# Patient Record
Sex: FEMALE | Race: WHITE | Employment: OTHER | RURAL
[De-identification: names, ages, dates, MRNs, and addresses within clinical notes are randomized per-mention and may not be internally consistent; named-entity substitution may affect disease eponyms.]

---

## 2017-01-10 ENCOUNTER — TELEPHONE (OUTPATIENT)
Dept: INTERNAL MEDICINE CLINIC | Age: 62
End: 2017-01-10

## 2017-01-10 NOTE — TELEPHONE ENCOUNTER
Chief Complaint   Patient presents with    Appointment     PSYCHIATRY     Left message to voicemail to return call to schedule appointment. Message states that they will return call within 48 business hours.

## 2017-01-12 ENCOUNTER — TELEPHONE (OUTPATIENT)
Dept: INTERNAL MEDICINE CLINIC | Age: 62
End: 2017-01-12

## 2017-05-08 DIAGNOSIS — E78.00 HIGH CHOLESTEROL: ICD-10-CM

## 2017-05-08 DIAGNOSIS — E07.9 THYROID DISEASE: ICD-10-CM

## 2017-05-08 RX ORDER — LEVOTHYROXINE SODIUM 137 UG/1
137 TABLET ORAL
Qty: 30 TAB | Refills: 0 | Status: CANCELLED | OUTPATIENT
Start: 2017-05-08

## 2017-05-08 RX ORDER — ATORVASTATIN CALCIUM 10 MG/1
10 TABLET, FILM COATED ORAL DAILY
Qty: 90 TAB | Refills: 1 | Status: SHIPPED | OUTPATIENT
Start: 2017-05-08 | End: 2017-07-19 | Stop reason: SDUPTHER

## 2017-05-08 RX ORDER — INSULIN GLARGINE 100 [IU]/ML
INJECTION, SOLUTION SUBCUTANEOUS
Qty: 1 VIAL | Refills: 5 | Status: SHIPPED | OUTPATIENT
Start: 2017-05-08 | End: 2017-10-03 | Stop reason: SDUPTHER

## 2017-05-08 NOTE — TELEPHONE ENCOUNTER
Last office visit:  11/4/16  Last med refilled:  Lantus Ins:  11/4/16  1 vial X no refills                                Reg ins:  11/4/16  10ml X 4 refills  No changes noted  Future appt:  5/11/17  Willard Koyanagi

## 2017-05-09 ENCOUNTER — TELEPHONE (OUTPATIENT)
Dept: INTERNAL MEDICINE CLINIC | Age: 62
End: 2017-05-09

## 2017-05-09 DIAGNOSIS — E07.9 THYROID DISEASE: ICD-10-CM

## 2017-05-09 NOTE — TELEPHONE ENCOUNTER
Patient called in reference to all of her prescriptions being called to [address] except her syringes. Please call her at [phone].

## 2017-05-09 NOTE — TELEPHONE ENCOUNTER
Chief Complaint   Patient presents with    Medication Refill     SYNTHROID AND SYRINGES     Requested Prescriptions     Pending Prescriptions Disp Refills    levothyroxine (SYNTHROID) 137 mcg tablet       Sig: Take 137 mcg by mouth Daily (before breakfast).  Insulin Syringes, Disposable, 1 mL syrg 90 Syringe 1     Sig: by Does Not Apply route daily. Patient has been informed that an appointment is required for the refill of her thyroid medication.   Patient is scheduled to be seen on Thursday, May 11, 2017 at 11 am.

## 2017-05-12 ENCOUNTER — OFFICE VISIT (OUTPATIENT)
Dept: INTERNAL MEDICINE CLINIC | Age: 62
End: 2017-05-12

## 2017-05-12 VITALS
WEIGHT: 125 LBS | DIASTOLIC BLOOD PRESSURE: 80 MMHG | TEMPERATURE: 97.3 F | SYSTOLIC BLOOD PRESSURE: 139 MMHG | OXYGEN SATURATION: 98 % | HEART RATE: 72 BPM | HEIGHT: 69 IN | BODY MASS INDEX: 18.51 KG/M2 | RESPIRATION RATE: 18 BRPM

## 2017-05-12 DIAGNOSIS — E03.9 ACQUIRED HYPOTHYROIDISM: ICD-10-CM

## 2017-05-12 DIAGNOSIS — Z12.39 SCREENING FOR BREAST CANCER: ICD-10-CM

## 2017-05-12 DIAGNOSIS — R22.0 SCALP MASS: Primary | ICD-10-CM

## 2017-05-12 DIAGNOSIS — F32.A DEPRESSION, UNSPECIFIED DEPRESSION TYPE: ICD-10-CM

## 2017-05-12 RX ORDER — PREGABALIN 100 MG/1
100 CAPSULE ORAL 2 TIMES DAILY
Qty: 60 CAP | Refills: 5 | Status: SHIPPED | OUTPATIENT
Start: 2017-05-12 | End: 2017-11-21 | Stop reason: SDUPTHER

## 2017-05-12 RX ORDER — LEVOTHYROXINE SODIUM 125 UG/1
TABLET ORAL
Refills: 0 | COMMUNITY
Start: 2017-02-26 | End: 2017-07-19 | Stop reason: SDUPTHER

## 2017-05-12 RX ORDER — INSULIN HUMAN 100 [IU]/ML
INJECTION, SOLUTION PARENTERAL
Refills: 0 | COMMUNITY
Start: 2017-04-23 | End: 2017-05-12 | Stop reason: ALTCHOICE

## 2017-05-12 RX ORDER — ASPIRIN 81 MG/1
81 TABLET ORAL DAILY
COMMUNITY
Start: 2017-05-12

## 2017-05-12 RX ORDER — SERTRALINE HYDROCHLORIDE 100 MG/1
50 TABLET, FILM COATED ORAL DAILY
COMMUNITY
Start: 2017-05-12 | End: 2017-08-14 | Stop reason: SDUPTHER

## 2017-05-12 NOTE — MR AVS SNAPSHOT
Visit Information Date & Time Provider Department Dept. Phone Encounter #  
 5/12/2017 11:00 AM Yong Mejia NP [facility] [phone] [id number] Follow-up Instructions Return in about 3 months (around 8/12/2017) for Well woman exam. Upcoming Health Maintenance Date Due Hepatitis C Screening 1955 HEMOGLOBIN A1C Q6M 1955 LIPID PANEL Q1 1955 FOOT EXAM Q1 11/25/1963 MICROALBUMIN Q1 11/25/1963 Pneumococcal 19-64 Medium Risk (1 of 1 - PPSV23) 11/25/1972 DTaP/Tdap/Td series (1 - Tdap) 11/25/1974 PAP AKA CERVICAL CYTOLOGY 11/25/1974 BREAST CANCER SCRN MAMMOGRAM 11/25/2003 FOBT Q 1 YEAR AGE 50-75 11/25/2003 ZOSTER VACCINE AGE 60> 11/25/2013 INFLUENZA AGE 9 TO ADULT 8/1/2017 EYE EXAM RETINAL OR DILATED Q1 11/18/2017 Allergies as of 5/12/2017  Review Complete On: 5/12/2017 By: Yong Mejia NP No Known Allergies Current Immunizations  Never Reviewed No immunizations on file. Not reviewed this visit You Were Diagnosed With   
  
 Codes Comments Acquired hypothyroidism    -  Primary ICD-10-CM: E03.9 ICD-9-CM: 244.9 Uncontrolled type 2 diabetes mellitus with diabetic nephropathy, with long-term current use of insulin (HCC)     ICD-10-CM: E11.21, E11.65, Z79.4 ICD-9-CM: 250.42, 583.81, V58.67 Depression, unspecified depression type     ICD-10-CM: F32.9 ICD-9-CM: 685 Screening for breast cancer     ICD-10-CM: Z12.39 
ICD-9-CM: V76.10 Scalp mass     ICD-10-CM: R22.0 ICD-9-CM: 158. 2 Vitals BP Pulse Temp Resp Height(growth percentile) Weight(growth percentile) 139/80 (BP 1 Location: Right arm, BP Patient Position: Sitting) 72 97.3 °F (36.3 °C) (Oral) 18 5' 9\" (1.753 m) 125 lb (56.7 kg) SpO2 BMI OB Status Smoking Status 98% 18.46 kg/m2 Postmenopausal Current Every Day Smoker Vitals History BMI and BSA Data Body Mass Index Body Surface Area 18.46 kg/m 2 1.66 m 2 Preferred Pharmacy Pharmacy Name Phone [facility], [address] [facility] [phone] Your Updated Medication List  
  
   
This list is accurate as of: 5/12/17 11:53 AM.  Always use your most recent med list.  
  
  
  
  
 aspirin delayed-release 81 mg tablet Take 1 Tab by mouth daily. atorvastatin 10 mg tablet Commonly known as:  LIPITOR Take 1 Tab by mouth daily. busPIRone 7.5 mg tablet Commonly known as:  BUSPAR Take 7.5 mg by mouth two (2) times a day. insulin glargine 100 unit/mL injection Commonly known as:  LANTUS Take 30 units daily  
  
 insulin NPH/insulin regular 100 unit/mL (70-30) injection Commonly known as:  NOVOLIN 70/30, HUMULIN 70/30 Take 20 uints in the AM and 20 uints in the PM. Insulin Syringes (Disposable) 1 mL Syrg 1 Each by Does Not Apply route three (3) times daily. lisinopril 20 mg tablet Commonly known as:  Dominick Lofty Take 1 Tab by mouth daily. One Touch Delica 33 gauge Misc Generic drug:  lancets  
use as directed by prescriber four times a day with meals and at bedtime ONETOUCH ULTRA TEST strip Generic drug:  glucose blood VI test strips  
use as directed by prescriber four times a day with meals and at bedtime  
  
 pregabalin 100 mg capsule Commonly known as:  Ephraim Handsome Take 1 Cap by mouth two (2) times a day. Max Daily Amount: 200 mg.  
  
 sertraline 100 mg tablet Commonly known as:  ZOLOFT Take 0.5 Tabs by mouth daily. * SYNTHROID 137 mcg tablet Generic drug:  levothyroxine Take 137 mcg by mouth Daily (before breakfast). * levothyroxine 125 mcg tablet Commonly known as:  SYNTHROID * Notice: This list has 2 medication(s) that are the same as other medications prescribed for you. Read the directions carefully, and ask your doctor or other care provider to review them with you. Prescriptions Printed Refills  
 pregabalin (LYRICA) 100 mg capsule 5 Sig: Take 1 Cap by mouth two (2) times a day. Max Daily Amount: 200 mg. Class: Print Route: Oral  
  
Prescriptions Sent to Pharmacy Refills Insulin Syringes, Disposable, 1 mL syrg 5 Si Each by Does Not Apply route three (3) times daily. Class: Normal  
 Pharmacy: [facility] [address] [place] [address] #: [phone] Route: Does Not Apply We Performed the Following MICROALBUMIN, UR, RAND W/ MICROALBUMIN/CREA RATIO [id number] CPT(R)] Follow-up Instructions Return in about 3 months (around 2017) for Well woman exam. To-Do List   
 2017 Lab:  HEMOGLOBIN A1C WITH EAG   
  
 2017 Lab:  LIPID PANEL   
  
 2017 Imaging:  [facility] MAMMO BI SCREENING INCL CAD   
  
 2017 Lab:  METABOLIC PANEL, COMPREHENSIVE   
  
 2017 Lab:  TSH 3RD GENERATION   
  
 2017 Imaging:  XR SKULL MIN 4 V(COMP) Introducing [facility] & HEALTH SERVICES! Liz Fredy introduces [employer] patient portal. Now you can access parts of your medical record, email your doctor's office, and request medication refills online. 1. In your internet browser, go to [email or web address] 2. Click on the First Time User? Click Here link in the Sign In box. You will see the New Member Sign Up page. 3. Enter your O[employer]od Access Code exactly as it appears below. You will not need to use this code after youve completed the sign-up process. If you do not sign up before the expiration date, you must request a new code. · Ogorod Access Code: [email or web address] Expires: 8/10/2017 10:49 AM 
 
4. Enter the last four digits of your Social Security Number (xxxx) and Date of Birth (mm/dd/yyyy) as indicated and click Submit. You will be taken to the next sign-up page. 5. Create a Tu Otro Super ID. This will be your [employer] login ID and cannot be changed, so think of one that is secure and easy to remember. 6. Create a [employer] Super password. You can change your password at any time. 7. Enter your Password Reset Question and Answer. This can be used at a later time if you forget your password. 8. Enter your e-mail address. You will receive e-mail notification when new information is available in [address] 9. Click Sign Up. You can now view and download portions of your medical record. 10. Click the Download Summary menu link to download a portable copy of your medical information. If you have questions, please visit the Frequently Asked Questions section of the [employer] website. Remember, [employer] is NOT to be used for urgent needs. For medical emergencies, dial 911. Now available from your iPhone and Android! Please provide this summary of care documentation to your next provider. Your primary care clinician is listed as Mason Gill. If you have any questions after today's visit, please call [phone].

## 2017-05-12 NOTE — PROGRESS NOTES
HISTORY OF PRESENT ILLNESS  Naheed Salas is a 59 y.o. female. HPI  Chief Complaint   Patient presents with    Diabetes     MEDICATION REFILL    Foot Swelling     BILATERAL SINCE LAST WEEK    Mass     TOP OF HEAD X 3 MONTHS     States foot swelling is better. States it goes down at night. Denies cough or difficulty in breathing. Request refill Dm insulin needles. States 's  Patient in agreement to get labs. 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 Denies pain. Denies trauma. Review of Systems   Constitutional: Negative. Negative for chills and fever. HENT: Negative. Negative for congestion, ear pain and sore throat. Eyes: Negative. Respiratory: Negative. Negative for cough. Cardiovascular: Negative. Negative for chest pain and claudication. Gastrointestinal: Negative. Negative for abdominal pain, constipation, diarrhea, nausea and vomiting. Genitourinary: Negative. Skin: Negative. Scalp mass   Neurological: Negative for headaches. Physical Exam   Constitutional: She is oriented to person, place, and time. She appears well-developed and well-nourished. No distress. HENT:   Head: Normocephalic and atraumatic. Eyes: Conjunctivae are normal.   Neck: Normal range of motion. Cardiovascular: Normal rate, regular rhythm, normal heart sounds and intact distal pulses. Exam reveals no gallop and no friction rub. No murmur heard. Pulmonary/Chest: Effort normal and breath sounds normal.   Musculoskeletal: Normal range of motion. She exhibits deformity. She exhibits no tenderness. Positive for ridges on scalp. No pain with palpation     Neurological: She is alert and oriented to person, place, and time. Skin: Skin is warm and dry. She is not diaphoretic. Nursing note and vitals reviewed. Plan of care and AVS reviewed with patient who verbalized understanding.     ASSESSMENT and PLAN    ICD-10-CM ICD-9-CM    1. Scalp mass R22.0 782. 2 XR SKULL MIN 4 V(COMP)   2. Uncontrolled type 2 diabetes mellitus with diabetic nephropathy, with long-term current use of insulin (HCC) E11.21 250.42 pregabalin (LYRICA) 100 mg capsule    A82.50 462.30 METABOLIC PANEL, COMPREHENSIVE    Z79.4 V58.67 HEMOGLOBIN A1C WITH EAG      LIPID PANEL      aspirin delayed-release 81 mg tablet      Insulin Syringes, Disposable, 1 mL syrg      MICROALBUMIN, UR, RAND W/ MICROALBUMIN/CREA RATIO   3. Depression, unspecified depression type F32.9 311 sertraline (ZOLOFT) 100 mg tablet   4. Acquired hypothyroidism E03.9 244.9 TSH 3RD GENERATION   5. Screening for breast cancer Z12.39 V76.10 [facility] MAMMO BI SCREENING INCL CAD   Patient to get labs and F/U well woman exam in august.  Will notify patient of results of x ray.

## 2017-05-12 NOTE — PROGRESS NOTES
Chief Complaint   Patient presents with    Diabetes     MEDICATION REFILL    Foot Swelling     BILATERAL SINCE LAST WEEK    Mass     TOP OF HEAD X 3 MONTHS     1. Have you been to the ER, urgent care clinic since your last visit? Hospitalized since your last visit? No    2. Have you seen or consulted any other health care providers outside of the [address] [place] [address] since your last visit? Include any pap smears or colon screening.  No     Health Maintenance Due   Topic Date Due    Hepatitis C Screening  1955    HEMOGLOBIN A1C Q6M  1953    LIPID PANEL Q1  1953    FOOT EXAM Q1  11/25/1963    MICROALBUMIN Q1  11/25/1963    Pneumococcal 19-64 Medium Risk (1 of 1 - PPSV23) 11/25/1972    DTaP/Tdap/Td series (1 - Tdap) 11/25/1974    PAP AKA CERVICAL CYTOLOGY  11/25/1974    BREAST CANCER SCRN MAMMOGRAM  11/25/2003    FOBT Q 1 YEAR AGE 50-75  11/25/2003    ZOSTER VACCINE AGE 60>  11/25/2013

## 2017-07-10 ENCOUNTER — CLINICAL SUPPORT (OUTPATIENT)
Dept: INTERNAL MEDICINE CLINIC | Age: 62
End: 2017-07-10

## 2017-07-10 DIAGNOSIS — F32.A DEPRESSION, UNSPECIFIED DEPRESSION TYPE: ICD-10-CM

## 2017-07-10 DIAGNOSIS — E07.9 THYROID DISEASE: ICD-10-CM

## 2017-07-10 NOTE — PROGRESS NOTES
Chief Complaint   Patient presents with    Labs Only     TSH, LIPID, HGA1C, AND CMP     The patient presents with lab draw only. Blood drawn from right antecubital fossa and no negative reaction noted at site of blood draw.

## 2017-07-11 LAB
ALBUMIN 24H UR-MRATE: NORMAL MG/DAY
ALBUMIN SERPL-MCNC: 5 G/DL (ref 3.6–4.8)
ALBUMIN/GLOB SERPL: 2 {RATIO} (ref 1.2–2.2)
ALP SERPL-CCNC: 92 IU/L (ref 39–117)
ALT SERPL-CCNC: 42 IU/L (ref 0–32)
AST SERPL-CCNC: 70 IU/L (ref 0–40)
BILIRUB SERPL-MCNC: 0.3 MG/DL (ref 0–1.2)
BUN SERPL-MCNC: 11 MG/DL (ref 8–27)
BUN/CREAT SERPL: 10 (ref 12–28)
CALCIUM SERPL-MCNC: 9.7 MG/DL (ref 8.7–10.3)
CHLORIDE SERPL-SCNC: 89 MMOL/L (ref 96–106)
CHOLEST SERPL-MCNC: 292 MG/DL (ref 100–199)
CO2 SERPL-SCNC: 29 MMOL/L (ref 18–29)
CREAT SERPL-MCNC: 1.15 MG/DL (ref 0.57–1)
EST. AVERAGE GLUCOSE BLD GHB EST-MCNC: 278 MG/DL
GLOBULIN SER CALC-MCNC: 2.5 G/DL (ref 1.5–4.5)
GLUCOSE SERPL-MCNC: 41 MG/DL (ref 65–99)
HBA1C MFR BLD: 11.3 % (ref 4.8–5.6)
HDLC SERPL-MCNC: 115 MG/DL
INTERPRETATION, 910389: NORMAL
INTERPRETATION: NORMAL
LDLC SERPL CALC-MCNC: 150 MG/DL (ref 0–99)
Lab: NORMAL
MICROALBUMIN UR-MCNC: 47.6 UG/ML
PDF IMAGE, 910387: NORMAL
POTASSIUM SERPL-SCNC: 4.4 MMOL/L (ref 3.5–5.2)
PROT SERPL-MCNC: 7.5 G/DL (ref 6–8.5)
SODIUM SERPL-SCNC: 137 MMOL/L (ref 134–144)
TRIGL SERPL-MCNC: 134 MG/DL (ref 0–149)
TSH SERPL DL<=0.005 MIU/L-ACNC: 118.9 UIU/ML (ref 0.45–4.5)
VLDLC SERPL CALC-MCNC: 27 MG/DL (ref 5–40)

## 2017-07-17 ENCOUNTER — TELEPHONE (OUTPATIENT)
Dept: INTERNAL MEDICINE CLINIC | Age: 62
End: 2017-07-17

## 2017-07-17 NOTE — TELEPHONE ENCOUNTER
Chief Complaint   Patient presents with    Appointment     LABS     Appointment scheduled per Sarahy Zamora DNP to discuss her labs.

## 2017-07-19 ENCOUNTER — OFFICE VISIT (OUTPATIENT)
Dept: INTERNAL MEDICINE CLINIC | Age: 62
End: 2017-07-19

## 2017-07-19 VITALS
HEART RATE: 69 BPM | SYSTOLIC BLOOD PRESSURE: 132 MMHG | DIASTOLIC BLOOD PRESSURE: 76 MMHG | RESPIRATION RATE: 18 BRPM | WEIGHT: 128.6 LBS | TEMPERATURE: 98.8 F | HEIGHT: 69 IN | OXYGEN SATURATION: 91 % | BODY MASS INDEX: 19.05 KG/M2

## 2017-07-19 DIAGNOSIS — E78.00 HIGH CHOLESTEROL: ICD-10-CM

## 2017-07-19 DIAGNOSIS — E07.9 THYROID DISEASE: ICD-10-CM

## 2017-07-19 LAB
BILIRUB UR QL STRIP: NEGATIVE
GLUCOSE UR-MCNC: NEGATIVE MG/DL
KETONES P FAST UR STRIP-MCNC: NEGATIVE MG/DL
PH UR STRIP: 7 [PH] (ref 4.6–8)
PROT UR QL STRIP: POSITIVE MG/DL
SP GR UR STRIP: 1.02 (ref 1–1.03)
UA UROBILINOGEN AMB POC: NORMAL (ref 0.2–1)
URINALYSIS CLARITY POC: CLEAR
URINALYSIS COLOR POC: YELLOW
URINE BLOOD POC: NEGATIVE
URINE LEUKOCYTES POC: NORMAL
URINE NITRITES POC: NEGATIVE

## 2017-07-19 RX ORDER — PEN NEEDLE, DIABETIC 29 G X1/2"
NEEDLE, DISPOSABLE MISCELLANEOUS
COMMUNITY
Start: 2017-05-12 | End: 2019-01-08 | Stop reason: SDUPTHER

## 2017-07-19 RX ORDER — BUPRENORPHINE HYDROCHLORIDE, NALOXONE HYDROCHLORIDE 8; 2 MG/1; MG/1
FILM, SOLUBLE BUCCAL; SUBLINGUAL
Refills: 0 | COMMUNITY
Start: 2017-07-12 | End: 2018-04-27 | Stop reason: ALTCHOICE

## 2017-07-19 RX ORDER — ATORVASTATIN CALCIUM 20 MG/1
20 TABLET, FILM COATED ORAL DAILY
Qty: 90 TAB | Refills: 1 | Status: SHIPPED | OUTPATIENT
Start: 2017-07-19 | End: 2018-06-12 | Stop reason: SDUPTHER

## 2017-07-19 RX ORDER — LEVOTHYROXINE SODIUM 125 UG/1
125 TABLET ORAL DAILY
Qty: 30 TAB | Refills: 5 | Status: SHIPPED | OUTPATIENT
Start: 2017-07-19 | End: 2017-09-15 | Stop reason: SDUPTHER

## 2017-07-19 RX ORDER — INSULIN HUMAN 100 [IU]/ML
INJECTION, SOLUTION PARENTERAL
Refills: 0 | COMMUNITY
Start: 2017-04-23 | End: 2017-07-25 | Stop reason: ALTCHOICE

## 2017-07-19 RX ORDER — FUROSEMIDE 20 MG/1
20 TABLET ORAL DAILY
Qty: 10 TAB | Refills: 0 | Status: SHIPPED | OUTPATIENT
Start: 2017-07-19 | End: 2017-07-29

## 2017-07-19 NOTE — PROGRESS NOTES
Chief Complaint   Patient presents with    Ankle swelling    Labs     FOLLOW UP     1. Have you been to the ER, urgent care clinic since your last visit? Hospitalized since your last visit? No    2. Have you seen or consulted any other health care providers outside of the [address] [place] [address] since your last visit? Include any pap smears or colon screening. No     There are no preventive care reminders to display for this patient.

## 2017-07-19 NOTE — MR AVS SNAPSHOT
Visit Information Date & Time Provider Department Dept. Phone Encounter #  
 7/19/2017 11:15 AM Karen Shen NP [facility] [phone] Your Appointments 8/14/2017 11:00 AM  
ROUTINE CARE with HUY Brito Lien (LULU SCHEDULING) Appt Note: 3 mo f/u on dm $0pb $0cp [facility]5/12/17  
 [address] [place] [address] Road. [address] [facility] [address]  
[phone]  
  
   
 [address] [place] [address] [address] Upcoming Health Maintenance Date Due Hepatitis C Screening 8/30/2017* FOOT EXAM Q1 8/30/2017* BREAST CANCER SCRN MAMMOGRAM 8/30/2017* PAP AKA CERVICAL CYTOLOGY 8/30/2017* FOBT Q 1 YEAR AGE 50-75 8/30/2017* INFLUENZA AGE 9 TO ADULT 8/1/2017 EYE EXAM RETINAL OR DILATED Q1 11/18/2017 HEMOGLOBIN A1C Q6M 1/10/2018 MICROALBUMIN Q1 7/10/2018 LIPID PANEL Q1 7/10/2018 DTaP/Tdap/Td series (2 - Td) 5/12/2027 *Topic was postponed. The date shown is not the original due date. Allergies as of 7/19/2017  Review Complete On: 7/19/2017 By: sAif Butler LPN No Known Allergies Current Immunizations  Never Reviewed Name Date Influenza Vaccine 10/28/2016 12:00 AM, 11/12/2012 12:00 AM, 10/18/2012 12:00 AM, 11/17/2009 12:00 AM  
 Pneumococcal Polysaccharide (PPSV-23) 10/18/2012 12:00 AM  
 Zoster Vaccine, Live 11/12/2012 12:00 AM  
  
 Not reviewed this visit You Were Diagnosed With   
  
 Codes Comments Uncontrolled type 2 diabetes mellitus with diabetic nephropathy, with long-term current use of insulin ([facility] [address]    -  Primary ICD-10-CM: E11.21, E11.65, Z79.4 ICD-9-CM: 250.42, 583.81, V58.67 High cholesterol     ICD-10-CM: E78.00 ICD-9-CM: 272.0 Thyroid disease     ICD-10-CM: E07.9 ICD-9-CM: 246. 9 Vitals BP Pulse Temp Resp Height(growth percentile) Weight(growth percentile) 132/76 (BP 1 Location: Left arm, BP Patient Position: Sitting) 69 98.8 °F (37.1 °C) (Oral) 18 5' 9\" (1.753 m) 128 lb 9.6 oz (58.3 kg) SpO2 BMI OB Status Smoking Status 91% 18.99 kg/m2 Postmenopausal Current Every Day Smoker Vitals History BMI and BSA Data Body Mass Index Body Surface Area  
 18.99 kg/m 2 1.68 m 2 Preferred Pharmacy Pharmacy Name Phone [facility] [phone] Your Updated Medication List  
  
   
This list is accurate as of: 7/19/17 11:18 AM.  Always use your most recent med list.  
  
  
  
  
 aspirin delayed-release 81 mg tablet Take 1 Tab by mouth daily. atorvastatin 20 mg tablet Commonly known as:  LIPITOR Take 1 Tab by mouth daily. BD INSULIN SYRINGE ULTRA-FINE 1 mL 31 gauge x 5/16 Syrg Generic drug:  Insulin Syringe-Needle U-100  
  
 busPIRone 7.5 mg tablet Commonly known as:  BUSPAR Take 7.5 mg by mouth two (2) times a day. furosemide 20 mg tablet Commonly known as:  LASIX Take 1 Tab by mouth daily for 10 days. HumuLIN R U-100 100 unit/mL injection Generic drug:  insulin regular  
  
 insulin glargine 100 unit/mL injection Commonly known as:  LANTUS Take 30 units daily  
  
 insulin NPH/insulin regular 100 unit/mL (70-30) injection Commonly known as:  NOVOLIN 70/30, HUMULIN 70/30 Take 20 uints in the AM and 20 uints in the PM. levothyroxine 125 mcg tablet Commonly known as:  SYNTHROID Take 1 Tab by mouth daily. lisinopril 20 mg tablet Commonly known as:  Lynne Bills Take 1 Tab by mouth daily. One Touch Delica 33 gauge Misc Generic drug:  lancets  
use as directed by prescriber four times a day with meals and at bedtime ONETOUCH ULTRA TEST strip Generic drug:  glucose blood VI test strips  
use as directed by prescriber four times a day with meals and at bedtime pregabalin 100 mg capsule Commonly known as:  Ash Citizen Take 1 Cap by mouth two (2) times a day. Max Daily Amount: 200 mg.  
  
 sertraline 100 mg tablet Commonly known as:  ZOLOFT Take 0.5 Tabs by mouth daily. SUBOXONE 8-2 mg Film sublingaul film Generic drug:  buprenorphine-naloxone  
dissolve 2 and 1/2 (TWO AND A HALF) FILMS under the tongue daily Prescriptions Sent to Pharmacy Refills  
 levothyroxine (SYNTHROID) 125 mcg tablet 5 Sig: Take 1 Tab by mouth daily. Class: Normal  
 Pharmacy: [facility] [address] [facility] Ph #: [phone] Route: Oral  
 atorvastatin (LIPITOR) 20 mg tablet 1 Sig: Take 1 Tab by mouth daily. Class: Normal  
 Pharmacy: [facility] [address] [facility] Ph #: [phone] Route: Oral  
 furosemide (LASIX) 20 mg tablet 0 Sig: Take 1 Tab by mouth daily for 10 days. Class: Normal  
 Pharmacy: [facility] [address] [facility] Ph #: [phone] Route: Oral  
  
We Performed the Following AMB POC URINALYSIS DIP STICK MANUAL W/O MICRO [05121 CPT(R)] MICROALBUMIN, UR, RAND W/ MICROALBUMIN/CREA RATIO [id number] CPT(R)] To-Do List   
 07/19/2017 Lab:  METABOLIC PANEL, COMPREHENSIVE   
  
 07/19/2017 Lab:  TSH 3RD GENERATION Introducing [facility] & HEALTH SERVICES! [facility] introduces [employer]ib patient portal. Now you can access parts of your medical record, email your doctor's office, and request medication refills online. 1. In your internet browser, go to [email or web address] 2. Click on the First Time User? Click Here link in the Sign In box. You will see the New Member Sign Up page. 3. Enter your Doctolib Access Code exactly as it appears below. You will not need to use this code after youve completed the sign-up process.  If you do not sign up before the expiration date, you must request a new code. · Ploonge Access Code: [email or web address] Expires: 8/10/2017 10:49 AM 
 
4. Enter the last four digits of your Social Security Number (xxxx) and Date of Birth (mm/dd/yyyy) as indicated and click Submit. You will be taken to the next sign-up page. 5. Create a Ploonge ID. This will be your Ploonge login ID and cannot be changed, so think of one that is secure and easy to remember. 6. Create a Ploonge password. You can change your password at any time. 7. Enter your Password Reset Question and Answer. This can be used at a later time if you forget your password. 8. Enter your e-mail address. You will receive e-mail notification when new information is available in [address] 9. Click Sign Up. You can now view and download portions of your medical record. 10. Click the Download Summary menu link to download a portable copy of your medical information. If you have questions, please visit the Frequently Asked Questions section of the Ploonge website. Remember, Ploonge is NOT to be used for urgent needs. For medical emergencies, dial 911. Now available from your iPhone and Android! Please provide this summary of care documentation to your next provider. Your primary care clinician is listed as Karen Shen. If you have any questions after today's visit, please call [phone].

## 2017-07-20 LAB
ALBUMIN/CREAT UR: 186.3 MG/G CREAT (ref 0–30)
CREAT UR-MCNC: 54.9 MG/DL
MICROALBUMIN UR-MCNC: 102.3 UG/ML

## 2017-07-25 NOTE — PROGRESS NOTES
HISTORY OF PRESENT ILLNESS  Clemetn Tipton is a 59 y.o. female. HPI  Chief Complaint   Patient presents with    Ankle swelling    Labs     FOLLOW UP     Displays most recent values of common labs: H&H, WBC, Platelets, ALT, AST, BUN, Creat, Na, K, TSH, HgbA1c, Lipids, INR and/or PSA. For additional labs, please use Results Review or Flowsheets. Lab Results   Component Value Date/Time    ALT (SGPT) 42 07/10/2017 09:07 AM    AST (SGOT) 70 07/10/2017 09:07 AM    Creatinine 1.15 07/10/2017 09:07 AM    BUN 11 07/10/2017 09:07 AM    Sodium 137 07/10/2017 09:07 AM    Potassium 4.4 07/10/2017 09:07 AM       Lab Results   Component Value Date/Time    Cholesterol, total 292 07/10/2017 09:07 AM    HDL Cholesterol 115 07/10/2017 09:07 AM    LDL, calculated 150 07/10/2017 09:07 AM    Triglyceride 134 07/10/2017 09:07 AM    .900 07/10/2017 09:07 AM    Hemoglobin A1c 11.3 07/10/2017 09:07 AM     Diabetes is not being controlled. Patient in agreement to follow-up labs. Patient admits to not taking meications related to being busy with taking care of family. Medication list revised. Review of Systems   Constitutional: Negative. Negative for chills and fever. HENT: Negative. Negative for congestion, ear pain and sore throat. Eyes: Negative. Respiratory: Negative for cough and shortness of breath. Cardiovascular: Positive for leg swelling. Negative for chest pain. Gastrointestinal: Negative. Negative for nausea and vomiting. Genitourinary: Negative. Negative for dysuria and flank pain. Musculoskeletal: Negative. Negative for myalgias. Skin: Negative. Neurological: Negative for headaches. Physical Exam   Constitutional: She is oriented to person, place, and time. She appears well-developed and well-nourished. No distress. HENT:   Head: Normocephalic and atraumatic. Eyes: Conjunctivae are normal.   Neck: Normal range of motion. Neck supple.    Cardiovascular: Normal rate, regular rhythm, normal heart sounds and intact distal pulses. Exam reveals no gallop and no friction rub. No murmur heard. Pulmonary/Chest: Effort normal and breath sounds normal.   Abdominal: Soft. Bowel sounds are normal.   Musculoskeletal: Normal range of motion. Neurological: She is alert and oriented to person, place, and time. Skin: Skin is warm and dry. She is not diaphoretic. Nursing note and vitals reviewed. Plan of care and AVS reviewed with patient who verbalized understanding. ASSESSMENT and PLAN    ICD-10-CM ICD-9-CM    1. Uncontrolled type 2 diabetes mellitus with diabetic nephropathy, with long-term current use of insulin (HCC) E11.21 250.42 AMB POC URINALYSIS DIP STICK MANUAL W/O MICRO    E11.65 583.81 MICROALBUMIN, UR, RAND W/ MICROALBUMIN/CREA RATIO    O64.2 N76.93 METABOLIC PANEL, COMPREHENSIVE   2. High cholesterol E78.00 272.0 atorvastatin (LIPITOR) 20 mg tablet   3. Thyroid disease E07.9 246.9 TSH 3RD GENERATION   Will notify of new lab work results. Encouraged patient to take medications as prescribed. F/U 3 months.

## 2017-08-03 ENCOUNTER — CLINICAL SUPPORT (OUTPATIENT)
Dept: INTERNAL MEDICINE CLINIC | Age: 62
End: 2017-08-03

## 2017-08-03 ENCOUNTER — OFFICE VISIT (OUTPATIENT)
Dept: INTERNAL MEDICINE CLINIC | Age: 62
End: 2017-08-03

## 2017-08-03 VITALS
HEART RATE: 98 BPM | OXYGEN SATURATION: 81 % | TEMPERATURE: 99.8 F | SYSTOLIC BLOOD PRESSURE: 139 MMHG | WEIGHT: 121.2 LBS | RESPIRATION RATE: 18 BRPM | HEIGHT: 69 IN | DIASTOLIC BLOOD PRESSURE: 67 MMHG | BODY MASS INDEX: 17.95 KG/M2

## 2017-08-03 VITALS
WEIGHT: 121.2 LBS | BODY MASS INDEX: 17.95 KG/M2 | HEART RATE: 98 BPM | SYSTOLIC BLOOD PRESSURE: 139 MMHG | HEIGHT: 69 IN | TEMPERATURE: 99.8 F | RESPIRATION RATE: 18 BRPM | DIASTOLIC BLOOD PRESSURE: 67 MMHG

## 2017-08-03 DIAGNOSIS — E07.9 THYROID DISEASE: ICD-10-CM

## 2017-08-03 DIAGNOSIS — R06.89 DECREASED BREATH SOUNDS: ICD-10-CM

## 2017-08-03 DIAGNOSIS — R79.81 LOW O2 SATURATION: Primary | ICD-10-CM

## 2017-08-03 DIAGNOSIS — R53.83 LETHARGY: ICD-10-CM

## 2017-08-03 DIAGNOSIS — R50.9 FEVER, UNSPECIFIED FEVER CAUSE: ICD-10-CM

## 2017-08-03 LAB — GLUCOSE POC: 78 MG/DL

## 2017-08-03 RX ORDER — ALBUTEROL SULFATE 0.83 MG/ML
2.5 SOLUTION RESPIRATORY (INHALATION) ONCE
Qty: 1 EACH | Refills: 0
Start: 2017-08-03 | End: 2017-08-03

## 2017-08-03 NOTE — PROGRESS NOTES
HISTORY OF PRESENT ILLNESS  Jackelyn Joshua is a 59 y.o. female. HPI  Patient in with daughter. Chief Complaint   Patient presents with    Fever    O2/Oxygen     low saturation     Came in for labs and VS indicated low O2 Saturation and lethargy and patient stated did not feel well. Patient states has been taking prescribed medications off and own. States there is no reason why she cannot take her medications as prescribed. Patient is talkative and makes fleeting eye contact with keeping her eyes closed most of the time. States is tired, and don't feel well. Past Medical History:   Diagnosis Date    Depression     x 1 year    Diabetes ([facility]     > 15 years    DKA (diabetic ketoacidoses) ([facility]     10/29/ hospitalization    Generalized pain     High cholesterol     Thyroid disease     Thyroid removed. Social History     Social History    Marital status:      Spouse name: N/A    Number of children: N/A    Years of education: N/A     Occupational History    Not on file. Social History Main Topics    Smoking status: Current Every Day Smoker     Packs/day: 2.00    Smokeless tobacco: Never Used    Alcohol use No    Drug use: No    Sexual activity: No     Other Topics Concern     Service No    Blood Transfusions Yes    Caffeine Concern Yes     Not sure drinks a lot foof suragr    Sleep Concern Yes    Stress Concern Yes    Exercise No    Seat Belt Yes    Self-Exams Yes     Social History Narrative    Smokes and has quit1 yrar ago. Unemployed. ,  3 children        Review of Systems   Constitutional: Positive for chills, fever and malaise/fatigue. States just don't  Feel well. HENT: Negative. Negative for congestion and sore throat. Eyes: Negative. Respiratory: Positive for cough. Negative for shortness of breath and wheezing. Cardiovascular: Negative. Negative for chest pain and leg swelling. Gastrointestinal: Negative.   Negative for abdominal pain, diarrhea, nausea and vomiting. Genitourinary: Negative. Musculoskeletal: Negative. Skin: Negative. Neurological: Negative. Negative for dizziness. Physical Exam   Constitutional: She is oriented to person, place, and time. She appears well-developed and well-nourished. No distress. HENT:   Head: Normocephalic and atraumatic. Eyes: Conjunctivae are normal.   Cardiovascular: Normal rate, regular rhythm, normal heart sounds and intact distal pulses. Exam reveals no gallop and no friction rub. No murmur heard. Pulmonary/Chest: Effort normal. No respiratory distress. Decrease breath sounds   O2 sats remain low after breathing treatment. 81%  Has intermittent cough   Musculoskeletal: Normal range of motion. Neurological: She is alert and oriented to person, place, and time. Appears lethargic   Skin: She is not diaphoretic. Nursing note and vitals reviewed. Plan of care and AVS reviewed with patient who verbalized understanding. ASSESSMENT and PLAN    ICD-10-CM ICD-9-CM    1. Low O2 saturation R79.81 790.91 XR CHEST PA LAT      albuterol (PROVENTIL VENTOLIN) 2.5 mg /3 mL (0.083 %) nebulizer solution      ALBUTEROL, INHAL. SOL., FDA-APPROVED FINAL, NON-COMPOUND UNIT DOSE, 1 MG      INHAL RX, AIRWAY OBST/DX SPUTUM INDUCT   2. Decreased breath sounds R06.89 786.7 XR CHEST PA LAT      albuterol (PROVENTIL VENTOLIN) 2.5 mg /3 mL (0.083 %) nebulizer solution      ALBUTEROL, INHAL. SOL., FDA-APPROVED FINAL, NON-COMPOUND UNIT DOSE, 1 MG      INHAL RX, AIRWAY OBST/DX SPUTUM INDUCT   3. Fever, unspecified fever cause R50.9 780.60 XR CHEST PA LAT   4. Uncontrolled type 2 diabetes mellitus with diabetic nephropathy, with long-term current use of insulin ([facility]) E11.21 250.42 AMB POC GLUCOSE BLOOD, BY GLUCOSE MONITORING DEVICE    E11.65 583.81     Z79.4 V58.67    5. Lethargy R53.83 780.79 AMB POC GLUCOSE BLOOD, BY GLUCOSE MONITORING DEVICE   Suspect pneumonia.   Will send patient to hospital ER for evaluation  Patient refused EMS and elected to drive self to [facility] after taking to spouse. Daughter will accompanied  Patient to ER.

## 2017-08-04 LAB
ALBUMIN SERPL-MCNC: 4.5 G/DL (ref 3.6–4.8)
ALBUMIN/GLOB SERPL: 1.6 {RATIO} (ref 1.2–2.2)
ALP SERPL-CCNC: 219 IU/L (ref 39–117)
ALT SERPL-CCNC: 64 IU/L (ref 0–32)
AST SERPL-CCNC: 73 IU/L (ref 0–40)
BILIRUB SERPL-MCNC: 0.5 MG/DL (ref 0–1.2)
BUN SERPL-MCNC: 9 MG/DL (ref 8–27)
BUN/CREAT SERPL: 13 (ref 12–28)
CALCIUM SERPL-MCNC: 9.7 MG/DL (ref 8.7–10.3)
CHLORIDE SERPL-SCNC: 91 MMOL/L (ref 96–106)
CO2 SERPL-SCNC: 27 MMOL/L (ref 18–29)
CREAT SERPL-MCNC: 0.71 MG/DL (ref 0.57–1)
GLOBULIN SER CALC-MCNC: 2.8 G/DL (ref 1.5–4.5)
GLUCOSE SERPL-MCNC: 51 MG/DL (ref 65–99)
HBA1C MFR BLD HPLC: 9.5 %
POTASSIUM SERPL-SCNC: 3.8 MMOL/L (ref 3.5–5.2)
PROT SERPL-MCNC: 7.3 G/DL (ref 6–8.5)
SODIUM SERPL-SCNC: 139 MMOL/L (ref 134–144)
TSH SERPL DL<=0.005 MIU/L-ACNC: 33.91 UIU/ML (ref 0.45–4.5)

## 2017-08-08 ENCOUNTER — DOCUMENTATION ONLY (OUTPATIENT)
Dept: INTERNAL MEDICINE CLINIC | Age: 62
End: 2017-08-08

## 2017-08-08 ENCOUNTER — HOME HEALTH ADMISSION (OUTPATIENT)
Dept: HOME HEALTH SERVICES | Facility: HOME HEALTH | Age: 62
End: 2017-08-08

## 2017-08-08 ENCOUNTER — TELEPHONE (OUTPATIENT)
Dept: INTERNAL MEDICINE CLINIC | Age: 62
End: 2017-08-08

## 2017-08-08 NOTE — TELEPHONE ENCOUNTER
Patient called in reference to needing more oxygen ordered. She was sent home from the hospital today. They only gave her 2 tanks. Please call her at [phone].

## 2017-08-08 NOTE — PROGRESS NOTES
Pt called stated that she just wanted to inform dr Endy Ruffin that she has gotten the oxygen tank that she was waiting on

## 2017-08-08 NOTE — TELEPHONE ENCOUNTER
Courtney Grijalva, from [facility], in reference to them starting home health on patient tomorrow. If you have any questions she can be reached at [phone].

## 2017-08-08 NOTE — TELEPHONE ENCOUNTER
Chief Complaint   Patient presents with    O2/Oxygen     Patient states that she does not need any oxygen ordered and she has a scheduled upcoming appointment for the 14th of this month. She was admitted on Thursday, August 3 and was released this afternoon.

## 2017-08-09 ENCOUNTER — HOME CARE VISIT (OUTPATIENT)
Dept: SCHEDULING | Facility: HOME HEALTH | Age: 62
End: 2017-08-09

## 2017-08-09 NOTE — TELEPHONE ENCOUNTER
Chief Complaint   Patient presents with   [address] with Natan Stanley and request discharge summary from [facility] and it will be faxed per Natan Stanley.

## 2017-08-11 ENCOUNTER — TELEPHONE (OUTPATIENT)
Dept: INTERNAL MEDICINE CLINIC | Age: 62
End: 2017-08-11

## 2017-08-11 NOTE — TELEPHONE ENCOUNTER
Claudell Ferraris, from [facility], called in reference to going to patients home Wednesday and she refused services. If you have any questions she can be reached at [phone].

## 2017-08-14 ENCOUNTER — OFFICE VISIT (OUTPATIENT)
Dept: INTERNAL MEDICINE CLINIC | Age: 62
End: 2017-08-14

## 2017-08-14 VITALS
TEMPERATURE: 98.3 F | SYSTOLIC BLOOD PRESSURE: 124 MMHG | HEIGHT: 69 IN | RESPIRATION RATE: 17 BRPM | HEART RATE: 85 BPM | DIASTOLIC BLOOD PRESSURE: 84 MMHG | OXYGEN SATURATION: 100 % | WEIGHT: 130.8 LBS | BODY MASS INDEX: 19.37 KG/M2

## 2017-08-14 DIAGNOSIS — Z12.11 SCREENING FOR COLON CANCER: ICD-10-CM

## 2017-08-14 DIAGNOSIS — J44.9 CHRONIC OBSTRUCTIVE PULMONARY DISEASE, UNSPECIFIED COPD TYPE (HCC): Primary | ICD-10-CM

## 2017-08-14 DIAGNOSIS — E11.9 CONTROLLED TYPE 2 DIABETES MELLITUS WITHOUT COMPLICATION, WITHOUT LONG-TERM CURRENT USE OF INSULIN (HCC): ICD-10-CM

## 2017-08-14 DIAGNOSIS — F32.A DEPRESSION, UNSPECIFIED DEPRESSION TYPE: ICD-10-CM

## 2017-08-14 PROBLEM — J44.0 COPD WITH ACUTE BRONCHITIS (HCC): Status: ACTIVE | Noted: 2017-08-03

## 2017-08-14 PROBLEM — Z91.81 AT HIGH RISK FOR FALLS: Status: ACTIVE | Noted: 2017-08-04

## 2017-08-14 PROBLEM — J40 BRONCHITIS: Status: ACTIVE | Noted: 2017-08-03

## 2017-08-14 PROBLEM — F17.200 TOBACCO DEPENDENCE: Status: ACTIVE | Noted: 2017-08-03

## 2017-08-14 PROBLEM — R63.6 PATIENT UNDERWEIGHT: Status: ACTIVE | Noted: 2017-08-05

## 2017-08-14 PROBLEM — J20.9 COPD WITH ACUTE BRONCHITIS (HCC): Status: ACTIVE | Noted: 2017-08-03

## 2017-08-14 RX ORDER — IPRATROPIUM BROMIDE AND ALBUTEROL SULFATE 2.5; .5 MG/3ML; MG/3ML
3 SOLUTION RESPIRATORY (INHALATION)
Qty: 30 NEBULE | Refills: 5 | Status: SHIPPED | OUTPATIENT
Start: 2017-08-14 | End: 2019-01-08 | Stop reason: SDUPTHER

## 2017-08-14 RX ORDER — ALBUTEROL SULFATE 0.83 MG/ML
2.5 SOLUTION RESPIRATORY (INHALATION)
COMMUNITY
Start: 2017-08-08 | End: 2017-08-14 | Stop reason: ALTCHOICE

## 2017-08-14 RX ORDER — ATORVASTATIN CALCIUM 10 MG/1
TABLET, FILM COATED ORAL
Refills: 0 | COMMUNITY
Start: 2017-08-08 | End: 2019-01-08 | Stop reason: ALTCHOICE

## 2017-08-14 RX ORDER — SERTRALINE HYDROCHLORIDE 100 MG/1
50 TABLET, FILM COATED ORAL DAILY
Qty: 90 TAB | Refills: 1 | Status: SHIPPED | OUTPATIENT
Start: 2017-08-14 | End: 2018-08-23 | Stop reason: SDUPTHER

## 2017-08-14 RX ORDER — IPRATROPIUM BROMIDE 0.5 MG/2.5ML
500 SOLUTION RESPIRATORY (INHALATION)
COMMUNITY
Start: 2017-08-08 | End: 2017-08-14 | Stop reason: ALTCHOICE

## 2017-08-14 RX ORDER — PREDNISONE 20 MG/1
TABLET ORAL
Refills: 0 | COMMUNITY
Start: 2017-08-08 | End: 2017-08-14 | Stop reason: ALTCHOICE

## 2017-08-14 RX ORDER — IBUPROFEN 200 MG
1 TABLET ORAL
COMMUNITY
Start: 2017-08-08 | End: 2017-09-07

## 2017-08-14 NOTE — PROGRESS NOTES
Chief Complaint   Patient presents with   [facility] Follow Up     BRONCHITIS ([address] )     1. Have you been to the ER, urgent care clinic since your last visit? Hospitalized since your last visit? Yes When: August 2017 Where: [facility] Reason for visit: Bronchitis and Respiratory Failure    2. Have you seen or consulted any other health care providers outside of the [facility] since your last visit? Include any pap smears or colon screening. No     There are no preventive care reminders to display for this patient.

## 2017-08-14 NOTE — MR AVS SNAPSHOT
Visit Information Date & Time Provider Department Dept. Phone Encounter #  
 8/14/2017 11:00 AM Halle Medellin NP [facility] [phone] [id number] Follow-up Instructions Return in about 2 months (around 10/14/2017) for COPD/ DM. Upcoming Health Maintenance Date Due Hepatitis C Screening 8/30/2017* FOOT EXAM Q1 8/30/2017* BREAST CANCER SCRN MAMMOGRAM 8/30/2017* PAP AKA CERVICAL CYTOLOGY 8/30/2017* FOBT Q 1 YEAR AGE 50-75 8/30/2017* EYE EXAM RETINAL OR DILATED Q1 11/18/2017 HEMOGLOBIN A1C Q6M 1/10/2018 LIPID PANEL Q1 7/10/2018 MICROALBUMIN Q1 7/19/2018 DTaP/Tdap/Td series (2 - Td) 5/12/2027 *Topic was postponed. The date shown is not the original due date. Allergies as of 8/14/2017  Review Complete On: 8/14/2017 By: Halle Medellin NP No Known Allergies Current Immunizations  Never Reviewed Name Date Influenza Vaccine 10/28/2016 12:00 AM, 11/12/2012 12:00 AM, 10/18/2012 12:00 AM, 11/17/2009 12:00 AM  
 Pneumococcal Polysaccharide (PPSV-23) 10/18/2012 12:00 AM  
 Zoster Vaccine, Live 11/12/2012 12:00 AM  
  
 Not reviewed this visit You Were Diagnosed With   
  
 Codes Comments Chronic obstructive pulmonary disease, unspecified COPD type ([facility]    -  Primary ICD-10-CM: J44.9 ICD-9-CM: 320 Depression, unspecified depression type     ICD-10-CM: F32.9 ICD-9-CM: 462 Screening for colon cancer     ICD-10-CM: Z12.11 ICD-9-CM: V76.51 Controlled type 2 diabetes mellitus without complication, without long-term current use of insulin ([facility]     ICD-10-CM: E11.9 ICD-9-CM: 250.00 Vitals BP Pulse Temp Resp Height(growth percentile) Weight(growth percentile) 124/84 (BP 1 Location: Left arm, BP Patient Position: Sitting) 85 98.3 °F (36.8 °C) (Oral) 17 5' 9\" (1.753 m) 130 lb 12.8 oz (59.3 kg) SpO2 BMI OB Status Smoking Status 100% 19.32 kg/m2 Postmenopausal Former Smoker Vitals History BMI and BSA Data Body Mass Index Body Surface Area  
 19.32 kg/m 2 1.7 m 2 Preferred Pharmacy Pharmacy Name Phone [facility] [address] [facility] [phone] Your Updated Medication List  
  
   
This list is accurate as of: 8/14/17 11:38 AM.  Always use your most recent med list.  
  
  
  
  
 albuterol-ipratropium 2.5 mg-0.5 mg/3 ml Nebu Commonly known as:  DUO-NEB  
3 mL by Nebulization route every four (4) hours as needed. aspirin delayed-release 81 mg tablet Take 1 Tab by mouth daily. * atorvastatin 20 mg tablet Commonly known as:  LIPITOR Take 1 Tab by mouth daily. * atorvastatin 10 mg tablet Commonly known as:  LIPITOR  
  
 BD INSULIN SYRINGE ULTRA-FINE 1 mL 31 gauge x 5/16 Syrg Generic drug:  Insulin Syringe-Needle U-100  
  
 busPIRone 7.5 mg tablet Commonly known as:  BUSPAR Take 7.5 mg by mouth two (2) times a day. insulin glargine 100 unit/mL injection Commonly known as:  LANTUS Take 30 units daily  
  
 insulin NPH/insulin regular 100 unit/mL (70-30) injection Commonly known as:  NOVOLIN 70/30, HUMULIN 70/30 Take 20 uints in the AM and 20 uints in the PM. levothyroxine 125 mcg tablet Commonly known as:  SYNTHROID Take 1 Tab by mouth daily. lisinopril 20 mg tablet Commonly known as:  Velton [place] Take 1 Tab by mouth daily. nicotine 21 mg/24 hr  
Commonly known as:  NICODERM CQ  
1 Patch by TransDERmal route. One Touch Delica 33 gauge Misc Generic drug:  lancets  
use as directed by prescriber four times a day with meals and at bedtime ONETOUCH ULTRA TEST strip Generic drug:  glucose blood VI test strips  
use as directed by prescriber four times a day with meals and at bedtime Oxygen Indications: 3 mL OXYGEN-AIR DELIVERY SYSTEMS  
by Does Not Apply route. Indications: [employer]  
  
 pregabalin 100 mg capsule Commonly known as:  Pete May Take 1 Cap by mouth two (2) times a day. Max Daily Amount: 200 mg.  
  
 sertraline 100 mg tablet Commonly known as:  ZOLOFT Take 0.5 Tabs by mouth daily. SUBOXONE 8-2 mg Film sublingaul film Generic drug:  buprenorphine-naloxone  
dissolve 2 and 1/2 (TWO AND A HALF) FILMS under the tongue daily * Notice: This list has 2 medication(s) that are the same as other medications prescribed for you. Read the directions carefully, and ask your doctor or other care provider to review them with you. Prescriptions Sent to Pharmacy Refills  
 sertraline (ZOLOFT) 100 mg tablet 1 Sig: Take 0.5 Tabs by mouth daily. Class: Normal  
 Pharmacy: [place] [address], [facility] [address] [facility] Ph #: [phone] Route: Oral  
 albuterol-ipratropium (DUO-NEB) 2.5 mg-0.5 mg/3 ml nebu 5 Sig: 3 mL by Nebulization route every four (4) hours as needed. Class: Normal  
 Pharmacy: [address] [facility] Ph #: [phone] Route: Nebulization We Performed the Following AMB SUPPLY ORDER [[id number] Custom] Comments:  
 Portable Oxygen concentrator  DIABETES FOOT EXAM [7 Custom] OCCULT BLOOD, IMMUNOASSAY (FIT) [id number] CPT(R)] Follow-up Instructions Return in about 2 months (around 10/14/2017) for COPD/ DM. Introducing [facility]! Clarita Weldon introduces [employer] patient portal. Now you can access parts of your medical record, email your doctor's office, and request medication refills online. 1. In your internet browser, go to [email or web address]. [email or web address] 2. Click on the First Time User? Click Here link in the Sign In box. You will see the New Member Sign Up page. 3. Enter your [employer] Access Code exactly as it appears below. You will not need to use this code after youve completed the sign-up process.  If you do not sign up before the expiration date, you must request a new code. · [employer] Access Code: [email or web address] Expires: 11/8/2017  9:56 PM 
 
4. Enter the last four digits of your Social Security Number (xxxx) and Date of Birth (mm/dd/yyyy) as indicated and click Submit. You will be taken to the next sign-up page. 5. Create a [employer] ID. This will be your [employer] login ID and cannot be changed, so think of one that is secure and easy to remember. 6. Create a [employer] password. You can change your password at any time. 7. Enter your Password Reset Question and Answer. This can be used at a later time if you forget your password. 8. Enter your e-mail address. You will receive e-mail notification when new information is available in [address] 9. Click Sign Up. You can now view and download portions of your medical record. 10. Click the Download Summary menu link to download a portable copy of your medical information. If you have questions, please visit the Frequently Asked Questions section of the [employer] website. Remember, [employer] is NOT to be used for urgent needs. For medical emergencies, dial 911. Now available from your iPhone and Android! Please provide this summary of care documentation to your next provider. Your primary care clinician is listed as Teri Paris. If you have any questions after today's visit, please call [phone].

## 2017-08-14 NOTE — PROGRESS NOTES
HISTORY OF PRESENT ILLNESS  Dionicio Berger is a 59 y.o. female. HPI  Chief Complaint   Patient presents with   [facility] Follow Up     BRONCHITIS ([address] )     Patient in with Oxygen 3 liters nasal cannula  Patient is requesting portable  O2 concentrator she could carry on shoulder. Is currently getting O2 from [address]. Has appointment to see pulmonologist 8/29 Dr. Carolyn Radford  Request refill pulmonary meds. Admits to taking meds for DM. [facility] has not been keeping a log but blood sugars range from 48 -400. Fasting blood sugar this AM 77  [address]  Is scheduled to see Dr. Manny Louis 11/17. Review of Systems   Constitutional: Negative for chills and fever. HENT: Negative. Eyes: Negative. Respiratory: Negative. Negative for cough, shortness of breath and wheezing. C/O mild SOB with exertion  Sleeps in recliner at home   Cardiovascular: Positive for leg swelling. Negative for chest pain. [facility] has been having mild swelling of lower extremties since hospitalization   Gastrointestinal: Negative for abdominal pain, diarrhea, nausea and vomiting. Genitourinary: Negative. Negative for flank pain. Musculoskeletal: Negative. Skin: Negative. Physical Exam   Constitutional: She is oriented to person, place, and time. She appears well-developed and well-nourished. No distress. HENT:   Head: Normocephalic and atraumatic. Eyes: Conjunctivae are normal.   Neck: Normal range of motion. Neck supple. Cardiovascular: Normal rate, regular rhythm, normal heart sounds and intact distal pulses. Exam reveals no gallop and no friction rub. No murmur heard. Pulmonary/Chest: Effort normal. No respiratory distress. She has no wheezes. Positive for decreased lung sounds. Abdominal: Soft. Bowel sounds are normal. She exhibits no distension. There is no tenderness. There is no rebound. Musculoskeletal: Normal range of motion. She exhibits no edema, tenderness or deformity. Neurological: She is alert and oriented to person, place, and time. Skin: Skin is warm and dry. She is not diaphoretic. Nursing note and vitals reviewed. Diabetic foot exam performed by Karuna Sifuentes RN NPs      Measurement  Response Nurse Comment Physician Comment   Monofilament  R - normal sensation with micro filament  L - normal sensation with micro filament     Pulse DP R - present  L - present     Pulse TP R - present  L - present     Structural deformity R - None  L - None     Skin Integrity / Deformity R - None  L - None       Reviewed by Leandro Little DNP, FNP-BC  Plan of care and AVS reviewed with patient who verbalized understanding. ASSESSMENT and PLANn    ICD-10-CM ICD-9-CM    1. Chronic obstructive pulmonary disease, unspecified COPD type ([facility]) J44.9 496 AMB SUPPLY ORDER      albuterol-ipratropium (DUO-NEB) 2.5 mg-0.5 mg/3 ml nebu   2. Depression, unspecified depression type F32.9 311 sertraline (ZOLOFT) 100 mg tablet   3. Screening for colon cancer Z12.11 V76.51 OCCULT BLOOD, IMMUNOASSAY (FIT)   4. Controlled type 2 diabetes mellitus without complication, without long-term current use of insulin ([facility]) E11.9 250.00  DIABETES FOOT EXAM   F/U 1 month. Blood sugar logs given to patient to record blood sugars.

## 2017-08-16 ENCOUNTER — TELEPHONE (OUTPATIENT)
Dept: INTERNAL MEDICINE CLINIC | Age: 62
End: 2017-08-16

## 2017-08-16 NOTE — TELEPHONE ENCOUNTER
Patient called in reference to wanting a travel oxygen. She said the bigger tanks are a lot to toe. Please call her at [phone]. It comes from [facility].

## 2017-08-17 NOTE — TELEPHONE ENCOUNTER
Chief Complaint   Patient presents with    O2/Oxygen    Returning Call     [address]     Left message to (John Cason) for the patient to return my call.

## 2017-08-22 ENCOUNTER — TELEPHONE (OUTPATIENT)
Dept: INTERNAL MEDICINE CLINIC | Age: 62
End: 2017-08-22

## 2017-08-22 NOTE — TELEPHONE ENCOUNTER
Pt stated that she needed the nurse/dr long to call her back asap , stated that she is on oxygen and has other issues going on and she doesn't know If the other provider shared with her and she wanted to speak with her asap

## 2017-08-22 NOTE — TELEPHONE ENCOUNTER
Chief Complaint   Patient presents with   Lamar Whitman with call  who informed me that someone will return my call within 24 hours.

## 2017-09-14 ENCOUNTER — OFFICE VISIT (OUTPATIENT)
Dept: INTERNAL MEDICINE CLINIC | Age: 62
End: 2017-09-14

## 2017-09-14 VITALS
HEART RATE: 81 BPM | DIASTOLIC BLOOD PRESSURE: 72 MMHG | TEMPERATURE: 98.2 F | HEIGHT: 69 IN | RESPIRATION RATE: 17 BRPM | SYSTOLIC BLOOD PRESSURE: 134 MMHG | WEIGHT: 129.2 LBS | OXYGEN SATURATION: 96 % | BODY MASS INDEX: 19.13 KG/M2

## 2017-09-14 DIAGNOSIS — I10 ESSENTIAL HYPERTENSION: ICD-10-CM

## 2017-09-14 DIAGNOSIS — L03.119 CELLULITIS OF LOWER EXTREMITY, UNSPECIFIED LATERALITY: ICD-10-CM

## 2017-09-14 DIAGNOSIS — Z23 ENCOUNTER FOR IMMUNIZATION: ICD-10-CM

## 2017-09-14 DIAGNOSIS — R60.0 LOCALIZED EDEMA: ICD-10-CM

## 2017-09-14 DIAGNOSIS — J44.9 CHRONIC OBSTRUCTIVE PULMONARY DISEASE, UNSPECIFIED COPD TYPE (HCC): ICD-10-CM

## 2017-09-14 DIAGNOSIS — E07.9 THYROID DISEASE: ICD-10-CM

## 2017-09-14 RX ORDER — IBUPROFEN 200 MG
1 TABLET ORAL
COMMUNITY
Start: 2017-08-08 | End: 2017-10-03 | Stop reason: ALTCHOICE

## 2017-09-14 RX ORDER — IPRATROPIUM BROMIDE 0.5 MG/2.5ML
500 SOLUTION RESPIRATORY (INHALATION)
COMMUNITY
Start: 2017-08-08 | End: 2017-10-03 | Stop reason: ALTCHOICE

## 2017-09-14 RX ORDER — FUROSEMIDE 20 MG/1
20 TABLET ORAL DAILY
Qty: 10 TAB | Refills: 0 | Status: SHIPPED | OUTPATIENT
Start: 2017-09-14 | End: 2017-09-24

## 2017-09-14 RX ORDER — CEPHALEXIN 500 MG/1
500 CAPSULE ORAL 2 TIMES DAILY
Qty: 20 CAP | Refills: 0 | Status: SHIPPED | OUTPATIENT
Start: 2017-09-14 | End: 2017-09-24

## 2017-09-14 RX ORDER — LEVOTHYROXINE SODIUM 150 UG/1
150 TABLET ORAL
COMMUNITY
Start: 2017-08-08 | End: 2017-09-14 | Stop reason: ALTCHOICE

## 2017-09-14 NOTE — MR AVS SNAPSHOT
Visit Information Date & Time Provider Department Dept. Phone Encounter #  
 9/14/2017 11:00 AM Aniya Bolanos NP [facility] [phone] [id number] Follow-up Instructions Return in about 3 months (around 12/14/2017) for well woman exam. Upcoming Health Maintenance Date Due Hepatitis C Screening 1955 PAP AKA CERVICAL CYTOLOGY 11/25/1974 BREAST CANCER SCRN MAMMOGRAM 11/25/2003 FOBT Q 1 YEAR AGE 50-75 11/25/2003 EYE EXAM RETINAL OR DILATED Q1 11/18/2017 HEMOGLOBIN A1C Q6M 2/4/2018 LIPID PANEL Q1 7/10/2018 MICROALBUMIN Q1 7/19/2018 FOOT EXAM Q1 8/14/2018 DTaP/Tdap/Td series (2 - Td) 5/12/2027 Allergies as of 9/14/2017  Review Complete On: 9/14/2017 By: Roel Chopra LPN No Known Allergies Current Immunizations  Never Reviewed Name Date Influenza Vaccine 10/28/2016 12:00 AM, 11/12/2012 12:00 AM, 10/18/2012 12:00 AM, 11/17/2009 12:00 AM  
 Influenza Vaccine (Quad) PF  Incomplete Pneumococcal Polysaccharide (PPSV-23) 10/18/2012 12:00 AM  
 Zoster Vaccine, Live 11/12/2012 12:00 AM  
  
 Not reviewed this visit You Were Diagnosed With   
  
 Codes Comments Uncontrolled type 2 diabetes mellitus with diabetic nephropathy, with long-term current use of insulin ([facility] [address]    -  Primary ICD-10-CM: E11.21, E11.65, Z79.4 ICD-9-CM: 250.42, 583.81, V58.67 Thyroid disease     ICD-10-CM: E07.9 ICD-9-CM: 246.9 Essential hypertension     ICD-10-CM: I10 
ICD-9-CM: 401.9 Localized edema     ICD-10-CM: R60.0 ICD-9-CM: 928. 3 Cellulitis of lower extremity, unspecified laterality     ICD-10-CM: L03.119 ICD-9-CM: 682.6 Encounter for immunization     ICD-10-CM: H56 ICD-9-CM: V03.89 Vitals BP Pulse Temp Resp Height(growth percentile) Weight(growth percentile) 151/78 (BP 1 Location: Left arm, BP Patient Position: Sitting) 84 98.2 °F (36.8 °C) (Oral) 17 5' 9\" (1.753 m) 129 lb 3.2 oz (58.6 kg) SpO2 BMI OB Status Smoking Status 96% 19.08 kg/m2 Postmenopausal Former Smoker Vitals History BMI and BSA Data Body Mass Index Body Surface Area 19.08 kg/m 2 1.69 m 2 Preferred Pharmacy Pharmacy Name Phone [facility] [phone] Your Updated Medication List  
  
   
This list is accurate as of: 9/14/17 11:41 AM.  Always use your most recent med list.  
  
  
  
  
 albuterol-ipratropium 2.5 mg-0.5 mg/3 ml Nebu Commonly known as:  DUO-NEB  
3 mL by Nebulization route every four (4) hours as needed. aspirin delayed-release 81 mg tablet Take 1 Tab by mouth daily. * atorvastatin 20 mg tablet Commonly known as:  LIPITOR Take 1 Tab by mouth daily. * atorvastatin 10 mg tablet Commonly known as:  LIPITOR  
  
 BD INSULIN SYRINGE ULTRA-FINE 1 mL 31 gauge x 5/16 Syrg Generic drug:  Insulin Syringe-Needle U-100  
  
 busPIRone 7.5 mg tablet Commonly known as:  BUSPAR Take 7.5 mg by mouth two (2) times a day. cephALEXin 500 mg capsule Commonly known as:  Ralene Plum Take 1 Cap by mouth two (2) times a day for 10 days. furosemide 20 mg tablet Commonly known as:  LASIX Take 1 Tab by mouth daily for 10 days. insulin glargine 100 unit/mL injection Commonly known as:  LANTUS Take 30 units daily  
  
 insulin NPH/insulin regular 100 unit/mL (70-30) injection Commonly known as:  NOVOLIN 70/30, HUMULIN 70/30 Take 20 uints in the AM and 20 uints in the PM. ipratropium 0.02 % Soln Commonly known as:  ATROVENT Take 500 mcg by inhalation. levothyroxine 125 mcg tablet Commonly known as:  SYNTHROID Take 1 Tab by mouth daily. lisinopril 20 mg tablet Commonly known as:  Arcenio Feliciano Take 1 Tab by mouth daily. nicotine 21 mg/24 hr  
Commonly known as:  NICODERM CQ  
1 Patch by TransDERmal route. One Touch Delica 33 gauge Misc Generic drug:  lancets  
use as directed by prescriber four times a day with meals and at bedtime ONETOUCH ULTRA TEST strip Generic drug:  glucose blood VI test strips  
use as directed by prescriber four times a day with meals and at bedtime Oxygen Indications: 3 mL OXYGEN-AIR DELIVERY SYSTEMS  
by Does Not Apply route. Indications: [employer]  
  
 pregabalin 100 mg capsule Commonly known as:  Tina Callum Take 1 Cap by mouth two (2) times a day. Max Daily Amount: 200 mg.  
  
 sertraline 100 mg tablet Commonly known as:  ZOLOFT Take 0.5 Tabs by mouth daily. SUBOXONE 8-2 mg Film sublingaul film Generic drug:  buprenorphine-naloxone  
dissolve 2 and 1/2 (TWO AND A HALF) FILMS under the tongue daily * Notice: This list has 2 medication(s) that are the same as other medications prescribed for you. Read the directions carefully, and ask your doctor or other care provider to review them with you. Prescriptions Sent to Pharmacy Refills  
 furosemide (LASIX) 20 mg tablet 0 Sig: Take 1 Tab by mouth daily for 10 days. Class: Normal  
 Pharmacy: [address] [facility] Ph #: [phone] Route: Oral  
 cephALEXin (KEFLEX) 500 mg capsule 0 Sig: Take 1 Cap by mouth two (2) times a day for 10 days. Class: Normal  
 Pharmacy: [address] [facility] Ph #: [phone] Route: Oral  
  
We Performed the Following CBC WITH AUTOMATED DIFF [15878 CPT(R)] INFLUENZA VIRUS VAC QUAD,SPLIT,PRESV FREE SYRINGE IM [id number] CPT(R)] METABOLIC PANEL, COMPREHENSIVE [26595 CPT(R)] TSH 3RD GENERATION [18511 CPT(R)] Follow-up Instructions Return in about 3 months (around 12/14/2017) for well woman exam.  
  
  
Introducing [facility]!    
 Martha Green introduces [employer] patient portal. Now you can access parts of your medical record, email your doctor's office, and request medication refills online. 1. In your internet browser, go to [email or web address] [email or web address] 2. Click on the First Time User? Click Here link in the Sign In box. You will see the New Member Sign Up page. 3. Enter your [employer] Access Code exactly as it appears below. You will not need to use this code after youve completed the sign-up process. If you do not sign up before the expiration date, you must request a new code. · [employer] Access Code: [email or web address] Expires: 11/8/2017  9:56 PM 
 
4. Enter the last four digits of your Social Security Number (xxxx) and Date of Birth (mm/dd/yyyy) as indicated and click Submit. You will be taken to the next sign-up page. 5. Create a [employer] ID. This will be your [employer] login ID and cannot be changed, so think of one that is secure and easy to remember. 6. Create a [employer] password. You can change your password at any time. 7. Enter your Password Reset Question and Answer. This can be used at a later time if you forget your password. 8. Enter your e-mail address. You will receive e-mail notification when new information is available in [address] 9. Click Sign Up. You can now view and download portions of your medical record. 10. Click the Download Summary menu link to download a portable copy of your medical information. If you have questions, please visit the Frequently Asked Questions section of the [employer] website. Remember, [employer] is NOT to be used for urgent needs. For medical emergencies, dial 911. Now available from your iPhone and Android! Please provide this summary of care documentation to your next provider. Your primary care clinician is listed as Howie Richard. If you have any questions after today's visit, please call [phone].

## 2017-09-14 NOTE — PROGRESS NOTES
Chief Complaint   Patient presents with    COPD     FOLLOW UP; COMPLETED PFT    Numbness     RIGHT HAND; FEET SWELLING     1. Have you been to the ER, urgent care clinic since your last visit? Hospitalized since your last visit? No    2. Have you seen or consulted any other health care providers outside of the [address] [place] [address] since your last visit? Include any pap smears or colon screening. No     Health Maintenance Due   Topic Date Due    Hepatitis C Screening  1955    PAP AKA CERVICAL CYTOLOGY  11/25/1974    BREAST CANCER SCRN MAMMOGRAM  11/25/2003    FOBT Q 1 YEAR AGE 50-75  11/25/2003     Do you have an [address] in place in the event that you have a healthcare crisis that could impact your decision making as it pertains to your health? NO    Would you like information about Advance Care Planning? NO    Information given. NO    Patient requests flu vaccine.

## 2017-09-15 ENCOUNTER — DOCUMENTATION ONLY (OUTPATIENT)
Dept: INTERNAL MEDICINE CLINIC | Age: 62
End: 2017-09-15

## 2017-09-15 DIAGNOSIS — D50.9 IRON DEFICIENCY ANEMIA, UNSPECIFIED IRON DEFICIENCY ANEMIA TYPE: Primary | ICD-10-CM

## 2017-09-15 DIAGNOSIS — E07.9 THYROID DISEASE: ICD-10-CM

## 2017-09-15 LAB
ALBUMIN SERPL-MCNC: 4.6 G/DL (ref 3.6–4.8)
ALBUMIN/GLOB SERPL: 2.1 {RATIO} (ref 1.2–2.2)
ALP SERPL-CCNC: 99 IU/L (ref 39–117)
ALT SERPL-CCNC: 14 IU/L (ref 0–32)
AST SERPL-CCNC: 16 IU/L (ref 0–40)
BASOPHILS # BLD AUTO: 0 X10E3/UL (ref 0–0.2)
BASOPHILS NFR BLD AUTO: 0 %
BILIRUB SERPL-MCNC: 0.4 MG/DL (ref 0–1.2)
BUN SERPL-MCNC: 13 MG/DL (ref 8–27)
BUN/CREAT SERPL: 20 (ref 12–28)
CALCIUM SERPL-MCNC: 9.2 MG/DL (ref 8.7–10.3)
CHLORIDE SERPL-SCNC: 98 MMOL/L (ref 96–106)
CO2 SERPL-SCNC: 30 MMOL/L (ref 18–29)
CREAT SERPL-MCNC: 0.65 MG/DL (ref 0.57–1)
EOSINOPHIL # BLD AUTO: 0.1 X10E3/UL (ref 0–0.4)
EOSINOPHIL NFR BLD AUTO: 2 %
ERYTHROCYTE [DISTWIDTH] IN BLOOD BY AUTOMATED COUNT: 14.1 % (ref 12.3–15.4)
GLOBULIN SER CALC-MCNC: 2.2 G/DL (ref 1.5–4.5)
GLUCOSE SERPL-MCNC: 74 MG/DL (ref 65–99)
HCT VFR BLD AUTO: 34.4 % (ref 34–46.6)
HGB BLD-MCNC: 10.9 G/DL (ref 11.1–15.9)
IMM GRANULOCYTES # BLD: 0 X10E3/UL (ref 0–0.1)
IMM GRANULOCYTES NFR BLD: 0 %
LYMPHOCYTES # BLD AUTO: 2 X10E3/UL (ref 0.7–3.1)
LYMPHOCYTES NFR BLD AUTO: 25 %
MCH RBC QN AUTO: 31.1 PG (ref 26.6–33)
MCHC RBC AUTO-ENTMCNC: 31.7 G/DL (ref 31.5–35.7)
MCV RBC AUTO: 98 FL (ref 79–97)
MONOCYTES # BLD AUTO: 0.9 X10E3/UL (ref 0.1–0.9)
MONOCYTES NFR BLD AUTO: 11 %
NEUTROPHILS # BLD AUTO: 5 X10E3/UL (ref 1.4–7)
NEUTROPHILS NFR BLD AUTO: 62 %
PLATELET # BLD AUTO: 374 X10E3/UL (ref 150–379)
POTASSIUM SERPL-SCNC: 4.7 MMOL/L (ref 3.5–5.2)
PROT SERPL-MCNC: 6.8 G/DL (ref 6–8.5)
RBC # BLD AUTO: 3.5 X10E6/UL (ref 3.77–5.28)
SODIUM SERPL-SCNC: 141 MMOL/L (ref 134–144)
TSH SERPL DL<=0.005 MIU/L-ACNC: 10.74 UIU/ML (ref 0.45–4.5)
WBC # BLD AUTO: 8 X10E3/UL (ref 3.4–10.8)

## 2017-09-15 RX ORDER — LEVOTHYROXINE SODIUM 150 UG/1
150 TABLET ORAL DAILY
Qty: 30 TAB | Refills: 5 | Status: SHIPPED | OUTPATIENT
Start: 2017-09-15 | End: 2018-04-27 | Stop reason: ALTCHOICE

## 2017-09-15 NOTE — PROGRESS NOTES
Chief Complaint   Patient presents with    New Order     PORTABLE OXYGEN TANKS     Order with insurance and progress notes faxed to ([address] and was received.

## 2017-09-19 ENCOUNTER — TELEPHONE (OUTPATIENT)
Dept: INTERNAL MEDICINE CLINIC | Age: 62
End: 2017-09-19

## 2017-09-19 NOTE — TELEPHONE ENCOUNTER
Pt called in reference to wanting to let  Dr. Mable Guo know that she has not heard anything from either company about her back pack. Please call her at [phone].

## 2017-09-19 NOTE — TELEPHONE ENCOUNTER
Chief Complaint   Patient presents with   [facility] Other    New Order     PORTABLE OXYGEN TANKS     [facility] is not contracted with [facility]. Representative states that they don't have a record of the patient either as active or inactive. Order for portable oxygen tanks has been faxed to [phone] and was received. Left message on the status of [facility].

## 2017-09-19 NOTE — PROGRESS NOTES
HISTORY OF PRESENT ILLNESS  Didier Mohamud is a 59 y.o. female. HPI  Chief Complaint   Patient presents with    COPD     FOLLOW UP; COMPLETED PFT    Numbness     RIGHT HAND; FEET SWELLING     Patient is using O2 and using tank. Request portable tank to carry over shoulder. Patient C/O legs feeling like they are burning, and tight x 2-3 weeks. Denies treatment. Patient in agreement with labs that are due. States has been taking insulin as prescribed. FBS log  97 - 130's. Review of Systems   Constitutional: Negative for chills, fever and malaise/fatigue. HENT: Negative. Eyes: Negative. Negative for blurred vision and double vision. Respiratory: Negative. Negative for cough, shortness of breath and wheezing. Cardiovascular: Positive for leg swelling. Negative for chest pain. Gastrointestinal: Negative for abdominal pain, nausea and vomiting. Genitourinary: Negative. Skin: Positive for rash. B legs with redness and rash. Physical Exam   Constitutional: She appears well-developed and well-nourished. No distress. HENT:   Head: Normocephalic and atraumatic. Eyes: Conjunctivae are normal.   Skin: She is not diaphoretic. Nursing note and vitals reviewed. Plan of care and AVS reviewed with patient who verbalized understanding. ASSESSMENT and PLAN    ICD-10-CM ICD-9-CM    1. Uncontrolled type 2 diabetes mellitus with diabetic nephropathy, with long-term current use of insulin (HCC) E11.21 250.42 PA HANDLG&/OR CONVEY OF SPEC FOR TR OFFICE TO LAB    E11.65 583.81 PA COLLECTION VENOUS BLOOD,VENIPUNCTURE    Z79.4 V58.67    2. Thyroid disease E07.9 246.9 TSH 3RD GENERATION      PA HANDLG&/OR CONVEY OF SPEC FOR TR OFFICE TO LAB      PA COLLECTION VENOUS BLOOD,VENIPUNCTURE   3. Essential hypertension H18 270.3 METABOLIC PANEL, COMPREHENSIVE      PA HANDLG&/OR CONVEY OF SPEC FOR TR OFFICE TO LAB      PA COLLECTION VENOUS BLOOD,VENIPUNCTURE   4.  Localized edema R60.0 782.3 furosemide (LASIX) 20 mg tablet   5. Cellulitis of lower extremity, unspecified laterality L03.119 682.6 CBC WITH AUTOMATED DIFF      cephALEXin (KEFLEX) 500 mg capsule   6. Encounter for immunization Z23 V03.89 INFLUENZA VIRUS VAC QUAD,SPLIT,PRESV FREE SYRINGE IM   7. Chronic obstructive pulmonary disease, unspecified COPD [facility] J44.9 496 AMB SUPPLY ORDER   Nursing to assist patient with oxygen from [facility]  Started keflex  Started furosemide  F/U 3 months DM.

## 2017-09-21 NOTE — TELEPHONE ENCOUNTER
Chief Complaint   Patient presents with    Other    New Order     PORTABLE OXYGEN TANKS     Informed by representative that the office does not service the patient's area. Progress notes, insurance, and order faxed to the [place] office [phone] and was received.

## 2017-09-25 ENCOUNTER — TELEPHONE (OUTPATIENT)
Dept: INTERNAL MEDICINE CLINIC | Age: 62
End: 2017-09-25

## 2017-09-25 NOTE — TELEPHONE ENCOUNTER
Chief Complaint   Patient presents with    Other    Results     LABS     Patient is inquiring about pulmonary tests that was done at [facility] two weeks ago.

## 2017-09-29 NOTE — TELEPHONE ENCOUNTER
Chief Complaint   Patient presents with    Leg Swelling    Results     LABS     Patient called in to state that both legs and feet are hurting and swollen. She requests that more medication be called into the pharmacy. Informed patient per Carlos Guerra DNP that an appointment is required related to cellulitis of both legs. Appointment scheduled.

## 2017-10-02 ENCOUNTER — TELEPHONE (OUTPATIENT)
Dept: INTERNAL MEDICINE CLINIC | Age: 62
End: 2017-10-02

## 2017-10-02 NOTE — TELEPHONE ENCOUNTER
Patient would like a call back in reference to Kimo Lange. She said that it is very important to speak with the nurse. Mrs. Danelle Ledezma said that Abel Dent is telling her one thing, and the nurse is telling her another. Please call her at [phone].

## 2017-10-02 NOTE — TELEPHONE ENCOUNTER
Chief Complaint   Patient presents with    Other    New Order     PORTABLE OXYGEN TANKS     LM for the [facility] office to return my call to discuss the new order faxed.

## 2017-10-03 ENCOUNTER — OFFICE VISIT (OUTPATIENT)
Dept: INTERNAL MEDICINE CLINIC | Age: 62
End: 2017-10-03

## 2017-10-03 VITALS
BODY MASS INDEX: 21.18 KG/M2 | SYSTOLIC BLOOD PRESSURE: 147 MMHG | HEART RATE: 68 BPM | DIASTOLIC BLOOD PRESSURE: 65 MMHG | HEIGHT: 69 IN | OXYGEN SATURATION: 100 % | TEMPERATURE: 97.8 F | WEIGHT: 143 LBS | RESPIRATION RATE: 17 BRPM

## 2017-10-03 DIAGNOSIS — L03.119 CELLULITIS OF LOWER EXTREMITY, UNSPECIFIED LATERALITY: Primary | ICD-10-CM

## 2017-10-03 DIAGNOSIS — R07.9 CHEST PAIN, UNSPECIFIED TYPE: ICD-10-CM

## 2017-10-03 DIAGNOSIS — R29.898 FINGER DYSFUNCTION: ICD-10-CM

## 2017-10-03 DIAGNOSIS — R60.0 LOCALIZED EDEMA: ICD-10-CM

## 2017-10-03 RX ORDER — FUROSEMIDE 20 MG/1
20 TABLET ORAL DAILY
Qty: 30 TAB | Refills: 1 | Status: SHIPPED | OUTPATIENT
Start: 2017-10-03 | End: 2017-10-23 | Stop reason: SDUPTHER

## 2017-10-03 RX ORDER — CEFTRIAXONE 1 G/1
1 INJECTION, POWDER, FOR SOLUTION INTRAMUSCULAR; INTRAVENOUS ONCE
Qty: 1 VIAL | Refills: 0
Start: 2017-10-03 | End: 2017-10-03

## 2017-10-03 RX ORDER — INSULIN GLARGINE 100 [IU]/ML
INJECTION, SOLUTION SUBCUTANEOUS
Qty: 1 VIAL | Refills: 5 | COMMUNITY
Start: 2017-10-03 | End: 2018-02-08 | Stop reason: ALTCHOICE

## 2017-10-03 RX ORDER — CEPHALEXIN 500 MG/1
500 CAPSULE ORAL 3 TIMES DAILY
Qty: 30 CAP | Refills: 0 | Status: SHIPPED | OUTPATIENT
Start: 2017-10-03 | End: 2017-10-13

## 2017-10-03 NOTE — PROGRESS NOTES
HISTORY OF PRESENT ILLNESS  Carolyn Galindo is a 59 y.o. female. HPI  Chief Complaint   Patient presents with    Leg Swelling     X 2 WEEKS     States has been having stabbing pain in left chest.  States is concerned about heart. States legs started to go down and cellulitis was getting better. After fluid pills ran out, and swelling started again in legs, cellulitis got worse. Patient c/o not having oxygen machine ( portable)   Discussed with patient that [facility] [employer]ammi Asher and the agency will bring forms to office that need to be completed. Sammi Asher [facility] forms had been faxed to this  office. This Office has not received any forms. REquest ortho referral for trigger finger right hand. States is unable to stretch it out. Review of Systems   Constitutional: Negative for chills and fever. HENT: Negative. Eyes: Negative. Respiratory: Negative for cough, shortness of breath and wheezing. Cardiovascular: Positive for chest pain and leg swelling. Gastrointestinal: Negative for diarrhea, nausea and vomiting. Genitourinary: Negative. Musculoskeletal: Negative. Neurological: Negative. Physical Exam   Constitutional: She is oriented to person, place, and time. She appears well-developed and well-nourished. No distress. HENT:   Head: Normocephalic and atraumatic. Eyes: Conjunctivae are normal.   Neck: Normal range of motion. Neck supple. Cardiovascular: Normal rate, regular rhythm, normal heart sounds and intact distal pulses. Exam reveals no gallop and no friction rub. No murmur heard. EKG  With non-specfic ST changes  Reviewed with Dr. Tl Agarwal. Recommendation to patient - stress test.    Positive for 2+ edema B legs. Pulmonary/Chest: Effort normal and breath sounds normal. No respiratory distress. She has no wheezes. She has no rales. She exhibits no tenderness. Abdominal: She exhibits no distension. Musculoskeletal: Normal range of motion. Neurological: She is alert and oriented to person, place, and time. Skin: Skin is warm and dry. She is not diaphoretic. There is erythema. Positive for increased warmth, erythematous rash B legs    Nursing note and vitals reviewed. Plan of care and AVS reviewed with patient who verbalized understanding. ASSESSMENT and PLAN    ICD-10-CM ICD-9-CM    1. Cellulitis of lower extremity, unspecified laterality L03.119 682.6 cefTRIAXone (ROCEPHIN) 1 gram injection      CEFTRIAXONE SODIUM INJECTION  MG      TN THER/PROPH/DIAG INJECTION, SUBCUT/IM      cephALEXin (KEFLEX) 500 mg capsule    B lower extremities   2. Chest pain, unspecified type R07.9 786.50 AMB POC EKG ROUTINE W/ 12 LEADS, INTER & REP   3. Localized edema R60.0 782.3 furosemide (LASIX) 20 mg tablet   4. Finger dysfunction R29.898 729.89 REFERRAL TO ORTHOPEDICS    right hand 4th and 5th finger/ won't straighten out   5. Uncontrolled type 2 diabetes mellitus with diabetic nephropathy, with long-term current use of insulin (HCC) E11.21 250.42 insulin glargine (LANTUS) 100 unit/mL injection    E11.65 583.81     Z79.4 V58.67    Patient to make appointment with ortho. Patient states would like to schedule for stress test after  Legs have gotten better.   Nursing to assist patient with getting oxygen tank  F/U 2 weeks cellulitis

## 2017-10-03 NOTE — TELEPHONE ENCOUNTER
Chief Complaint   Patient presents with    Other    New Order     PORTABLE OXYGEN TANKS     Rep will fax the plan of treatment form for the portable oxygen concentrator to the office. Rep informed me that Petr Richards will bring the plan of treatment down to the office, since the fax machine at their location is not working.

## 2017-10-03 NOTE — MR AVS SNAPSHOT
Visit Information Date & Time Provider Department Dept. Phone Encounter #  
 10/3/2017  3:00 PM Vince Jordan NP [address] [facility] [phone] Follow-up Instructions Return in about 2 weeks (around 10/17/2017) for cellulitis. Your Appointments 12/14/2017 10:30 AM  
PHYSICAL PRE OP with Vince Jordan NP Aline Emmanuel (LULU SCHEDULING) Appt Note: well woman exam 9/14/17[facility]  
 [address] [place] [facility] [address]  
[phone]  
  
   
 [address] Upcoming Health Maintenance Date Due  
 BREAST CANCER SCRN MAMMOGRAM 12/30/2017* EYE EXAM RETINAL OR DILATED Q1 11/18/2017 HEMOGLOBIN A1C Q6M 2/4/2018 LIPID PANEL Q1 7/10/2018 MICROALBUMIN Q1 7/19/2018 FOOT EXAM Q1 8/14/2018 PAP AKA CERVICAL CYTOLOGY 9/14/2020 COLONOSCOPY 6/21/2022 DTaP/Tdap/Td series (2 - Td) 5/12/2027 *Topic was postponed. The date shown is not the original due date. Allergies as of 10/3/2017  Review Complete On: 10/3/2017 By: Vince Jordan NP No Known Allergies Current Immunizations  Reviewed on 9/14/2017 Name Date Influenza Vaccine 10/28/2016 12:00 AM, 1/29/2015 12:00 AM, 1/16/2014 12:00 AM, 11/12/2012 12:00 AM, 10/18/2012 12:00 AM, 11/17/2009 12:00 AM  
 Influenza Vaccine (Quad) PF 9/14/2017, 10/6/2015 12:00 AM  
 Pneumococcal Polysaccharide (PPSV-23) 10/18/2012 12:00 AM  
 Zoster Vaccine, Live 11/12/2012 12:00 AM  
  
 Not reviewed this visit You Were Diagnosed With   
  
 Codes Comments Cellulitis of lower extremity, unspecified laterality    -  Primary ICD-10-CM: L03.119 ICD-9-CM: 682.6 B lower extremities Chest pain, unspecified type     ICD-10-CM: R07.9 ICD-9-CM: 786.50 Localized edema     ICD-10-CM: R60.0 ICD-9-CM: 182. 3 Finger dysfunction     ICD-10-CM: R29.898 ICD-9-CM: 729.89 right hand 4th and 5th finger/ won't straighten out Uncontrolled type 2 diabetes mellitus with diabetic nephropathy, with long-term current use of insulin (HCC)     ICD-10-CM: E11.21, E11.65, Z79.4 ICD-9-CM: 250.42, 583.81, V58.67 Vitals BP Pulse Temp Resp Height(growth percentile) 147/65 (BP 1 Location: Left arm, BP Patient Position: Sitting) 68 97.8 °F (36.6 °C) (Temporal) 17 5' 9\" (1.753 m) Weight(growth percentile) SpO2 BMI OB Status Smoking Status 143 lb (64.9 kg) 100% 21.12 kg/m2 Postmenopausal Former Smoker Vitals History BMI and BSA Data Body Mass Index Body Surface Area  
 21.12 kg/m 2 1.78 m 2 Preferred Pharmacy Pharmacy Name Phone [facility], [address] [phone] Your Updated Medication List  
  
   
This list is accurate as of: 10/3/17  3:56 PM.  Always use your most recent med list.  
  
  
  
  
 albuterol-ipratropium 2.5 mg-0.5 mg/3 ml Nebu Commonly known as:  DUO-NEB  
3 mL by Nebulization route every four (4) hours as needed. aspirin delayed-release 81 mg tablet Take 1 Tab by mouth daily. * atorvastatin 20 mg tablet Commonly known as:  LIPITOR Take 1 Tab by mouth daily. * atorvastatin 10 mg tablet Commonly known as:  LIPITOR  
  
 BD INSULIN SYRINGE ULTRA-FINE 1 mL 31 gauge x 5/16 Syrg Generic drug:  Insulin Syringe-Needle U-100  
  
 busPIRone 7.5 mg tablet Commonly known as:  BUSPAR Take 7.5 mg by mouth two (2) times a day. cefTRIAXone 1 gram injection Commonly known as:  ROCEPHIN  
1 g by IntraMUSCular route once for 1 dose. cephALEXin 500 mg capsule Commonly known as:  Tawana Ly Take 1 Cap by mouth three (3) times daily for 10 days. furosemide 20 mg tablet Commonly known as:  LASIX Take 1 Tab by mouth daily. insulin NPH/insulin regular 100 unit/mL (70-30) injection Commonly known as:  NOVOLIN 70/30, HUMULIN 70/30 Take 20 uints in the AM and 20 uints in the PM. LANTUS 100 unit/mL injection Generic drug:  insulin glargine Take 25 units daily  
  
 levothyroxine 150 mcg tablet Commonly known as:  SYNTHROID Take 1 Tab by mouth daily. lisinopril 20 mg tablet Commonly known as:  [place] Take 1 Tab by mouth daily. One Touch Delica 33 gauge Misc Generic drug:  lancets  
use as directed by prescriber four times a day with meals and at bedtime ONETOUCH ULTRA TEST strip Generic drug:  glucose blood VI test strips  
use as directed by prescriber four times a day with meals and at bedtime Oxygen Indications: 3 mL OXYGEN-AIR DELIVERY SYSTEMS  
by Does Not Apply route. Indications: [facility]  
  
 pregabalin 100 mg capsule Commonly known as:  Estrella Kennedyer Take 1 Cap by mouth two (2) times a day. Max Daily Amount: 200 mg.  
  
 sertraline 100 mg tablet Commonly known as:  ZOLOFT Take 0.5 Tabs by mouth daily. SUBOXONE 8-2 mg Film sublingaul film Generic drug:  buprenorphine-naloxone  
dissolve 2 and 1/2 (TWO AND A HALF) FILMS under the tongue daily * Notice: This list has 2 medication(s) that are the same as other medications prescribed for you. Read the directions carefully, and ask your doctor or other care provider to review them with you. Prescriptions Sent to Pharmacy Refills  
 furosemide (LASIX) 20 mg tablet 1 Sig: Take 1 Tab by mouth daily. Class: Normal  
 Pharmacy: [address], [address] [facility] Ph #: [phone] Route: Oral  
 cephALEXin (KEFLEX) 500 mg capsule 0 Sig: Take 1 Cap by mouth three (3) times daily for 10 days. Class: Normal  
 Pharmacy: [address], [address] [facility] Ph #: [phone] Route: Oral  
  
We Performed the Following AMB POC EKG ROUTINE W/ 12 LEADS, INTER & REP [50722 CPT(R)] CEFTRIAXONE SODIUM INJECTION  MG [ [facility]] Comments:  
 Mix per protocol OK THER/PROPH/DIAG INJECTION, SUBCUT/IM [id number] CPT(R)] REFERRAL TO ORTHOPEDICS [WYW422 Custom] Comments:  
 Please evaluate and treat patient for  4th and 5th finger not  Straightening out Follow-up Instructions Return in about 2 weeks (around 10/17/2017) for cellulitis. Referral Information Referral ID Referred By Referred To  
  
 [id number] [place], [address] MD   
   [address] Phone: [phone] Fax: [phone] Visits Status Start Date End Date 1 New Request 10/3/17 10/3/18 If your referral has a status of pending review or denied, additional information will be sent to support the outcome of this decision. Introducing [facility] & HEALTH SERVICES! [facility] introduces UserTesting patient portal. Now you can access parts of your medical record, email your doctor's office, and request medication refills online. 1. In your internet browser, go to [email or web address] 2. Click on the First Time User? Click Here link in the Sign In box. You will see the New Member Sign Up page. 3. Enter your UserTesting Access Code exactly as it appears below. You will not need to use this code after youve completed the sign-up process. If you do not sign up before the expiration date, you must request a new code. · UserTesting Access Code: [email or web address] Expires: 11/8/2017  9:56 PM 
 
4. Enter the last four digits of your Social Security Number (xxxx) and Date of Birth (mm/dd/yyyy) as indicated and click Submit. You will be taken to the next sign-up page. 5. Create a UserTesting ID. This will be your UserTesting login ID and cannot be changed, so think of one that is secure and easy to remember. 6. Create a Clew password. You can change your password at any time. 7. Enter your Password Reset Question and Answer. This can be used at a later time if you forget your password. 8. Enter your e-mail address. You will receive e-mail notification when new information is available in [address] 9. Click Sign Up. You can now view and download portions of your medical record. 10. Click the Download Summary menu link to download a portable copy of your medical information. If you have questions, please visit the Frequently Asked Questions section of the Clew website. Remember, Clew is NOT to be used for urgent needs. For medical emergencies, dial 911. Now available from your iPhone and Android! Please provide this summary of care documentation to your next provider. Your primary care clinician is listed as Michel Salazar. If you have any questions after today's visit, please call [phone].

## 2017-10-09 ENCOUNTER — TELEPHONE (OUTPATIENT)
Dept: INTERNAL MEDICINE CLINIC | Age: 62
End: 2017-10-09

## 2017-10-09 NOTE — TELEPHONE ENCOUNTER
Patient called in reference to wanting to speak with someone in reference to [address]. Please call her at [phone].

## 2017-10-09 NOTE — TELEPHONE ENCOUNTER
Chief Complaint   Patient presents with    Other    New Order     PORTABLE OXYGEN TANKS     Patient is inquiring about the status of her portable oxygen concentrator.

## 2017-10-09 NOTE — TELEPHONE ENCOUNTER
Already noted in different note. Spoke with patient.   Signed By: Khoi Steele LPN     October 9, 6253

## 2017-10-17 ENCOUNTER — OFFICE VISIT (OUTPATIENT)
Dept: INTERNAL MEDICINE CLINIC | Age: 62
End: 2017-10-17

## 2017-10-17 VITALS
RESPIRATION RATE: 16 BRPM | HEIGHT: 69 IN | WEIGHT: 137.2 LBS | DIASTOLIC BLOOD PRESSURE: 63 MMHG | TEMPERATURE: 97.4 F | OXYGEN SATURATION: 97 % | BODY MASS INDEX: 20.32 KG/M2 | HEART RATE: 97 BPM | SYSTOLIC BLOOD PRESSURE: 130 MMHG

## 2017-10-17 DIAGNOSIS — L03.119 CELLULITIS OF LOWER EXTREMITY, UNSPECIFIED LATERALITY: Primary | ICD-10-CM

## 2017-10-17 RX ORDER — CEPHALEXIN 500 MG/1
500 CAPSULE ORAL 2 TIMES DAILY
Qty: 14 CAP | Refills: 0 | Status: SHIPPED | OUTPATIENT
Start: 2017-10-17 | End: 2017-10-24

## 2017-10-17 RX ORDER — TRIAMCINOLONE ACETONIDE 1 MG/G
OINTMENT TOPICAL 2 TIMES DAILY
Qty: 30 G | Refills: 0 | Status: SHIPPED | OUTPATIENT
Start: 2017-10-17 | End: 2019-01-08 | Stop reason: SDUPTHER

## 2017-10-17 NOTE — MR AVS SNAPSHOT
Visit Information Date & Time Provider Department Dept. Phone Encounter #  
 10/17/2017 11:00 AM Vince Jordan NP [facility] [phone] Follow-up Instructions Return in about 2 weeks (around 10/31/2017) for cellulitis. Your Appointments 12/14/2017 10:30 AM  
PHYSICAL PRE OP with Vince Jordan NP Aline Emmanuel (L[facility]U SCHEDULING) Appt Note: well woman exam 9/14/17[facility]  
 [address] [place] [address]  
[phone]  
  
   
 [address] Upcoming Health Maintenance Date Due  
 BREAST CANCER SCRN MAMMOGRAM 12/30/2017* EYE EXAM RETINAL OR DILATED Q1 11/18/2017 HEMOGLOBIN A1C Q6M 2/4/2018 LIPID PANEL Q1 7/10/2018 MICROALBUMIN Q1 7/19/2018 FOOT EXAM Q1 8/14/2018 PAP AKA CERVICAL CYTOLOGY 9/14/2020 COLONOSCOPY 6/21/2022 DTaP/Tdap/Td series (2 - Td) 5/12/2027 *Topic was postponed. The date shown is not the original due date. Allergies as of 10/17/2017  Review Complete On: 10/17/2017 By: Vince Jordan NP No Known Allergies Current Immunizations  Reviewed on 9/14/2017 Name Date Influenza Vaccine 10/28/2016 12:00 AM, 1/29/2015 12:00 AM, 1/16/2014 12:00 AM, 11/12/2012 12:00 AM, 10/18/2012 12:00 AM, 11/17/2009 12:00 AM  
 Influenza Vaccine (Quad) PF 9/14/2017, 10/6/2015 12:00 AM  
 Pneumococcal Polysaccharide (PPSV-23) 10/18/2012 12:00 AM  
 Zoster Vaccine, Live 11/12/2012 12:00 AM  
  
 Not reviewed this visit You Were Diagnosed With   
  
 Codes Comments Cellulitis of lower extremity, unspecified laterality    -  Primary ICD-10-CM: L03.119 ICD-9-CM: 723. 6 Vitals BP Pulse Temp Resp Height(growth percentile) 130/63 (BP 1 Location: Left arm, BP Patient Position: Sitting) 97 97.4 °F (36.3 °C) (Temporal) 16 5' 9\" (1.753 m) Weight(growth percentile) SpO2 BMI OB Status Smoking Status 137 lb 3.2 oz (62.2 kg) 97% 20.26 kg/m2 Postmenopausal Former Smoker Vitals History BMI and BSA Data Body Mass Index Body Surface Area  
 20.26 kg/m 2 1.74 m 2 Preferred Pharmacy Pharmacy Name Phone [facility] [phone] Your Updated Medication List  
  
   
This list is accurate as of: 10/17/17 11:18 AM.  Always use your most recent med list.  
  
  
  
  
 albuterol-ipratropium 2.5 mg-0.5 mg/3 ml Nebu Commonly known as:  DUO-NEB  
3 mL by Nebulization route every four (4) hours as needed. aspirin delayed-release 81 mg tablet Take 1 Tab by mouth daily. * atorvastatin 20 mg tablet Commonly known as:  LIPITOR Take 1 Tab by mouth daily. * atorvastatin 10 mg tablet Commonly known as:  LIPITOR  
  
 BD INSULIN SYRINGE ULTRA-FINE 1 mL 31 gauge x 5/16 Syrg Generic drug:  Insulin Syringe-Needle U-100  
  
 busPIRone 7.5 mg tablet Commonly known as:  BUSPAR Take 7.5 mg by mouth two (2) times a day. cephALEXin 500 mg capsule Commonly known as:  Sam Latosha Take 1 Cap by mouth two (2) times a day for 7 days. furosemide 20 mg tablet Commonly known as:  LASIX Take 1 Tab by mouth daily. insulin NPH/insulin regular 100 unit/mL (70-30) injection Commonly known as:  NOVOLIN 70/30, HUMULIN 70/30 Take 20 uints in the AM and 20 uints in the PM. LANTUS 100 unit/mL injection Generic drug:  insulin glargine Take 25 units daily  
  
 levothyroxine 150 mcg tablet Commonly known as:  SYNTHROID Take 1 Tab by mouth daily. lisinopril 20 mg tablet Commonly known as:  Bc Shutters Take 1 Tab by mouth daily. One Touch Delica 33 gauge Misc Generic drug:  lancets  
use as directed by prescriber four times a day with meals and at bedtime ONETOUCH ULTRA TEST strip Generic drug:  glucose blood VI test strips use as directed by prescriber four times a day with meals and at bedtime OXYGEN-AIR DELIVERY SYSTEMS  
by Does Not Apply route. Indications: [facility]  
  
 pregabalin 100 mg capsule Commonly known as:  Reuben [place] Take 1 Cap by mouth two (2) times a day. Max Daily Amount: 200 mg.  
  
 sertraline 100 mg tablet Commonly known as:  ZOLOFT Take 0.5 Tabs by mouth daily. SUBOXONE 8-2 mg Film sublingaul film Generic drug:  buprenorphine-naloxone  
dissolve 2 and 1/2 (TWO AND A HALF) FILMS under the tongue daily  
  
 triamcinolone acetonide 0.1 % ointment Commonly known as:  KENALOG Apply  to affected area two (2) times a day. use thin layer * Notice: This list has 2 medication(s) that are the same as other medications prescribed for you. Read the directions carefully, and ask your doctor or other care provider to review them with you. Prescriptions Sent to Pharmacy Refills  
 cephALEXin (KEFLEX) 500 mg capsule 0 Sig: Take 1 Cap by mouth two (2) times a day for 7 days. Class: Normal  
 Pharmacy: [place] [address] [facility] Ph #: [phone] Route: Oral  
 triamcinolone acetonide (KENALOG) 0.1 % ointment 0 Sig: Apply  to affected area two (2) times a day. use thin layer Class: Normal  
 Pharmacy: [place] [address] [facility] Ph #: [phone] Route: Topical  
  
Follow-up Instructions Return in about 2 weeks (around 10/31/2017) for cellulitis. Introducing [facility] & HEALTH SERVICES! Corina Horta introduces Sooligan patient portal. Now you can access parts of your medical record, email your doctor's office, and request medication refills online. 1. In your internet browser, go to [email or web address] 2. Click on the First Time User? Click Here link in the Sign In box. You will see the New Member Sign Up page. 3. Enter your Shoptimise Access Code exactly as it appears below. You will not need to use this code after youve completed the sign-up process. If you do not sign up before the expiration date, you must request a new code. · Shoptimise Access Code: [email or web address] Expires: 11/8/2017  9:56 PM 
 
4. Enter the last four digits of your Social Security Number (xxxx) and Date of Birth (mm/dd/yyyy) as indicated and click Submit. You will be taken to the next sign-up page. 5. Create a [employer] ID. This will be your Shoptimise login ID and cannot be changed, so think of one that is secure and easy to remember. 6. Create a Shoptimise password. You can change your password at any time. 7. Enter your Password Reset Question and Answer. This can be used at a later time if you forget your password. 8. Enter your e-mail address. You will receive e-mail notification when new information is available in [address] 9. Click Sign Up. You can now view and download portions of your medical record. 10. Click the Download Summary menu link to download a portable copy of your medical information. If you have questions, please visit the Frequently Asked Questions section of the Shoptimise website. Remember, Shoptimise is NOT to be used for urgent needs. For medical emergencies, dial 911. Now available from your iPhone and Android! Please provide this summary of care documentation to your next provider. Your primary care clinician is listed as Evan Perez. If you have any questions after today's visit, please call [phone].

## 2017-10-17 NOTE — TELEPHONE ENCOUNTER
Paperwork was faxed today to Nina Yancey for oxygen tanks per [facility] Parikh, [facility]  60/08/5402  3:84 PM

## 2017-10-17 NOTE — PROGRESS NOTES
Chief Complaint   Patient presents with    Leg Swelling     FOLLOW UP     1. Have you been to the ER, urgent care clinic since your last visit? Hospitalized since your last visit? No    2. Have you seen or consulted any other health care providers outside of the [address] [place] [address] since your last visit? Include any pap smears or colon screening. No     There are no preventive care reminders to display for this patient. Do you have an [address] in place in the event that you have a healthcare crisis that could impact your decision making as it pertains to your health? NO    Would you like information about Advance Care Planning? NO    Information given.  NO

## 2017-10-17 NOTE — TELEPHONE ENCOUNTER
Chief Complaint   Patient presents with    Other    New Order     PORTABLE OXYGEN TANKS     Shannon Pedro informed me that Sultana De Santiago will be calling the office within the next 10 minutes to discuss papers that he will be bringing to the office. Spoke with Sultana De Santiago after waiting and he informed me that he will fax the papers to the office that needs to be signed. Papers received. Faxed certificate of medical necessity to [phone] and was received.

## 2017-10-25 ENCOUNTER — TELEPHONE (OUTPATIENT)
Dept: INTERNAL MEDICINE CLINIC | Age: 62
End: 2017-10-25

## 2017-11-03 DIAGNOSIS — I10 ESSENTIAL HYPERTENSION: ICD-10-CM

## 2017-11-03 DIAGNOSIS — F32.A DEPRESSION, UNSPECIFIED DEPRESSION TYPE: ICD-10-CM

## 2017-11-03 RX ORDER — BUSPIRONE HYDROCHLORIDE 7.5 MG/1
7.5 TABLET ORAL 2 TIMES DAILY
Qty: 180 TAB | Refills: 1 | Status: SHIPPED | OUTPATIENT
Start: 2017-11-03 | End: 2018-04-27 | Stop reason: SDUPTHER

## 2017-11-03 RX ORDER — LISINOPRIL 20 MG/1
20 TABLET ORAL DAILY
Qty: 90 TAB | Refills: 1 | Status: SHIPPED | OUTPATIENT
Start: 2017-11-03 | End: 2018-04-27 | Stop reason: SDUPTHER

## 2017-11-03 NOTE — TELEPHONE ENCOUNTER
Requested Prescriptions     Pending Prescriptions Disp Refills    busPIRone (BUSPAR) 7.5 mg tablet       Sig: Take 1 Tab by mouth two (2) times a day.  lisinopril (PRINIVIL, ZESTRIL) 20 mg tablet 30 Tab 5     Sig: Take 1 Tab by mouth daily.      Pt also stated that she wanted to let dr long know that her ankles and feet are doing much better and that they have gone down

## 2017-11-03 NOTE — TELEPHONE ENCOUNTER
Requested Prescriptions     Pending Prescriptions Disp Refills    busPIRone (BUSPAR) 7.5 mg tablet 180 Tab 1     Sig: Take 1 Tab by mouth two (2) times a day.  lisinopril (PRINIVIL, ZESTRIL) 20 mg tablet 90 Tab 1     Sig: Take 1 Tab by mouth daily.      Last office visit 10/7/17  Future 11/7/17 or 12/14/17  Last filled 12/12/16 - 12/12/16  Changes made to medication on last visit none

## 2017-11-21 ENCOUNTER — OFFICE VISIT (OUTPATIENT)
Dept: INTERNAL MEDICINE CLINIC | Age: 62
End: 2017-11-21

## 2017-11-21 VITALS
HEART RATE: 98 BPM | HEIGHT: 69 IN | OXYGEN SATURATION: 98 % | BODY MASS INDEX: 20.26 KG/M2 | TEMPERATURE: 97.7 F | SYSTOLIC BLOOD PRESSURE: 132 MMHG | DIASTOLIC BLOOD PRESSURE: 67 MMHG | RESPIRATION RATE: 17 BRPM | WEIGHT: 136.8 LBS

## 2017-11-21 DIAGNOSIS — R20.8 BURNING SENSATION OF FEET: Primary | ICD-10-CM

## 2017-11-21 DIAGNOSIS — R60.0 LOCALIZED EDEMA: ICD-10-CM

## 2017-11-21 LAB — HBA1C MFR BLD HPLC: 8.5 %

## 2017-11-21 RX ORDER — PREGABALIN 100 MG/1
100 CAPSULE ORAL 3 TIMES DAILY
Qty: 60 CAP | Refills: 5 | Status: SHIPPED | OUTPATIENT
Start: 2017-11-21 | End: 2017-11-26 | Stop reason: ALTCHOICE

## 2017-11-21 RX ORDER — LEVOTHYROXINE SODIUM 125 UG/1
TABLET ORAL
Refills: 0 | COMMUNITY
Start: 2017-09-11 | End: 2018-05-03 | Stop reason: ALTCHOICE

## 2017-11-21 RX ORDER — PREGABALIN 100 MG/1
100 CAPSULE ORAL 3 TIMES DAILY
Qty: 90 CAP | Refills: 5 | Status: SHIPPED | OUTPATIENT
Start: 2017-11-21 | End: 2018-04-27 | Stop reason: SDUPTHER

## 2017-11-21 NOTE — MR AVS SNAPSHOT
Visit Information Date & Time Provider Department Dept. Phone Encounter #  
 11/21/2017 11:00 AM Wendy Grayson NP [facility] [phone] Follow-up Instructions Return in about 1 month (around 12/21/2017) for edema. Your Appointments 12/14/2017 10:30 AM  
PHYSICAL PRE OP with HUY Paceget Emmanuel (LULU SCHEDULING) Appt Note: well woman exam  
 [address] [facility] [address]  
[phone]  
  
   
 [address] Upcoming Health Maintenance Date Due  
 BREAST CANCER SCRN MAMMOGRAM 12/30/2017* EYE EXAM RETINAL OR DILATED Q1 12/30/2017* HEMOGLOBIN A1C Q6M 2/4/2018 LIPID PANEL Q1 7/10/2018 MICROALBUMIN Q1 7/19/2018 FOOT EXAM Q1 8/14/2018 PAP AKA CERVICAL CYTOLOGY 9/14/2020 COLONOSCOPY 6/21/2022 DTaP/Tdap/Td series (2 - Td) 5/12/2027 *Topic was postponed. The date shown is not the original due date. Allergies as of 11/21/2017  Review Complete On: 11/21/2017 By: Wendy Grayson NP No Known Allergies Current Immunizations  Reviewed on 9/14/2017 Name Date Influenza Vaccine 10/28/2016 12:00 AM, 1/29/2015 12:00 AM, 1/16/2014 12:00 AM, 11/12/2012 12:00 AM, 10/18/2012 12:00 AM, 11/17/2009 12:00 AM  
 Influenza Vaccine (Quad) PF 9/14/2017, 10/6/2015 12:00 AM  
 Pneumococcal Polysaccharide (PPSV-23) 10/18/2012 12:00 AM  
 Zoster Vaccine, Live 11/12/2012 12:00 AM  
  
 Not reviewed this visit You Were Diagnosed With   
  
 Codes Comments Burning sensation of feet    -  Primary ICD-10-CM: R20.8 ICD-9-CM: 782.0 Uncontrolled type 2 diabetes mellitus with diabetic nephropathy, with long-term current use of insulin (HCC)     ICD-10-CM: E11.21, E11.65, Z79.4 ICD-9-CM: 250.42, 583.81, V58.67 Vitals BP Pulse Temp Resp Height(growth percentile) 132/67 (BP 1 Location: Left arm, BP Patient Position: Sitting) 98 97.7 °F (36.5 °C) (Temporal) 17 5' 9\" (1.753 m) Weight(growth percentile) SpO2 BMI OB Status Smoking Status 136 lb 12.8 oz (62.1 kg) 98% 20.2 kg/m2 Postmenopausal Former Smoker Vitals History BMI and BSA Data Body Mass Index Body Surface Area  
 20.2 kg/m 2 1.74 m 2 Preferred Pharmacy Pharmacy Name Phone Blanca Kerns, [address] [facility] [phone] Your Updated Medication List  
  
   
This list is accurate as of: 11/21/17  2:29 PM.  Always use your most recent med list.  
  
  
  
  
 albuterol-ipratropium 2.5 mg-0.5 mg/3 ml Nebu Commonly known as:  DUO-NEB  
3 mL by Nebulization route every four (4) hours as needed. aspirin delayed-release 81 mg tablet Take 1 Tab by mouth daily. * atorvastatin 20 mg tablet Commonly known as:  LIPITOR Take 1 Tab by mouth daily. * atorvastatin 10 mg tablet Commonly known as:  LIPITOR  
  
 BD INSULIN SYRINGE ULTRA-FINE 1 mL 31 gauge x 5/16 Syrg Generic drug:  Insulin Syringe-Needle U-100  
  
 busPIRone 7.5 mg tablet Commonly known as:  BUSPAR Take 1 Tab by mouth two (2) times a day. furosemide 20 mg tablet Commonly known as:  LASIX Take 1 Tab by mouth daily. insulin NPH/insulin regular 100 unit/mL (70-30) injection Commonly known as:  NOVOLIN 70/30, HUMULIN 70/30 Take 20 uints in the AM and 20 uints in the PM. LANTUS 100 unit/mL injection Generic drug:  insulin glargine Take 25 units daily * levothyroxine 125 mcg tablet Commonly known as:  SYNTHROID * levothyroxine 150 mcg tablet Commonly known as:  SYNTHROID Take 1 Tab by mouth daily. lisinopril 20 mg tablet Commonly known as:  Donnald Code Take 1 Tab by mouth daily. One Touch Delica 33 gauge Misc Generic drug:  lancets use as directed by prescriber four times a day with meals and at bedtime ONETOUCH ULTRA TEST strip Generic drug:  glucose blood VI test strips  
use as directed by prescriber four times a day with meals and at bedtime OXYGEN-AIR DELIVERY SYSTEMS  
by Does Not Apply route. Indications: [address] * pregabalin 100 mg capsule Commonly known as:  Deneise Grandchild Take 1 Cap by mouth three (3) times daily. Max Daily Amount: 300 mg.  
  
 * pregabalin 100 mg capsule Commonly known as:  Deneise Grandchild Take 1 Cap by mouth three (3) times daily. Max Daily Amount: 300 mg.  
  
 sertraline 100 mg tablet Commonly known as:  ZOLOFT Take 0.5 Tabs by mouth daily. SUBOXONE 8-2 mg Film sublingaul film Generic drug:  buprenorphine-naloxone  
dissolve 2 and 1/2 (TWO AND A HALF) FILMS under the tongue daily  
  
 triamcinolone acetonide 0.1 % ointment Commonly known as:  KENALOG Apply  to affected area two (2) times a day. use thin layer * Notice: This list has 6 medication(s) that are the same as other medications prescribed for you. Read the directions carefully, and ask your doctor or other care provider to review them with you. Prescriptions Printed Refills  
 pregabalin (LYRICA) 100 mg capsule 5 Sig: Take 1 Cap by mouth three (3) times daily. Max Daily Amount: 300 mg. Class: Print Route: Oral  
 pregabalin (LYRICA) 100 mg capsule 5 Sig: Take 1 Cap by mouth three (3) times daily. Max Daily Amount: 300 mg. Class: Print Route: Oral  
  
We Performed the Following AMB POC HEMOGLOBIN A1C [68622 CPT(R)] Follow-up Instructions Return in about 1 month (around 12/21/2017) for edema. Patient Instructions TAke 40 mg x 1 week for edema  Then return to 20 mg daily. Introducing [facility]!    
 Ladonna Velarde introduces [employer] patient portal. Now you can access parts of your medical record, email your doctor's office, and request medication refills online. 1. In your internet browser, go to [email or web address] 2. Click on the First Time User? Click Here link in the Sign In box. You will see the New Member Sign Up page. 3. Enter your Joome Access Code exactly as it appears below. You will not need to use this code after youve completed the sign-up process. If you do not sign up before the expiration date, you must request a new code. · Joome Access Code: [email or web address] Expires: 2/19/2018 10:55 AM 
 
4. Enter the last four digits of your Social Security Number (xxxx) and Date of Birth (mm/dd/yyyy) as indicated and click Submit. You will be taken to the next sign-up page. 5. Create a Joome ID. This will be your Joome login ID and cannot be changed, so think of one that is secure and easy to remember. 6. Create a Joome password. You can change your password at any time. 7. Enter your Password Reset Question and Answer. This can be used at a later time if you forget your password. 8. Enter your e-mail address. You will receive e-mail notification when new information is available in [address] 9. Click Sign Up. You can now view and download portions of your medical record. 10. Click the Download Summary menu link to download a portable copy of your medical information. If you have questions, please visit the Frequently Asked Questions section of the Joome website. Remember, Joome is NOT to be used for urgent needs. For medical emergencies, dial 911. Now available from your iPhone and Android! Please provide this summary of care documentation to your next provider. Your primary care clinician is listed as Gerhardt Dupont. If you have any questions after today's visit, please call [phone].

## 2017-11-21 NOTE — PROGRESS NOTES
Chief Complaint   Patient presents with    Foot Swelling     BILATERAL; UNABLE TO WALK AND STAND; PAIN    Breathing Problem     REQUEST INCREASE IN O2, SHE STATES THAT SHE DOES NOT FEEL AS IF SHE IS NOT GETTING ENOUGH AIR CURRENTLY AT 3 L/MIN     1. Have you been to the ER, urgent care clinic since your last visit? Hospitalized since your last visit? No    2. Have you seen or consulted any other health care providers outside of the [address] [place] [address] since your last visit? Include any pap smears or colon screening. No     There are no preventive care reminders to display for this patient. Do you have an [address] in place in the event that you have a healthcare crisis that could impact your decision making as it pertains to your health? NO    Would you like information about Advance Care Planning? NO    Information given.  NO

## 2017-11-26 NOTE — PROGRESS NOTES
HISTORY OF PRESENT ILLNESS  Maame Ta is a 57 y.o. female. HPI  Chief Complaint   Patient presents with    Foot Swelling     BILATERAL; UNABLE TO WALK AND STAND; PAIN    Breathing Problem     REQUEST INCREASE IN O2, SHE STATES THAT SHE DOES NOT FEEL AS IF SHE IS NOT GETTING ENOUGH AIR CURRENTLY AT 3 L/MIN     States cellulitis did resolve.  is having swelling in feet and burning. Patient is daibetic and A1c today  8.5. Admits to taking medication or diabetes but do skip some days.  is not taking fluid pill. Has lasix 20 mg prescribed daily      Review of Systems   Constitutional: Negative for chills, fever, malaise/fatigue and weight loss. HENT: Negative. Negative for ear pain. Eyes: Negative. Respiratory: Negative. Negative for cough. Cardiovascular: Positive for leg swelling. Negative for chest pain. Gastrointestinal: Negative for abdominal pain, blood in stool, nausea and vomiting. Genitourinary: Negative. Skin: Negative. Neurological:        C/O burning in feet. Physical Exam   Constitutional: She is oriented to person, place, and time. She appears well-developed and well-nourished. No distress. HENT:   Head: Normocephalic and atraumatic. Eyes: Conjunctivae are normal.   Neck: Normal range of motion. Neck supple. Cardiovascular: Normal rate, regular rhythm and intact distal pulses. Exam reveals no gallop and no friction rub. No murmur heard. Pulmonary/Chest: Effort normal. She has no wheezes. She has no rales. Abdominal: Soft. She exhibits no distension. There is no tenderness. There is no rebound and no guarding. Musculoskeletal: Normal range of motion. She exhibits no edema, tenderness or deformity. Neurological: She is alert and oriented to person, place, and time. Skin: Skin is warm and dry. She is not diaphoretic. Cellulitis resolved   Psychiatric: She has a normal mood and affect. Nursing note and vitals reviewed.     Plan of care and AVS reviewed with patient who verbalized understanding. ASSESSMENT and PLAN    ICD-10-CM ICD-9-CM    1. Burning sensation of feet R20.8 782.0 pregabalin (LYRICA) 100 mg capsule   2. Uncontrolled type 2 diabetes mellitus with diabetic nephropathy, with long-term current use of insulin (HCC) E11.21 250.42 AMB POC HEMOGLOBIN A1C    E11.65 583.81 DISCONTINUED: pregabalin (LYRICA) 100 mg capsule    Z79.4 V58.67    Lyrica increased to 100 mg TID  Encouraged to take DM meds as prescribed and to decrease foods high in carbohydrates. Encouraged to increase lasix x 1 week then return to 20 mg daily. F/i 1 month.   F/U 1 month edema

## 2017-11-28 ENCOUNTER — TELEPHONE (OUTPATIENT)
Dept: INTERNAL MEDICINE CLINIC | Age: 62
End: 2017-11-28

## 2017-11-28 DIAGNOSIS — J44.9 CHRONIC OBSTRUCTIVE PULMONARY DISEASE, UNSPECIFIED COPD TYPE (HCC): Primary | ICD-10-CM

## 2017-11-28 NOTE — TELEPHONE ENCOUNTER
----- Message from Lien Whitney sent at 11/28/2017  7:19 AM EST -----  Regarding: NP Montesinos/Telephone  Pt states Esthela advised they received paperwork to set up an oxygen system that weighs 16 lbs, which is impossible for her to transport. She is requesting corrected paperwork to process a backpack oxygenator instead. Pt also needs the number for Dr. Doris Kern in [address] [place] [address] for trigger finger. Best contact number [phone].

## 2017-11-28 NOTE — TELEPHONE ENCOUNTER
Chief Complaint   Patient presents with    New Order     10 LB [address] [facility] [employer]     Patient called into the office to request Dr. Anam Montiel phone number which was given to her and to state that the supply order faxed to Merna Klinefelter is not correct. She states that according to Nasim Barbosa that she will be receiving a 16 lb tank. Spoke with Shady Briseno and she informed me which tank needs to be on the supply order form.

## 2017-11-30 ENCOUNTER — OFFICE VISIT (OUTPATIENT)
Dept: INTERNAL MEDICINE CLINIC | Age: 62
End: 2017-11-30

## 2017-11-30 VITALS
WEIGHT: 138 LBS | SYSTOLIC BLOOD PRESSURE: 120 MMHG | HEART RATE: 102 BPM | RESPIRATION RATE: 17 BRPM | HEIGHT: 69 IN | TEMPERATURE: 98.5 F | BODY MASS INDEX: 20.44 KG/M2 | OXYGEN SATURATION: 100 % | DIASTOLIC BLOOD PRESSURE: 64 MMHG

## 2017-11-30 DIAGNOSIS — N30.01 ACUTE CYSTITIS WITH HEMATURIA: Primary | ICD-10-CM

## 2017-11-30 LAB
BILIRUB UR QL STRIP: NORMAL
GLUCOSE UR-MCNC: NORMAL MG/DL
KETONES P FAST UR STRIP-MCNC: NORMAL MG/DL
PH UR STRIP: 5 [PH] (ref 4.6–8)
PROT UR QL STRIP: POSITIVE
SP GR UR STRIP: 1.01 (ref 1–1.03)
UA UROBILINOGEN AMB POC: NORMAL (ref 0.2–1)
URINALYSIS CLARITY POC: CLEAR
URINALYSIS COLOR POC: NORMAL
URINE BLOOD POC: NORMAL
URINE LEUKOCYTES POC: NORMAL
URINE NITRITES POC: POSITIVE

## 2017-11-30 RX ORDER — PHENAZOPYRIDINE HYDROCHLORIDE 200 MG/1
200 TABLET, FILM COATED ORAL
Qty: 9 TAB | Refills: 0 | Status: SHIPPED | OUTPATIENT
Start: 2017-11-30 | End: 2017-12-03

## 2017-11-30 RX ORDER — CIPROFLOXACIN 500 MG/1
500 TABLET ORAL 2 TIMES DAILY
Qty: 14 TAB | Refills: 0 | Status: SHIPPED | OUTPATIENT
Start: 2017-11-30 | End: 2017-12-07

## 2017-11-30 NOTE — MR AVS SNAPSHOT
Visit Information Date & Time Provider Department Dept. Phone Encounter #  
 11/30/2017  3:00 PM Ang Stewart NP [facility] [id number] Your Appointments 12/14/2017 10:30 AM  
PHYSICAL PRE OP with Ang Stewart NP Aline Emmanuel (LULU SCHEDULING) Appt Note: well woman exam  
 [address] [place] [address]  
[phone]  
  
   
 [address] [facility] [address] Upcoming Health Maintenance Date Due  
 BREAST CANCER SCRN MAMMOGRAM 12/30/2017* EYE EXAM RETINAL OR DILATED Q1 12/30/2017* HEMOGLOBIN A1C Q6M 5/21/2018 LIPID PANEL Q1 7/10/2018 MICROALBUMIN Q1 7/19/2018 FOOT EXAM Q1 8/14/2018 PAP AKA CERVICAL CYTOLOGY 9/14/2020 COLONOSCOPY 6/21/2022 DTaP/Tdap/Td series (2 - Td) 5/12/2027 *Topic was postponed. The date shown is not the original due date. Allergies as of 11/30/2017  Review Complete On: 11/30/2017 By: Ang Stewart NP No Known Allergies Current Immunizations  Reviewed on 9/14/2017 Name Date Influenza Vaccine 10/28/2016 12:00 AM, 1/29/2015 12:00 AM, 1/16/2014 12:00 AM, 11/12/2012 12:00 AM, 10/18/2012 12:00 AM, 11/17/2009 12:00 AM  
 Influenza Vaccine (Quad) PF 9/14/2017, 10/6/2015 12:00 AM  
 Pneumococcal Polysaccharide (PPSV-23) 10/18/2012 12:00 AM  
 Zoster Vaccine, Live 11/12/2012 12:00 AM  
  
 Not reviewed this visit You Were Diagnosed With   
  
 Codes Comments Acute cystitis with hematuria    -  Primary ICD-10-CM: N30.01 
ICD-9-CM: 595.0 Vitals BP Pulse Temp Resp Height(growth percentile) 120/64 (BP 1 Location: Left arm, BP Patient Position: Sitting) (!) 102 98.5 °F (36.9 °C) (Temporal) 17 5' 9\" (1.753 m) Weight(growth percentile) SpO2 BMI OB Status Smoking Status 138 lb (62.6 kg) 100% 20.38 kg/m2 Postmenopausal Former Smoker Vitals History BMI and BSA Data Body Mass Index Body Surface Area  
 20.38 kg/m 2 1.75 m 2 Preferred Pharmacy Pharm[facility] [phone] Your Updated Medication List  
  
   
This list is accurate as of: 11/30/17  3:25 PM.  Always use your most recent med list.  
  
  
  
  
 albuterol-ipratropium 2.5 mg-0.5 mg/3 ml Nebu Commonly known as:  DUO-NEB  
3 mL by Nebulization route every four (4) hours as needed. aspirin delayed-release 81 mg tablet Take 1 Tab by mouth daily. * atorvastatin 20 mg tablet Commonly known as:  LIPITOR Take 1 Tab by mouth daily. * atorvastatin 10 mg tablet Commonly known as:  LIPITOR  
  
 BD INSULIN SYRINGE ULTRA-FINE 1 mL 31 gauge x 5/16 Syrg Generic drug:  Insulin Syringe-Needle U-100  
  
 busPIRone 7.5 mg tablet Commonly known as:  BUSPAR Take 1 Tab by mouth two (2) times a day. ciprofloxacin HCl 500 mg tablet Commonly known as:  CIPRO Take 1 Tab by mouth two (2) times a day for 7 days. furosemide 20 mg tablet Commonly known as:  LASIX Take 1 Tab by mouth daily. insulin NPH/insulin regular 100 unit/mL (70-30) injection Commonly known as:  NOVOLIN 70/30, HUMULIN 70/30 Take 20 uints in the AM and 20 uints in the PM. LANTUS 100 unit/mL injection Generic drug:  insulin glargine Take 25 units daily * levothyroxine 125 mcg tablet Commonly known as:  SYNTHROID * levothyroxine 150 mcg tablet Commonly known as:  SYNTHROID Take 1 Tab by mouth daily. lisinopril 20 mg tablet Commonly known as:  Kim Jm Take 1 Tab by mouth daily. One Touch Delica 33 gauge Misc Generic drug:  lancets  
use as directed by prescriber four times a day with meals and at bedtime ONETOUCH ULTRA TEST strip Generic drug:  glucose blood VI test strips  
use as directed by prescriber four times a day with meals and at bedtime OXYGEN-AIR DELIVERY SYSTEMS  
by Does Not Apply route. Indications: [employer]  
  
 phenazopyridine 200 mg tablet Commonly known as:  PYRIDIUM Take 1 Tab by mouth three (3) times daily as needed for Pain for up to 3 days. pregabalin 100 mg capsule Commonly known as:  Juan Ruff Take 1 Cap by mouth three (3) times daily. Max Daily Amount: 300 mg.  
  
 sertraline 100 mg tablet Commonly known as:  ZOLOFT Take 0.5 Tabs by mouth daily. SUBOXONE 8-2 mg Film sublingaul film Generic drug:  buprenorphine-naloxone  
dissolve 2 and 1/2 (TWO AND A HALF) FILMS under the tongue daily  
  
 triamcinolone acetonide 0.1 % ointment Commonly known as:  KENALOG Apply  to affected area two (2) times a day. use thin layer * Notice: This list has 4 medication(s) that are the same as other medications prescribed for you. Read the directions carefully, and ask your doctor or other care provider to review them with you. Prescriptions Sent to Pharmacy Refills  
 ciprofloxacin HCl (CIPRO) 500 mg tablet 0 Sig: Take 1 Tab by mouth two (2) times a day for 7 days. Class: Normal  
 Pharmacy: [place] [id number], [facility] Ph #: [phone] Route: Oral  
 phenazopyridine (PYRIDIUM) 200 mg tablet 0 Sig: Take 1 Tab by mouth three (3) times daily as needed for Pain for up to 3 days. Class: Normal  
 Pharmacy: [place] [id number], [facility] Ph #: [phone] Route: Oral  
  
Patient Instructions Urinary Tract Infection in Women: Care Instructions Your Care Instructions A urinary tract infection, or UTI, is a general term for an infection anywhere between the kidneys and the urethra (where urine comes out). Most UTIs are bladder infections. They often cause pain or burning when you urinate. UTIs are caused by bacteria and can be cured with antibiotics.  Be sure to complete your treatment so that the infection goes away. Follow-up care is a key part of your treatment and safety. Be sure to make and go to all appointments, and call your doctor if you are having problems. It's also a good idea to know your test results and keep a list of the medicines you take. How can you care for yourself at home? · Take your antibiotics as directed. Do not stop taking them just because you feel better. You need to take the full course of antibiotics. · Drink extra water and other fluids for the next day or two. This may help wash out the bacteria that are causing the infection. (If you have kidney, heart, or liver disease and have to limit fluids, talk with your doctor before you increase your fluid intake.) · Avoid drinks that are carbonated or have caffeine. They can irritate the bladder. · Urinate often. Try to empty your bladder each time. · To relieve pain, take a hot bath or lay a heating pad set on low over your lower belly or genital area. Never go to sleep with a heating pad in place. To prevent UTIs · Drink plenty of water each day. This helps you urinate often, which clears bacteria from your system. (If you have kidney, heart, or liver disease and have to limit fluids, talk with your doctor before you increase your fluid intake.) · Urinate when you need to. · Urinate right after you have sex. · Change sanitary pads often. · Avoid douches, bubble baths, feminine hygiene sprays, and other feminine hygiene products that have deodorants. · After going to the bathroom, wipe from front to back. When should you call for help? Call your doctor now or seek immediate medical care if: 
? · Symptoms such as fever, chills, nausea, or vomiting get worse or appear for the first time. ? · You have new pain in your back just below your rib cage. This is called flank pain. ? · There is new blood or pus in your urine. ? · You have any problems with your antibiotic medicine. ?Watch closely for changes in your health, and be sure to contact your doctor if: 
? · You are not getting better after taking an antibiotic for 2 days. ? · Your symptoms go away but then come back. Where can you learn more? Go to http://chandrika-tyrone.info/. Enter G966 in the search box to learn more about \"Urinary Tract Infection in Women: Care Instructions. \" Current as of: May 12, 2017 Content Version: 11.4 © 0712-0425 [employer]. Care instructions adapted under license by [employer] (which disclaims liability or warranty for this information). If you have questions about a medical condition or this instruction, always ask your healthcare professional. [address] any warranty or liability for your use of this information. Introducing [facility]! [facility] introduces "[employer] patient portal. Now you can access parts of your medical record, email your doctor's office, and request medication refills online. 1. In your internet browser, go to [email or web address] 2. Click on the First Time User? Click Here link in the Sign In box. You will see the New Member Sign Up page. 3. Enter your "[employer] Access Code exactly as it appears below. You will not need to use this code after youve completed the sign-up process. If you do not sign up before the expiration date, you must request a new code. · "[employer] Access Code: [email or web address] Expires: 2/19/2018 10:55 AM 
 
4. Enter the last four digits of your Social Security Number (xxxx) and Date of Birth (mm/dd/yyyy) as indicated and click Submit. You will be taken to the next sign-up page. 5. Create a "[employer] ID. This will be your "[employer] login ID and cannot be changed, so think of one that is secure and easy to remember. 6. Create a [employer] password. You can change your password at any time. 7. Enter your Password Reset Question and Answer. This can be used at a later time if you forget your password. 8. Enter your e-mail address. You will receive e-mail notification when new information is available in [address] 9. Click Sign Up. You can now view and download portions of your medical record. 10. Click the Download Summary menu link to download a portable copy of your medical information. If you have questions, please visit the Frequently Asked Questions section of the "[employer]" website. Remember, "[employer]" is NOT to be used for urgent needs. For medical emergencies, dial 911. Now available from your iPhone and Android! Please provide this summary of care documentation to your next provider. Your primary care clinician is listed as Lillie Lovett. If you have any questions after today's visit, please call [phone].

## 2017-11-30 NOTE — PROGRESS NOTES
Chief Complaint   Patient presents with    Urinary Frequency     PAIN FELT UPON URINATION X 4 DAYS; TRIED OTC URINARY PAIN RELIEF     1. Have you been to the ER, urgent care clinic since your last visit? Hospitalized since your last visit? No    2. Have you seen or consulted any other health care providers outside of the [address] [place] [address] since your last visit? Include any pap smears or colon screening. No     There are no preventive care reminders to display for this patient. Do you have an [address] in place in the event that you have a healthcare crisis that could impact your decision making as it pertains to your health? NO    Would you like information about Advance Care Planning? NO    Information given.  NO

## 2017-11-30 NOTE — PATIENT INSTRUCTIONS

## 2017-11-30 NOTE — PROGRESS NOTES
HISTORY OF PRESENT ILLNESS  Yahaira Floyd is a 57 y.o. female. HPI  Chief Complaint   Patient presents with    Urinary Frequency     PAIN FELT UPON URINATION X 4 DAYS; TRIED OTC URINARY PAIN RELIEF   Patient has been taking OTC medication that has turned urine orange. Urinalysis indicates UTI. Patient is willing to take ABT. Review of Systems   Constitutional: Negative for chills, fever and malaise/fatigue. HENT: Negative. Eyes: Negative. Respiratory: Negative. Negative for cough and wheezing. Cardiovascular: Negative. Negative for chest pain and leg swelling. Gastrointestinal: Negative for abdominal pain, diarrhea, nausea and vomiting. Genitourinary: Negative. Musculoskeletal: Negative. Physical Exam   Constitutional: She appears well-developed and well-nourished. No distress. HENT:   Head: Normocephalic and atraumatic. Eyes: Conjunctivae are normal.   Neck: Normal range of motion. Neck supple. Cardiovascular: Normal rate, regular rhythm, normal heart sounds and intact distal pulses. Exam reveals no gallop and no friction rub. No murmur heard. Pulmonary/Chest: Effort normal and breath sounds normal. No respiratory distress. She has no wheezes. She has no rales. Using oxygen 3 liters   Abdominal: Soft. She exhibits no distension. There is no tenderness. There is no rebound. Musculoskeletal: Normal range of motion. Neurological: She is alert. Skin: Skin is warm and dry. She is not diaphoretic. Nursing note and vitals reviewed. Plan of care and AVS reviewed with patient who verbalized understanding. ASSESSMENT and PLAN    ICD-10-CM ICD-9-CM    1. Acute cystitis with hematuria N30.01 595.0 ciprofloxacin HCl (CIPRO) 500 mg tablet      phenazopyridine (PYRIDIUM) 200 mg tablet      CULTURE, URINE      AMB POC URINALYSIS DIP STICK AUTO W/O MICRO   Started Cipro and pryridium and use discussed with patient. Encouraged to keep well hydrated.   Will notify patient of lab result.

## 2017-12-02 LAB — BACTERIA UR CULT: ABNORMAL

## 2017-12-07 ENCOUNTER — TELEPHONE (OUTPATIENT)
Dept: INTERNAL MEDICINE CLINIC | Age: 62
End: 2017-12-07

## 2017-12-07 NOTE — TELEPHONE ENCOUNTER
Pt would like a call back from dr long in ref to her oxygen stated that she understands what is going on so she would like to speak with her directly

## 2017-12-07 NOTE — TELEPHONE ENCOUNTER
Pt would like to speak with Dr. Eleuterio Carmona about her oxygen. Please call her at [phone].

## 2017-12-08 NOTE — TELEPHONE ENCOUNTER
Chief Complaint   Patient presents with    O2/Oxygen     [facility] PORTABLE TANKS     Spoke with Sharyle Cal who then transferred me to [facility] in regards to the order for the [employer] portable oxygen. Sharyle Cal stated that the patient needed an order for a walking test to be done by their respiratory therapist.  I have spoken with [facility] and she informed me that she has received the CMN (Certificate of Medical Necessity and that her information was sent to [facility] (Billing) and that it may take 2-3 weeks. She also says, \"that they receive 1,000's of orders through the department and it may take longer. \"  Informed patient of the hold-up of her portable oxygen carrier. Understanding verbalized.

## 2017-12-11 ENCOUNTER — TELEPHONE (OUTPATIENT)
Dept: INTERNAL MEDICINE CLINIC | Age: 62
End: 2017-12-11

## 2017-12-11 NOTE — TELEPHONE ENCOUNTER
Chief Complaint   Patient presents with    Results     URINE CULTURE     LM for the patient to return call to discuss lab results.

## 2017-12-11 NOTE — TELEPHONE ENCOUNTER
----- Message from Daniela Fernandez NP sent at 12/5/2017 11:43 AM EST -----  Advise patient that cipro will cure UTI based on culture.

## 2017-12-26 ENCOUNTER — TELEPHONE (OUTPATIENT)
Dept: INTERNAL MEDICINE CLINIC | Age: 62
End: 2017-12-26

## 2017-12-26 NOTE — TELEPHONE ENCOUNTER
Juanjo Mccray, from [address] in [place], called in reference to needing pts most recent 6 minute walk test. Please fax to [phone].

## 2017-12-26 NOTE — TELEPHONE ENCOUNTER
Called patient - advised that Gloria Da Silvashwethakim is wanting a 6 minute walking test to see qualifiers for oxygen therapy - upon last visit Rommel Roldan NP had requested a 1 month follow up anyway - appt scheduled for 12/29/17 with Rommel Christine LPN  [phone]/[id number]  0:44 PM

## 2017-12-29 ENCOUNTER — OFFICE VISIT (OUTPATIENT)
Dept: INTERNAL MEDICINE CLINIC | Age: 62
End: 2017-12-29

## 2017-12-29 VITALS
HEIGHT: 69 IN | BODY MASS INDEX: 20.38 KG/M2 | TEMPERATURE: 97.8 F | RESPIRATION RATE: 24 BRPM | SYSTOLIC BLOOD PRESSURE: 147 MMHG | OXYGEN SATURATION: 93 % | HEART RATE: 105 BPM | DIASTOLIC BLOOD PRESSURE: 62 MMHG | WEIGHT: 137.6 LBS

## 2017-12-29 DIAGNOSIS — J44.9 CHRONIC OBSTRUCTIVE PULMONARY DISEASE, UNSPECIFIED COPD TYPE (HCC): ICD-10-CM

## 2017-12-29 LAB — HBA1C MFR BLD HPLC: 10.2 %

## 2017-12-29 NOTE — MR AVS SNAPSHOT
Visit Information Date & Time Provider Department Dept. Phone Encounter #  
 12/29/2017 11:00 AM Dominik Swain, 1 [facility] [place] [facility] [id number] Follow-up Instructions Return in about 3 months (around 3/29/2018) for DM. Upcoming Health Maintenance Date Due  
 EYE EXAM RETINAL OR DILATED Q1 12/30/2017* HEMOGLOBIN A1C Q6M 5/21/2018 LIPID PANEL Q1 7/10/2018 MICROALBUMIN Q1 7/19/2018 FOOT EXAM Q1 8/14/2018 PAP AKA CERVICAL CYTOLOGY 9/14/2020 COLONOSCOPY 6/21/2022 DTaP/Tdap/Td series (2 - Td) 5/12/2027 *Topic was postponed. The date shown is not the original due date. Allergies as of 12/29/2017  Review Complete On: 12/29/2017 By: Dominik Swain NP No Known Allergies Current Immunizations  Reviewed on 9/14/2017 Name Date Influenza Vaccine 10/28/2016 12:00 AM, 1/29/2015 12:00 AM, 1/16/2014 12:00 AM, 11/12/2012 12:00 AM, 10/18/2012 12:00 AM, 11/17/2009 12:00 AM  
 Influenza Vaccine (Quad) PF 9/14/2017, 10/6/2015 12:00 AM  
 Pneumococcal Polysaccharide (PPSV-23) 10/18/2012 12:00 AM  
 Zoster Vaccine, Live 11/12/2012 12:00 AM  
  
 Not reviewed this visit You Were Diagnosed With   
  
 Codes Comments Uncontrolled type 2 diabetes mellitus with diabetic nephropathy, with long-term current use of insulin (HCC)     ICD-10-CM: E11.21, E11.65, Z79.4 ICD-9-CM: 250.42, 583.81, V58.67 Vitals BP Pulse Temp Resp Height(growth percentile) 147/62 (BP 1 Location: Left arm, BP Patient Position: Sitting) (!) 105 97.8 °F (36.6 °C) (Temporal) 24 5' 9\" (1.753 m) Weight(growth percentile) SpO2 BMI OB Status Smoking Status 137 lb 9.6 oz (62.4 kg) 93% 20.32 kg/m2 Postmenopausal Former Smoker Vitals History BMI and BSA Data Body Mass Index Body Surface Area  
 20.32 kg/m 2 1.74 m 2 Preferred Pharmacy Pharmacy Name Phone  [facility] [address] [phone] Your Updated Medication List  
  
   
This list is accurate as of: 12/29/17 11:14 AM.  Always use your most recent med list.  
  
  
  
  
 albuterol-ipratropium 2.5 mg-0.5 mg/3 ml Nebu Commonly known as:  DUO-NEB  
3 mL by Nebulization route every four (4) hours as needed. aspirin delayed-release 81 mg tablet Take 1 Tab by mouth daily. * atorvastatin 20 mg tablet Commonly known as:  LIPITOR Take 1 Tab by mouth daily. * atorvastatin 10 mg tablet Commonly known as:  LIPITOR  
  
 BD INSULIN SYRINGE ULTRA-FINE 1 mL 31 gauge x 5/16 Syrg Generic drug:  Insulin Syringe-Needle U-100  
  
 busPIRone 7.5 mg tablet Commonly known as:  BUSPAR Take 1 Tab by mouth two (2) times a day. furosemide 20 mg tablet Commonly known as:  LASIX Take 1 Tab by mouth daily. insulin NPH/insulin regular 100 unit/mL (70-30) injection Commonly known as:  NOVOLIN 70/30, HUMULIN 70/30 Take 20 uints in the AM and 20 uints in the PM. LANTUS 100 unit/mL injection Generic drug:  insulin glargine Take 25 units daily * levothyroxine 125 mcg tablet Commonly known as:  SYNTHROID * levothyroxine 150 mcg tablet Commonly known as:  SYNTHROID Take 1 Tab by mouth daily. lisinopril 20 mg tablet Commonly known as:  Jed Economy Take 1 Tab by mouth daily. One Touch Delica 33 gauge Misc Generic drug:  lancets  
use as directed by prescriber four times a day with meals and at bedtime ONETOUCH ULTRA TEST strip Generic drug:  glucose blood VI test strips  
use as directed by prescriber four times a day with meals and at bedtime OXYGEN-AIR DELIVERY SYSTEMS  
by Does Not Apply route. Indications: [employer]  
  
 pregabalin 100 mg capsule Commonly known as:  Shelda Prime Take 1 Cap by mouth three (3) times daily. Max Daily Amount: 300 mg.  
  
 sertraline 100 mg tablet Commonly known as:  ZOLOFT Take 0.5 Tabs by mouth daily. SUBOXONE 8-2 mg Film sublingaul film Generic drug:  buprenorphine-naloxone  
dissolve 2 and 1/2 (TWO AND A HALF) FILMS under the tongue daily  
  
 triamcinolone acetonide 0.1 % ointment Commonly known as:  KENALOG Apply  to affected area two (2) times a day. use thin layer * Notice: This list has 4 medication(s) that are the same as other medications prescribed for you. Read the directions carefully, and ask your doctor or other care provider to review them with you. We Performed the Following AMB POC HEMOGLOBIN A1C [51114 CPT(R)] Follow-up Instructions Return in about 3 months (around 3/29/2018) for DM. Introducing [facility] & HEALTH SERVICES! J Luis Gonzalez introduces [employer] patient portal. Now you can access parts of your medical record, email your doctor's office, and request medication refills online. 1. In your internet browser, go to [email or web address] 2. Click on the First Time User? Click Here link in the Sign In box. You will see the New Member Sign Up page. 3. Enter your ImageTag Access Code exactly as it appears below. You will not need to use this code after youve completed the sign-up process. If you do not sign up before the expiration date, you must request a new code. · ImageTag Access Code: [email or web address] Expires: 2/19/2018 10:55 AM 
 
4. Enter the last four digits of your Social Security Number (xxxx) and Date of Birth (mm/dd/yyyy) as indicated and click Submit. You will be taken to the next sign-up page. 5. Create a yavalut ID. This will be your ImageTag login ID and cannot be changed, so think of one that is secure and easy to remember. 6. Create a ImageTag password. You can change your password at any time. 7. Enter your Password Reset Question and Answer. This can be used at a later time if you forget your password. 8. Enter your e-mail address.  You will receive e-mail notification when new information is available in [employer]. 9. Click Sign Up. You can now view and download portions of your medical record. 10. Click the Download Summary menu link to download a portable copy of your medical information. If you have questions, please visit the Frequently Asked Questions section of the [employer] website. Remember, [employer] is NOT to be used for urgent needs. For medical emergencies, dial 911. Now available from your iPhone and Android! Please provide this summary of care documentation to your next provider. Your primary care clinician is listed as Caio Sheth. If you have any questions after today's visit, please call [phone].

## 2017-12-29 NOTE — PROGRESS NOTES
Chief Complaint   Patient presents with    Breathing Problem     WALKING TEST PER JOSH     1. Have you been to the ER, urgent care clinic since your last visit? Hospitalized since your last visit? No    2. Have you seen or consulted any other health care providers outside of the [address] [place] [address] [place] since your last visit? Include any pap smears or colon screening. No     There are no preventive care reminders to display for this patient. Do you have an [address] in place in the event that you have a healthcare crisis that could impact your decision making as it pertains to your health? NO    Would you like information about Advance Care Planning? NO    Information given. NO    Patient was unable to complete the 6 minute walk test.  Patient only completed 3 mins 15 seconds, before she c/o being short-winded, coughing and walking pace has slowed down.

## 2017-12-29 NOTE — PROGRESS NOTES
HISTORY OF PRESENT ILLNESS  Carolyn Galindo is a 57 y.o. female. HPI  Chief Complaint   Patient presents with    Breathing Problem     WALKING TEST PER [facility]     Patient given 6 minute walk test per [facility]   Patient is currently using oxygen 3 liters/NC  Six minute walk test done off oxygen. States blood sugars have been high related to not eating properly. A1c today 10.2 as last A1c 8.5. Patient admits to not taking insulin or eating appropriately to manage DM. Review of Systems   Constitutional: Negative for chills, fever and malaise/fatigue. Eyes: Negative. Respiratory: Negative. Negative for cough, shortness of breath and wheezing. Cardiovascular: Negative. Negative for chest pain and leg swelling. Gastrointestinal: Negative for abdominal pain, constipation, diarrhea, nausea and vomiting. Genitourinary: Negative. Musculoskeletal: Negative. Neurological: Negative. Physical Exam   Constitutional: She is oriented to person, place, and time. She appears well-developed and well-nourished. No distress. HENT:   Head: Normocephalic and atraumatic. Eyes: Conjunctivae are normal.   Cardiovascular: Normal rate, regular rhythm, normal heart sounds and intact distal pulses. Exam reveals no gallop and no friction rub. No murmur heard. Pulmonary/Chest: Effort normal and breath sounds normal. No respiratory distress. She has no wheezes. Patient experienced SOB. Cough while performing the Six minute walk test   Musculoskeletal: Normal range of motion. Neurological: She is alert and oriented to person, place, and time. Skin: Skin is warm and dry. She is not diaphoretic. Nursing note and vitals reviewed. Plan of care and AVS reviewed with patient who verbalized understanding. ASSESSMENT and PLAN    ICD-10-CM ICD-9-CM    1.  Uncontrolled type 2 diabetes mellitus with diabetic nephropathy, with long-term current use of insulin ([facility]) E11.21 250.42 AMB POC HEMOGLOBIN A1C E11.65 583.81     Z79.4 V58.67    2. Chronic obstructive pulmonary disease, unspecified COPD type ([facility] J44.9 496    With Six minute walk test patient experienced cough, elevated BP, elevated pulse and drop in O2 saturation and increase in respirations. 3 minutes 15 seconds into walk. 10:51AM                                                                                                    /66  P        80  RR     20  O2 Sat 93 % ( On RA)    11:00 AM  ( 3 minutes 15 seconds into walk)  BP  147/62  P       105  RR      24  O2Sat  88% ( On RA)  Walk stopped and patient placed back on Oxygen. Advised patient information to be sent to [facility]. Advised patient to take DM medications, eat appropriately to help manage DM. F/u 3 months DM.

## 2018-01-26 ENCOUNTER — TELEPHONE (OUTPATIENT)
Dept: INTERNAL MEDICINE CLINIC | Age: 63
End: 2018-01-26

## 2018-01-26 NOTE — TELEPHONE ENCOUNTER
----- Message from Liquid X sent at 1/26/2018 10:50 AM EST -----  Regarding: NP, Lesley Kapadia / Court Loser with [facility] [phone], is requesting pt's chart notes for November and December of 2017.    Fax: [phone]

## 2018-01-26 NOTE — TELEPHONE ENCOUNTER
Spoke with Odalys Kessler from [facility] would like Office notes from pt's  november and December visit   [phone]

## 2018-01-26 NOTE — TELEPHONE ENCOUNTER
Chief Complaint   Patient presents with    Documentation     NOVEMBER AND DECEMBER PROGRESS NOTES. Progress notes faxed to [phone] and was received.

## 2018-02-07 ENCOUNTER — TELEPHONE (OUTPATIENT)
Dept: INTERNAL MEDICINE CLINIC | Age: 63
End: 2018-02-07

## 2018-02-07 NOTE — TELEPHONE ENCOUNTER
Pt called in reference to needing another prescription called in for her lantus. Ins company is no longer paying for it. She also would like to talk to someone about her oxygen with [facility]. They keep saying the office is not doing what they are suppose to do. Please call her at [phone].

## 2018-02-08 NOTE — TELEPHONE ENCOUNTER
Chief Complaint   Patient presents with    Other    Medication Refill     REPLACEMENT FOR LANTUS    New Order     SIMPLY GO PORTABLE OXYGEN TANKS     Spoke with Asael Tesfaye who will leave message with Danielle Dos Santos to return call in regards to portable tanks. Patient requests replacement for Lantus, since insurance will no longer cover medication.

## 2018-02-09 NOTE — TELEPHONE ENCOUNTER
Chief Complaint   Patient presents with    Other    Medication Refill     REPLACEMENT FOR LANTUS    New Order     SIMPLY GO PORTABLE OXYGEN TANKS     Message left via call center to return call in regards to supply order.

## 2018-04-27 ENCOUNTER — OFFICE VISIT (OUTPATIENT)
Dept: INTERNAL MEDICINE CLINIC | Age: 63
End: 2018-04-27

## 2018-04-27 VITALS
HEIGHT: 69 IN | DIASTOLIC BLOOD PRESSURE: 53 MMHG | RESPIRATION RATE: 20 BRPM | TEMPERATURE: 98.7 F | WEIGHT: 159 LBS | OXYGEN SATURATION: 100 % | SYSTOLIC BLOOD PRESSURE: 149 MMHG | HEART RATE: 82 BPM | BODY MASS INDEX: 23.55 KG/M2

## 2018-04-27 DIAGNOSIS — F32.A DEPRESSION, UNSPECIFIED DEPRESSION TYPE: ICD-10-CM

## 2018-04-27 DIAGNOSIS — E07.9 THYROID DISEASE: Primary | ICD-10-CM

## 2018-04-27 DIAGNOSIS — Z12.39 SCREENING FOR MALIGNANT NEOPLASM OF BREAST: ICD-10-CM

## 2018-04-27 DIAGNOSIS — R20.8 BURNING SENSATION OF FEET: ICD-10-CM

## 2018-04-27 DIAGNOSIS — J44.9 CHRONIC OBSTRUCTIVE PULMONARY DISEASE, UNSPECIFIED COPD TYPE (HCC): ICD-10-CM

## 2018-04-27 DIAGNOSIS — I10 ESSENTIAL HYPERTENSION: ICD-10-CM

## 2018-04-27 DIAGNOSIS — R60.0 LOCALIZED EDEMA: ICD-10-CM

## 2018-04-27 RX ORDER — LEVOTHYROXINE SODIUM 150 UG/1
150 TABLET ORAL DAILY
Qty: 30 TAB | Refills: 5 | COMMUNITY
Start: 2018-04-27 | End: 2018-05-03 | Stop reason: SDUPTHER

## 2018-04-27 RX ORDER — PREGABALIN 100 MG/1
100 CAPSULE ORAL 3 TIMES DAILY
Qty: 90 CAP | Refills: 5 | Status: SHIPPED | OUTPATIENT
Start: 2018-04-27 | End: 2018-12-14 | Stop reason: SDUPTHER

## 2018-04-27 RX ORDER — LISINOPRIL 20 MG/1
20 TABLET ORAL DAILY
Qty: 90 TAB | Refills: 1 | Status: SHIPPED | OUTPATIENT
Start: 2018-04-27 | End: 2019-01-08 | Stop reason: SDUPTHER

## 2018-04-27 RX ORDER — BUSPIRONE HYDROCHLORIDE 7.5 MG/1
7.5 TABLET ORAL 2 TIMES DAILY
Qty: 180 TAB | Refills: 1 | Status: SHIPPED | OUTPATIENT
Start: 2018-04-27 | End: 2019-01-08 | Stop reason: SDUPTHER

## 2018-04-27 RX ORDER — FUROSEMIDE 20 MG/1
20 TABLET ORAL DAILY
Qty: 30 TAB | Refills: 5 | Status: SHIPPED | OUTPATIENT
Start: 2018-04-27 | End: 2018-07-27 | Stop reason: ALTCHOICE

## 2018-04-27 NOTE — MR AVS SNAPSHOT
[address] [address]85 [facility] 
[phone] Patient: Josh Ramon MRN: [id number] Visit Information Date & Time Provider Department Dept. Phone Encounter #  
 4/27/2018 10:15 AM Joanne Hillman, 1 [facility] [phone] Follow-up Instructions Return in about 3 months (around 7/27/2018) for DM. Upcoming Health Maintenance Date Due  
 BREAST CANCER SCRN MAMMOGRAM 11/25/2003 EYE EXAM RETINAL OR DILATED Q1 11/18/2017 HEMOGLOBIN A1C Q6M 6/29/2018 LIPID PANEL Q1 7/10/2018 MICROALBUMIN Q1 7/19/2018 Influenza Age 5 to Adult 8/1/2018 FOOT EXAM Q1 8/14/2018 PAP AKA CERVICAL CYTOLOGY 9/14/2020 COLONOSCOPY 6/21/2022 DTaP/Tdap/Td series (2 - Td) 5/12/2027 Allergies as of 4/27/2018  Review Complete On: 4/27/2018 By: Daphney Spencer LPN No Known Allergies Current Immunizations  Reviewed on 9/14/2017 Name Date Influenza Vaccine 10/28/2016 12:00 AM, 1/29/2015 12:00 AM, 1/16/2014 12:00 AM, 11/12/2012 12:00 AM, 10/18/2012 12:00 AM, 11/17/2009 12:00 AM  
 Influenza Vaccine (Quad) PF 9/14/2017, 10/6/2015 12:00 AM  
 Pneumococcal Polysaccharide (PPSV-23) 10/18/2012 12:00 AM  
 Zoster Vaccine, Live 11/12/2012 12:00 AM  
  
 Not reviewed this visit You Were Diagnosed With   
  
 Codes Comments Thyroid disease    -  Primary ICD-10-CM: E07.9 ICD-9-CM: 246.9 Localized edema     ICD-10-CM: R60.0 ICD-9-CM: 782.3 Essential hypertension     ICD-10-CM: I10 
ICD-9-CM: 401.9 Depression, unspecified depression type     ICD-10-CM: F32.9 ICD-9-CM: 371 Burning sensation of feet     ICD-10-CM: R20.8 ICD-9-CM: 782.0 Uncontrolled type 2 diabetes mellitus without complication, with long-term current use of insulin (HCC)     ICD-10-CM: E11.65, Z79.4 ICD-9-CM: 250.02, V58.67   
 Screening for malignant neoplasm of breast     ICD-10-CM: Z12.31 
ICD-9-CM: V76.10 Vitals BP Pulse Temp Resp Height(growth percentile) Weight(growth percentile) 149/53 (BP 1 Location: Left arm, BP Patient Position: At rest) 82 98.7 °F (37.1 °C) (Oral) 20 5' 9\" (1.753 m) 159 lb (72.1 kg) SpO2 BMI OB Status Smoking Status 100% 23.48 kg/m2 Postmenopausal Former Smoker BMI and BSA Data Body Mass Index Body Surface Area  
 23.48 kg/m 2 1.87 m 2 Preferred Pharmacy Pharmacy Name Phone [facility] [address] [phone] Your Updated Medication List  
  
   
This list is accurate as of 4/27/18 10:54 AM.  Always use your most recent med list.  
  
  
  
  
 albuterol-ipratropium 2.5 mg-0.5 mg/3 ml Nebu Commonly known as:  DUO-NEB  
3 mL by Nebulization route every four (4) hours as needed. aspirin delayed-release 81 mg tablet Take 1 Tab by mouth daily. * atorvastatin 20 mg tablet Commonly known as:  LIPITOR Take 1 Tab by mouth daily. * atorvastatin 10 mg tablet Commonly known as:  LIPITOR  
  
 BD INSULIN SYRINGE ULTRA-FINE 1 mL 31 gauge x 5/16 Syrg Generic drug:  Insulin Syringe-Needle U-100  
  
 busPIRone 7.5 mg tablet Commonly known as:  BUSPAR Take 1 Tab by mouth two (2) times a day. furosemide 20 mg tablet Commonly known as:  LASIX Take 1 Tab by mouth daily. insulin detemir U-100 100 unit/mL (3 mL) Inpn Commonly known as:  Nathan [place] Take 24 units daily at night. insulin NPH/insulin regular 100 unit/mL (70-30) injection Commonly known as:  NOVOLIN 70/30, HUMULIN 70/30 Take 20 uints in the AM and 20 uints in the PM.  
  
 * levothyroxine 125 mcg tablet Commonly known as:  SYNTHROID * levothyroxine 150 mcg tablet Commonly known as:  SYNTHROID Take 1 Tab by mouth daily. lisinopril 20 mg tablet Commonly known as:  Kavin Little Take 1 Tab by mouth daily. One Touch Delica 33 gauge Misc Generic drug:  lancets  
use as directed by prescriber four times a day with meals and at bedtime ONETOUCH ULTRA TEST strip Generic drug:  glucose blood VI test strips  
use as directed by prescriber four times a day with meals and at bedtime OXYGEN-AIR DELIVERY SYSTEMS  
by Does Not Apply route. Indications: [employer]  
  
 pregabalin 100 mg capsule Commonly known as:  Lorrene Ruffini Take 1 Cap by mouth three (3) times daily. Max Daily Amount: 300 mg.  
  
 sertraline 100 mg tablet Commonly known as:  ZOLOFT Take 0.5 Tabs by mouth daily. triamcinolone acetonide 0.1 % ointment Commonly known as:  KENALOG Apply  to affected area two (2) times a day. use thin layer * Notice: This list has 4 medication(s) that are the same as other medications prescribed for you. Read the directions carefully, and ask your doctor or other care provider to review them with you. Prescriptions Printed Refills  
 pregabalin (LYRICA) 100 mg capsule 5 Sig: Take 1 Cap by mouth three (3) times daily. Max Daily Amount: 300 mg. Class: Print Route: Oral  
  
Prescriptions Sent to Pharmacy Refills  
 furosemide (LASIX) 20 mg tablet 5 Sig: Take 1 Tab by mouth daily. Class: Normal  
 Pharmacy: [place] [address] [address] [facility] Ph #: [phone] Route: Oral  
 lisinopril (PRINIVIL, ZESTRIL) 20 mg tablet 1 Sig: Take 1 Tab by mouth daily. Class: Normal  
 Pharmacy: [place] [address] [address] [facility] Ph #: [phone] Route: Oral  
 busPIRone (BUSPAR) 7.5 mg tablet 1 Sig: Take 1 Tab by mouth two (2) times a day. Class: Normal  
 Pharmacy: [place] [address] [address] [facility] Ph #: [phone] Route: Oral  
  
We Performed the Following AMB POC HEMOGLOBIN A1C [12496 CPT(R)] METABOLIC PANEL, COMPREHENSIVE [86114 CPT(R)] TSH 3RD GENERATION [73925 CPT(R)] Follow-up Instructions Return in about 3 months (around 7/27/2018) for DM. To-Do List   
 04/27/2018 Imaging:  [facility] BI SCREENING INCL CAD Introducing hospitals & HEALTH SERVICES! Amie Brenda introduces [employer] patient portal. Now you can access parts of your medical record, email your doctor's office, and request medication refills online. 1. In your internet browser, go to [email or web address] 2. Click on the First Time User? Click Here link in the Sign In box. You will see the New Member Sign Up page. 3. Enter your [employer] Access Code exactly as it appears below. You will not need to use this code after youve completed the sign-up process. If you do not sign up before the expiration date, you must request a new code. · [employer] Access Code: [email or web address] 
Expires: 7/26/2018  9:48 AM 
 
4. Enter the last four digits of your Social Security Number (xxxx) and Date of Birth (mm/dd/yyyy) as indicated and click Submit. You will be taken to the next sign-up page. 5. Create a [employer] ID. This will be your [employer] login ID and cannot be changed, so think of one that is secure and easy to remember. 6. Create a [employer] password. You can change your password at any time. 7. Enter your Password Reset Question and Answer. This can be used at a later time if you forget your password. 8. Enter your e-mail address. You will receive e-mail notification when new information is available in [address] 9. Click Sign Up. You can now view and download portions of your medical record. 10. Click the Download Summary menu link to download a portable copy of your medical information. If you have questions, please visit the Frequently Asked Questions section of the [employer] website.  Remember, [employer] is NOT to be used for urgent needs. For medical emergencies, dial 911. Now available from your iPhone and Android! Please provide this summary of care documentation to your next provider. Your primary care clinician is listed as [facility]. If you have any questions after today's visit, please call [phone].

## 2018-04-27 NOTE — PROGRESS NOTES
C/o swelling in both feet and ankles - weight gain  Nasrin Yeung LPN  7/29/2210  81:40 AM  No flowsheet data found. PHQ over the last two weeks 11/4/2016   Little interest or pleasure in doing things More than half the days   Feeling down, depressed or hopeless More than half the days   Total Score PHQ 2 4   Trouble falling or staying asleep, or sleeping too much More than half the days   Feeling tired or having little energy Nearly every day   Poor appetite or overeating More than half the days   Feeling bad about yourself - or that you are a failure or have let yourself or your family down More than half the days   Trouble concentrating on things such as school, work, reading or watching TV Nearly every day   Moving or speaking so slowly that other people could have noticed; or the opposite being so fidgety that others notice Several days   Thoughts of being better off dead, or hurting yourself in some way Several days   PHQ 9 Score 18   How difficult have these problems made it for you to do your work, take care of your home and get along with others Very difficult       Abuse Screening Questionnaire 5/12/2017   Do you ever feel afraid of your partner? N   Are you in a relationship with someone who physically or mentally threatens you? N   Is it safe for you to go home?  Y       ADL Assessment 5/12/2017   Feeding yourself No Help Needed   Getting from bed to chair No Help Needed   Getting dressed No Help Needed   Bathing or showering No Help Needed   Walk across the room (includes cane/walker) No Help Needed   Using the telphone No Help Needed   Taking your medications No Help Needed   Preparing meals No Help Needed   Managing money (expenses/bills) No Help Needed   Moderately strenuous housework (laundry) No Help Needed   Shopping for personal items (toiletries/medicines) No Help Needed   Shopping for groceries No Help Needed   Driving No Help Needed   Climbing a flight of stairs No Help Needed   Getting to places beyond walking distances No Help Needed               On oxygen at 3LPM

## 2018-04-28 LAB
ALBUMIN SERPL-MCNC: 4.3 G/DL (ref 3.6–4.8)
ALBUMIN/GLOB SERPL: 1.7 {RATIO} (ref 1.2–2.2)
ALP SERPL-CCNC: 122 IU/L (ref 39–117)
ALT SERPL-CCNC: 16 IU/L (ref 0–32)
AST SERPL-CCNC: 20 IU/L (ref 0–40)
BILIRUB SERPL-MCNC: 0.4 MG/DL (ref 0–1.2)
BUN SERPL-MCNC: 18 MG/DL (ref 8–27)
BUN/CREAT SERPL: 20 (ref 12–28)
CALCIUM SERPL-MCNC: 8.8 MG/DL (ref 8.7–10.3)
CHLORIDE SERPL-SCNC: 93 MMOL/L (ref 96–106)
CO2 SERPL-SCNC: 31 MMOL/L (ref 18–29)
CREAT SERPL-MCNC: 0.89 MG/DL (ref 0.57–1)
GFR SERPLBLD CREATININE-BSD FMLA CKD-EPI: 69 ML/MIN/1.73
GFR SERPLBLD CREATININE-BSD FMLA CKD-EPI: 79 ML/MIN/1.73
GLOBULIN SER CALC-MCNC: 2.5 G/DL (ref 1.5–4.5)
GLUCOSE SERPL-MCNC: 317 MG/DL (ref 65–99)
POTASSIUM SERPL-SCNC: 4.7 MMOL/L (ref 3.5–5.2)
PROT SERPL-MCNC: 6.8 G/DL (ref 6–8.5)
SODIUM SERPL-SCNC: 138 MMOL/L (ref 134–144)
TSH SERPL DL<=0.005 MIU/L-ACNC: 8.01 UIU/ML (ref 0.45–4.5)

## 2018-04-29 PROBLEM — J44.9 CHRONIC OBSTRUCTIVE PULMONARY DISEASE (HCC): Status: ACTIVE | Noted: 2018-04-29

## 2018-04-29 NOTE — PATIENT INSTRUCTIONS
Encouraged to take fasting blood sugars daily    Encouraged patient to take medications as prescribed.

## 2018-04-29 NOTE — PROGRESS NOTES
HISTORY OF PRESENT ILLNESS  Bharati Franklin is a 57 y.o. female. HPI  Chief Complaint   Patient presents with    Foot Swelling     swelling in feet and ankles     Patient C/O swelling of feet and ankles 1-2 weeks after stopping fluid pill. States stopped fluid pill because she did not have any left. Patient seemed not aware that she had 5 refills on furosemide she could get. Patient states does moses take blood sugar daily  States does not remember what her blood sugar are  States has been taking thyroid medication 150 mcg  Will check Tsh today. POC A1c   Patient states insurance does not pay enough for insulin and cannot afford to get levemir or 70/30 insulin. Patient not taking insulin as prescribed. Patient in agreement to get mammogram     Patient request refill of mammograms    COPD stable today. Review of Systems   Constitutional: Negative for chills, fever and malaise/fatigue. HENT: Negative. Negative for congestion, ear pain and hearing loss. Eyes: Negative. Negative for blurred vision, double vision and pain. Respiratory: Negative. Negative for cough, shortness of breath and wheezing. C/O not getting portable tank for ambulation in public. C/O tank running out quickly   Cardiovascular: Positive for leg swelling. Negative for chest pain. Gastrointestinal: Negative for abdominal pain, constipation, diarrhea, nausea and vomiting. Genitourinary: Negative. Musculoskeletal: Negative. Skin: Negative. Physical Exam   Constitutional: She is oriented to person, place, and time. She appears well-developed and well-nourished. No distress. HENT:   Head: Normocephalic and atraumatic. Eyes: Conjunctivae are normal.   Neck: Normal range of motion. Neck supple. Cardiovascular: Normal rate, regular rhythm, normal heart sounds and intact distal pulses. Exam reveals no gallop and no friction rub. No murmur heard.   Positive for 2+ swelling B legs  Wearing compression stockings today   Pulmonary/Chest: Effort normal and breath sounds normal. No respiratory distress. She has no wheezes. She has no rales. Abdominal: Soft. Bowel sounds are normal. She exhibits no distension. There is no tenderness. Musculoskeletal: Normal range of motion. Neurological: She is alert and oriented to person, place, and time. Skin: Skin is warm and dry. She is not diaphoretic. Nursing note and vitals reviewed. Plan of care and AVS reviewed with patient who verbalized understanding. ASSESSMENT and PLAN    Chronic Conditions Addressed Today     1. Depression     Relevant Medications     busPIRone (BUSPAR) 7.5 mg tablet     pregabalin (LYRICA) 100 mg capsule     Other Relevant Orders     [place] HANDLG&/OR CONVEY OF SPEC FOR TR OFFICE TO LAB     COLLECTION VENOUS BLOOD,VENIPUNCTURE    2. Thyroid disease - Primary     Relevant Medications     levothyroxine (SYNTHROID) 150 mcg tablet     insulin glargine 300 unit/mL (3 mL) inpn     Other Relevant Orders     TSH 3RD GENERATION (Completed)     [place] HANDLG&/OR CONVEY OF SPEC FOR TR OFFICE TO LAB     COLLECTION VENOUS BLOOD,VENIPUNCTURE    3.  Chronic obstructive pulmonary disease (HCC)      Other Problems Addressed Today     Localized edema            Relevant Medications        furosemide (LASIX) 20 mg tablet        lisinopril (PRINIVIL, ZESTRIL) 20 mg tablet        Other Relevant Orders        [place] HANDLG&/OR CONVEY OF SPEC FOR TR OFFICE TO LAB        COLLECTION VENOUS BLOOD,VENIPUNCTURE    Essential hypertension            Relevant Medications        furosemide (LASIX) 20 mg tablet        lisinopril (PRINIVIL, ZESTRIL) 20 mg tablet        Other Relevant Orders        METABOLIC PANEL, COMPREHENSIVE (Completed)        [place] HANDLG&/OR CONVEY OF SPEC FOR TR OFFICE TO LAB        COLLECTION VENOUS BLOOD,VENIPUNCTURE    Burning sensation of feet            Relevant Medications        pregabalin (LYRICA) 100 mg capsule        Other Relevant Orders [place] HANDLG&/OR CONVEY OF SPEC FOR TR OFFICE TO LAB        COLLECTION VENOUS BLOOD,VENIPUNCTURE    Uncontrolled type 2 diabetes mellitus without complication, with long-term current use of insulin (HCC)            Relevant Medications        lisinopril (PRINIVIL, ZESTRIL) 20 mg tablet        pregabalin (LYRICA) 100 mg capsule        insulin glargine 300 unit/mL (3 mL) inpn        Other Relevant Orders        AMB POC HEMOGLOBIN A1C        [place] HANDLG&/OR CONVEY OF SPEC FOR TR OFFICE TO LAB        COLLECTION VENOUS BLOOD,VENIPUNCTURE    Screening for malignant neoplasm of breast            Relevant Orders        [facility] MAMMO BI SCREENING INCL CAD        ND HANDLG&/OR CONVEY OF SPEC FOR TR OFFICE TO LAB        COLLECTION VENOUS BLOOD,VENIPUNCTURE        Will notify patient of lab results  Encouraged patient to check fasting blood sugars daily.   F/U 3 months DM

## 2018-05-03 ENCOUNTER — TELEPHONE (OUTPATIENT)
Dept: INTERNAL MEDICINE CLINIC | Age: 63
End: 2018-05-03

## 2018-05-03 DIAGNOSIS — E07.9 THYROID DISEASE: ICD-10-CM

## 2018-05-03 RX ORDER — LEVOTHYROXINE SODIUM 175 UG/1
175 TABLET ORAL DAILY
Qty: 30 TAB | Refills: 1 | Status: SHIPPED | OUTPATIENT
Start: 2018-05-03 | End: 2018-07-15 | Stop reason: SDUPTHER

## 2018-06-08 NOTE — TELEPHONE ENCOUNTER
Future Appointments:  7/27/2018  10:15 AM   Sindy Hensley MD                Last Appointment With Me:  4/27/2018   Last Filled:    Changes Made to Medication on Last Visit:

## 2018-06-08 NOTE — TELEPHONE ENCOUNTER
Pt stated that she dropped her insulin in the floor and broke It, she stated that she is completely out off her insulin ,but couldn't remember the name of it she wanted to know if ms long could call her in come more

## 2018-06-12 DIAGNOSIS — E78.00 HIGH CHOLESTEROL: ICD-10-CM

## 2018-06-12 RX ORDER — ATORVASTATIN CALCIUM 20 MG/1
TABLET, FILM COATED ORAL
Qty: 90 TAB | Refills: 1 | Status: SHIPPED | OUTPATIENT
Start: 2018-06-12 | End: 2019-01-08 | Stop reason: SDUPTHER

## 2018-06-12 NOTE — TELEPHONE ENCOUNTER
Requested Prescriptions     Pending Prescriptions Disp Refills    atorvastatin (LIPITOR) 20 mg tablet [Pharmacy Med Name: ATORVASTATIN 20 MG TABLET] 90 Tab 1     Sig: take 1 tablet by mouth once daily     Future Appointments:  7/27/2018  10:15 AM   Mary Suárez MD                Last Appointment With Me:  4/27/2018   Last Filled:  [phone]  Changes Made to Medication on Last Visit:  None

## 2018-06-20 ENCOUNTER — TELEPHONE (OUTPATIENT)
Dept: INTERNAL MEDICINE CLINIC | Age: 63
End: 2018-06-20

## 2018-06-20 NOTE — TELEPHONE ENCOUNTER
Patient confirmed that her spouse has spoken with a rep from [facility]. Spoke with Lena Rivera and she requested a progress note that specifies her being on oxygen with a MD signature faxed to [phone].

## 2018-07-15 DIAGNOSIS — E07.9 THYROID DISEASE: ICD-10-CM

## 2018-07-18 NOTE — TELEPHONE ENCOUNTER
Last office visit:  4/27/18  Last filled:  Levothyroxine 175mcg 5/3/18 #30 x 1 refill  No changes  Follow up 7/27/18 with Dr Guillermo Rdz

## 2018-07-19 RX ORDER — LEVOTHYROXINE SODIUM 175 UG/1
TABLET ORAL
Qty: 30 TAB | Refills: 1 | Status: SHIPPED | OUTPATIENT
Start: 2018-07-19 | End: 2018-10-03 | Stop reason: SDUPTHER

## 2018-07-27 ENCOUNTER — OFFICE VISIT (OUTPATIENT)
Dept: INTERNAL MEDICINE CLINIC | Age: 63
End: 2018-07-27

## 2018-07-27 VITALS
SYSTOLIC BLOOD PRESSURE: 139 MMHG | TEMPERATURE: 98 F | OXYGEN SATURATION: 96 % | HEART RATE: 82 BPM | RESPIRATION RATE: 18 BRPM | WEIGHT: 158 LBS | BODY MASS INDEX: 23.4 KG/M2 | HEIGHT: 69 IN | DIASTOLIC BLOOD PRESSURE: 59 MMHG

## 2018-07-27 DIAGNOSIS — I89.0 LYMPHEDEMA: ICD-10-CM

## 2018-07-27 DIAGNOSIS — N39.3 STRESS INCONTINENCE OF URINE: ICD-10-CM

## 2018-07-27 DIAGNOSIS — J43.9 PULMONARY EMPHYSEMA, UNSPECIFIED EMPHYSEMA TYPE (HCC): ICD-10-CM

## 2018-07-27 DIAGNOSIS — R07.9 CHEST PAIN, UNSPECIFIED TYPE: ICD-10-CM

## 2018-07-27 LAB
BILIRUB UR QL STRIP: NEGATIVE
GLUCOSE UR-MCNC: NORMAL MG/DL
HBA1C MFR BLD HPLC: 11.6 %
KETONES P FAST UR STRIP-MCNC: NEGATIVE MG/DL
PH UR STRIP: 5 [PH] (ref 4.6–8)
PROT UR QL STRIP: NORMAL
SP GR UR STRIP: 1.02 (ref 1–1.03)
UA UROBILINOGEN AMB POC: NORMAL (ref 0.2–1)
URINALYSIS CLARITY POC: CLEAR
URINALYSIS COLOR POC: YELLOW
URINE BLOOD POC: NEGATIVE
URINE LEUKOCYTES POC: NEGATIVE
URINE NITRITES POC: NEGATIVE

## 2018-07-27 RX ORDER — FUROSEMIDE 20 MG/1
TABLET ORAL
Qty: 60 TAB | Refills: 2 | Status: SHIPPED | OUTPATIENT
Start: 2018-07-27 | End: 2019-01-08 | Stop reason: SDUPTHER

## 2018-07-27 NOTE — MR AVS SNAPSHOT
[address] [place] [address]. [address] [facility] 
[phone] Patient: Cristy Santillan MRN: [id number] Visit Information Date & Time Provider Department Dept. Phone Encounter #  
 7/27/2018 10:15 AM Drew Fox  Stefanie Brasher [id number] Follow-up Instructions Return in about 10 days (around 8/6/2018) for routine follow up. Upcoming Health Maintenance Date Due HEMOGLOBIN A1C Q6M 6/29/2018 LIPID PANEL Q1 7/10/2018 MICROALBUMIN Q1 7/19/2018 FOOT EXAM Q1 8/14/2018 BREAST CANCER SCRN MAMMOGRAM 9/30/2018* EYE EXAM RETINAL OR DILATED Q1 9/30/2018* Influenza Age 5 to Adult 8/1/2018 PAP AKA CERVICAL CYTOLOGY 9/14/2020 COLONOSCOPY 6/21/2022 DTaP/Tdap/Td series (2 - Td) 5/12/2027 *Topic was postponed. The date shown is not the original due date. Allergies as of 7/27/2018  Review Complete On: 7/27/2018 By: Christina Dean LPN No Known Allergies Current Immunizations  Reviewed on 9/14/2017 Name Date Influenza Vaccine 10/28/2016 12:00 AM, 1/29/2015 12:00 AM, 1/16/2014 12:00 AM, 11/12/2012 12:00 AM, 10/18/2012 12:00 AM, 11/17/2009 12:00 AM  
 Influenza Vaccine (Quad) PF 9/14/2017, 10/6/2015 12:00 AM  
 Pneumococcal Polysaccharide (PPSV-23) 10/18/2012 12:00 AM  
 Zoster Vaccine, Live 11/12/2012 12:00 AM  
  
 Not reviewed this visit You Were Diagnosed With   
  
 Codes Comments Uncontrolled type 2 diabetes mellitus with diabetic nephropathy, with long-term current use of insulin ([facility]    -  Primary ICD-10-CM: E11.21, E11.65, Z79.4 ICD-9-CM: 250.42, 583.81, V58.67 Pulmonary emphysema, unspecified emphysema type ([facility] [address]     ICD-10-CM: J43.9 ICD-9-CM: 492.8 Stress incontinence of urine     ICD-10-CM: N39.3 ICD-9-CM: DLP8058 Chest pain, unspecified type     ICD-10-CM: R07.9 ICD-9-CM: 786.50 Lymphedema     ICD-10-CM: I89.0 ICD-9-CM: 495.9 Vitals BP Pulse Temp Resp Height(growth percentile) Weight(growth percentile) 139/59 (BP 1 Location: Left arm, BP Patient Position: Sitting) 82 98 °F (36.7 °C) (Oral) 18 5' 9\" (1.753 m) 158 lb (71.7 kg) SpO2 BMI OB Status Smoking Status 96% 23.33 kg/m2 Postmenopausal Former Smoker Vitals History BMI and BSA Data Body Mass Index Body Surface Area  
 23.33 kg/m 2 1.87 m 2 Preferred Pharmacy Pharmacy Name Phone [facility] [address] [phone] Your Updated Medication List  
  
   
This list is accurate as of 7/27/18 11:09 AM.  Always use your most recent med list.  
  
  
  
  
 albuterol-ipratropium 2.5 mg-0.5 mg/3 ml Nebu Commonly known as:  DUO-NEB  
3 mL by Nebulization route every four (4) hours as needed. aspirin delayed-release 81 mg tablet Take 1 Tab by mouth daily. * atorvastatin 10 mg tablet Commonly known as:  LIPITOR * atorvastatin 20 mg tablet Commonly known as:  LIPITOR  
take 1 tablet by mouth once daily BD INSULIN SYRINGE ULTRA-FINE 1 mL 31 gauge x 5/16 Syrg Generic drug:  Insulin Syringe-Needle U-100  
  
 busPIRone 7.5 mg tablet Commonly known as:  BUSPAR Take 1 Tab by mouth two (2) times a day. furosemide 20 mg tablet Commonly known as:  LASIX  
1 to 2 every AM as needed  
  
 insulin detemir U-100 100 unit/mL injection Commonly known as:  LEVEMIR  
20 Units by SubCUTAneous route nightly. insulin NPH/insulin regular 100 unit/mL (70-30) injection Commonly known as:  NOVOLIN 70/30, HUMULIN 70/30  
30 Units by SubCUTAneous route Daily (before breakfast). levothyroxine 175 mcg tablet Commonly known as:  SYNTHROID  
take 1 tablet by mouth once daily  
  
 lisinopril 20 mg tablet Commonly known as:  Jed Economy Take 1 Tab by mouth daily. One Touch Delica 33 gauge Misc Generic drug:  lancets use as directed by prescriber four times a day with meals and at bedtime ONETOUCH ULTRA TEST strip Generic drug:  glucose blood VI test strips  
use as directed by prescriber four times a day with meals and at bedtime OXYGEN-AIR DELIVERY SYSTEMS  
by Does Not Apply route. Indications: Lincsilverio  
  
 pregabalin 100 mg capsule Commonly known as:  Elsie Citizen Take 1 Cap by mouth three (3) times daily. Max Daily Amount: 300 mg.  
  
 sertraline 100 mg tablet Commonly known as:  ZOLOFT Take 0.5 Tabs by mouth daily. triamcinolone acetonide 0.1 % ointment Commonly known as:  KENALOG Apply  to affected area two (2) times a day. use thin layer * Notice: This list has 2 medication(s) that are the same as other medications prescribed for you. Read the directions carefully, and ask your doctor or other care provider to review them with you. Prescriptions Sent to Pharmacy Refills  
 furosemide (LASIX) 20 mg tablet 2 Si to 2 every AM as needed Class: Normal  
 Pharmacy: [place] [address] [address] [facility] Ph #: [phone]  
 insulin NPH/insulin regular (NOVOLIN 70/30, HUMULIN 70/30) 100 unit/mL (70-30) injection 5 Si Units by SubCUTAneous route Daily (before breakfast). Class: Normal  
 Pharmacy: [place] [address] [address] [facility] Ph #: [phone] Route: SubCUTAneous  
 insulin detemir U-100 (LEVEMIR) 100 unit/mL injection 5 Si Units by SubCUTAneous route nightly. Class: Normal  
 Pharmacy: [place] [address] [address] [facility] Ph #: [phone] Route: SubCUTAneous We Performed the Following AMB POC EKG ROUTINE W/ 12 LEADS, INTER & REP [92895 CPT(R)] AMB POC HEMOGLOBIN A1C [22079 CPT(R)] AMB POC URINALYSIS DIP STICK AUTO W/ MICRO [54592 CPT(R)] MICROALBUMIN, UR, RAND W/ MICROALB/CREAT RATIO [id number] CPT(R)] SPECIMEN HANDLING, OFF->LAB [id number] CPT(R)] Follow-up Instructions Return in about 10 days (around 8/6/2018) for routine follow up. Patient Instructions Lymphedema: Care Instructions Your Care Instructions Lymphedema is fluid that builds up in the arms or legs. It is often caused by surgery to remove lymph nodes during cancer treatment, especially breast cancer surgery, which can cause fluid to build up in the arm. It can happen after radiation treatment to an area that involves lymph nodes. It also can be caused by a fractured bone or surgery to fix a fracture. And some medicines also can cause lymphedema. Some people get it for unknown reasons. Normally, lymph nodes trap bacteria and other substances as fluid flows through them. Then, the white cells in the body's defense, or immune, system can destroy the substances. But if there are few or no lymph nodes-or if the lymph system in an arm or leg has been damaged-fluid can build up in the affected arm or leg. You can take simple steps at home to help treat or prevent fluid buildup. Treatment may include raising the arm or leg to let gravity drain the fluid. You also can wear compression stockings or sleeves. Follow-up care is a key part of your treatment and safety. Be sure to make and go to all appointments, and call your doctor if you are having problems. It's also a good idea to know your test results and keep a list of the medicines you take. How can you care for yourself at home? · Wear a compression stocking or sleeve as your doctor suggests. It can help keep fluid from pooling in an arm or leg. Wear it during air travel. · Prop up the swollen arm or leg on a pillow anytime you sit or lie down. Try to keep it above the level of your heart. This will help reduce swelling. · Avoid crossing your legs if your legs are swollen. · Get some exercise on most days of the week. Increase the intensity of exercise slowly. Water aerobics can help reduce swelling by helping fluid move around. Wear your compression stocking or sleeve during exercise, but not during water exercise. · See a physical therapist. He or she can teach you how to do self-massage to help fluid move around. You also can learn what activities would be best for you. · Keep your feet clean and wear clean socks or stockings every day. Check your feet often for signs of infection, such as redness or heat. Do not walk barefoot. · If you have had lymph nodes removed from under your arm: ¨ Do not have blood drawn from the arm on the side of the lymph node surgery. ¨ Do not allow a blood pressure cuff to be placed on that arm. If you are in the hospital, make sure your nurse and other hospital staff know of your condition. ¨ Wear gloves when gardening or doing other activities that may lead to cuts on your fingers or hands. · If you have had lymph nodes removed from your groin: ¨ Bathe your feet daily in lukewarm, not hot, water. Use a mild soap that has a moisturizer, or use a moisturizer separately. ¨ Check your feet for blisters or cuts. ¨ Wear comfortable and supportive shoes that fit properly. ¨ Wear the correct size of panty hose and stockings. Avoid garters or knee-high or thigh-high stockings. · Ask your doctor how to treat any cuts, scratches, insect bites, or other injuries that may occur. · Use sunscreen and insect repellent when outdoors to protect your skin from sunburn and insect bites. · Wear medical alert jewelry that says you have lymphedema. You can buy these at most [employer]comes and on the Internet. When should you call for help? Call your doctor now or seek immediate medical care if: 
  · You have signs of infection, such as: 
¨ Increased pain, swelling, warmth, or redness. ¨ Red streaks leading from the area. ¨ Pus draining from the area. ¨ A fever.  
 Watch closely for changes in your health, and be sure to contact your doctor if: 
  · You have new or worse symptoms from lymphedema.  
  · You do not get better as expected. Where can you learn more? Go to http://chandrika-tyrone.info/. Enter V398 in the search box to learn more about \"Lymphedema: Care Instructions. \" Current as of: May 12, 2017 Content Version: 11.7 © 4390-5712 [employer]. Care instructions adapted under license by [employer] (which disclaims liability or warranty for this information). If you have questions about a medical condition or this instruction, always ask your healthcare professional. [address] any warranty or liability for your use of this information. Introducing [facility]! [employer] introduces Narrative patient portal. Now you can access parts of your medical record, email your doctor's office, and request medication refills online. 1. In your internet browser, go to [email or web address]. [employer] 2. Click on the First Time User? Click Here link in the Sign In box. You will see the New Member Sign Up page. 3. Enter your Narrative Access Code exactly as it appears below. You will not need to use this code after youve completed the sign-up process. If you do not sign up before the expiration date, you must request a new code. · Narrative Access Code: [email or web address] Expires: 10/25/2018 10:06 AM 
 
4. Enter the last four digits of your Social Security Number (xxxx) and Date of Birth (mm/dd/yyyy) as indicated and click Submit. You will be taken to the next sign-up page. 5. Create a [employer] ID. This will be your Narrative login ID and cannot be changed, so think of one that is secure and easy to remember. 6. Create a Narrative password. You can change your password at any time. 7. Enter your Password Reset Question and Answer.  This can be used at a later time if you forget your password. 8. Enter your e-mail address. You will receive e-mail notification when new information is available in [address] 9. Click Sign Up. You can now view and download portions of your medical record. 10. Click the Download Summary menu link to download a portable copy of your medical information. If you have questions, please visit the Frequently Asked Questions section of the [facility] website. Remember, [facility] is NOT to be used for urgent needs. For medical emergencies, dial 911. Now available from your iPhone and Android! Please provide this summary of care documentation to your next provider. Your primary care clinician is listed as Ino Marte. If you have any questions after today's visit, please call [phone].

## 2018-07-27 NOTE — PROGRESS NOTES
Chief Complaint Patient presents with  Leg Swelling L/R leg and feet swelling and pain I have reviewed the patient's medical history in detail and updated the computerized patient record. Health Maintenance reviewed. 1. Have you been to the ER, urgent care clinic since your last visit? Hospitalized since your last visit?no 2. Have you seen or consulted any other health care providers outside of the [address] [place] [address] since your last visit? Include any pap smears or colon screening. No 
 
Encouraged pt to discuss pt's wishes with spouse/partner/family and bring them in the next appt to follow thru with the Advanced Directive Fall Risk Assessment, last 12 mths 7/27/2018 Able to walk? Yes Fall in past 12 months? No  
 
 
PHQ over the last two weeks 7/27/2018 Little interest or pleasure in doing things Several days Feeling down, depressed, irritable, or hopeless Several days Total Score PHQ 2 2 Trouble falling or staying asleep, or sleeping too much - Feeling tired or having little energy - Poor appetite, weight loss, or overeating - Feeling bad about yourself - or that you are a failure or have let yourself or your family down - Trouble concentrating on things such as school, work, reading, or watching TV - Moving or speaking so slowly that other people could have noticed; or the opposite being so fidgety that others notice - Thoughts of being better off dead, or hurting yourself in some way -  
PHQ 9 Score - How difficult have these problems made it for you to do your work, take care of your home and get along with others - Abuse Screening Questionnaire 7/27/2018 Do you ever feel afraid of your partner? Booker Ser Are you in a relationship with someone who physically or mentally threatens you? Booker Ser Is it safe for you to go home? Y  
 
 

## 2018-07-27 NOTE — PATIENT INSTRUCTIONS
Lymphedema: Care Instructions Your Care Instructions Lymphedema is fluid that builds up in the arms or legs. It is often caused by surgery to remove lymph nodes during cancer treatment, especially breast cancer surgery, which can cause fluid to build up in the arm. It can happen after radiation treatment to an area that involves lymph nodes. It also can be caused by a fractured bone or surgery to fix a fracture. And some medicines also can cause lymphedema. Some people get it for unknown reasons. Normally, lymph nodes trap bacteria and other substances as fluid flows through them. Then, the white cells in the body's defense, or immune, system can destroy the substances. But if there are few or no lymph nodes-or if the lymph system in an arm or leg has been damaged-fluid can build up in the affected arm or leg. You can take simple steps at home to help treat or prevent fluid buildup. Treatment may include raising the arm or leg to let gravity drain the fluid. You also can wear compression stockings or sleeves. Follow-up care is a key part of your treatment and safety. Be sure to make and go to all appointments, and call your doctor if you are having problems. It's also a good idea to know your test results and keep a list of the medicines you take. How can you care for yourself at home? · Wear a compression stocking or sleeve as your doctor suggests. It can help keep fluid from pooling in an arm or leg. Wear it during air travel. · Prop up the swollen arm or leg on a pillow anytime you sit or lie down. Try to keep it above the level of your heart. This will help reduce swelling. · Avoid crossing your legs if your legs are swollen. · Get some exercise on most days of the week. Increase the intensity of exercise slowly. Water aerobics can help reduce swelling by helping fluid move around. Wear your compression stocking or sleeve during exercise, but not during water exercise.  
· See a physical therapist. He or she can teach you how to do self-massage to help fluid move around. You also can learn what activities would be best for you. · Keep your feet clean and wear clean socks or stockings every day. Check your feet often for signs of infection, such as redness or heat. Do not walk barefoot. · If you have had lymph nodes removed from under your arm: ¨ Do not have blood drawn from the arm on the side of the lymph node surgery. ¨ Do not allow a blood pressure cuff to be placed on that arm. If you are in the hospital, make sure your nurse and other hospital staff know of your condition. ¨ Wear gloves when gardening or doing other activities that may lead to cuts on your fingers or hands. · If you have had lymph nodes removed from your groin: ¨ Bathe your feet daily in lukewarm, not hot, water. Use a mild soap that has a moisturizer, or use a moisturizer separately. ¨ Check your feet for blisters or cuts. ¨ Wear comfortable and supportive shoes that fit properly. ¨ Wear the correct size of panty hose and stockings. Avoid garters or knee-high or thigh-high stockings. · Ask your doctor how to treat any cuts, scratches, insect bites, or other injuries that may occur. · Use sunscreen and insect repellent when outdoors to protect your skin from sunburn and insect bites. · Wear medical alert jewelry that says you have lymphedema. You can buy these at most drugstores and on the Internet. When should you call for help? Call your doctor now or seek immediate medical care if: 
  · You have signs of infection, such as: 
¨ Increased pain, swelling, warmth, or redness. ¨ Red streaks leading from the area. ¨ Pus draining from the area. ¨ A fever.  
 Watch closely for changes in your health, and be sure to contact your doctor if: 
  · You have new or worse symptoms from lymphedema.  
  · You do not get better as expected. Where can you learn more?  
Go to http://chandrika-tyrone.info/. Enter V398 in the search box to learn more about \"Lymphedema: Care Instructions. \" Current as of: May 12, 2017 Content Version: 11.7 © 1585-3819 [employer], [employer]. Care instructions adapted under license by [employer] (which disclaims liability or warranty for this information). If you have questions about a medical condition or this instruction, always ask your healthcare professional. [place] [address] any warranty or liability for your use of this information.

## 2018-07-27 NOTE — PROGRESS NOTES
Subjective:  
 
eJnnifer Marshall is a 57 y.o. female seen for follow-up of diabetes. She has had hypoglycemic attacks. .no but 1 time was 66 Mon AM, also 508, blood sugars running higher in the afternoon. Blood sugar control has been running high 300's 400's She has diabetes, hypertension and hyperlipidemia. Jennifer Marshall has the additional concern of not on her Lantus wasn't rf, Leg swelling x 3-4 days not on diuretic. CP comes and goes x 1 mo, not now. Shoulders ache. Diabetic Review of Systems: has noted excessive thirstiness and frequent urination, has dysesthesias in the feet. No Known Allergies Diet and Lifestyle: nonsmoker. Patient Active Problem List  
 Diagnosis Date Noted  Chronic obstructive pulmonary disease ([facility] 04/29/2018  Patient underweight 08/05/2017  At high risk for falls 08/04/2017  Bronchitis 08/03/2017  Diabetes ([facility] 08/03/2017  Tobacco dependence 08/03/2017  Depression 11/11/2016  Thyroid disease 11/11/2016  Uncontrolled type 2 diabetes mellitus with diabetic nephropathy, with long-term current use of insulin ([facility] 11/04/2016 No Known Allergies Social History Substance Use Topics  Smoking status: Former Smoker Packs/day: 2.00 Quit date: 8/3/2017  Smokeless tobacco: Never Used  Alcohol use No  
  
 
Lab Results Component Value Date/Time Hemoglobin A1c 11.3 (H) 07/10/2017 09:07 AM  
Hemoglobin A1c, External 9.5 08/04/2017 Glucose 317 (H) 04/27/2018 10:55 AM  
Glucose POC 78 08/03/2017 01:26 PM  
Microalb/Creat ratio (ug/mg creat.) 67.0 (H) 07/27/2018 10:43 AM  
MICROALBUMIN,MG/DAY Comment 07/10/2017 09:07 AM  
LDL, calculated 150 (H) 07/10/2017 09:07 AM  
Creatinine 0.89 04/27/2018 10:55 AM  
  
Lab Results Component Value Date/Time ALT (SGPT) 16 04/27/2018 10:55 AM  
AST (SGOT) 20 04/27/2018 10:55 AM  
Alk.  phosphatase 122 (H) 04/27/2018 10:55 AM  
Bilirubin, total 0.4 04/27/2018 10:55 AM  
Albumin 4.3 04/27/2018 10:55 AM  
Protein, total 6.8 04/27/2018 10:55 AM  
PLATELET 928 64/93/9269 11:38 AM  
 
 
Lab Results Component Value Date/Time GFR est non-AA 69 04/27/2018 10:55 AM  
GFR est AA 79 04/27/2018 10:55 AM  
Creatinine 0.89 04/27/2018 10:55 AM  
BUN 18 04/27/2018 10:55 AM  
Sodium 138 04/27/2018 10:55 AM  
Potassium 4.7 04/27/2018 10:55 AM  
Chloride 93 (L) 04/27/2018 10:55 AM  
CO2 31 (H) 04/27/2018 10:55 AM  
 
Lab Results Component Value Date/Time Glucose 317 (H) 04/27/2018 10:55 AM  
 Glucose POC 78 08/03/2017 01:26 PM  
   
 
Review of Systems Pertinent items are noted in HPI. Objective:  
 
Significant for the following:  
 
Visit Vitals  /59 (BP 1 Location: Left arm, BP Patient Position: Sitting)  Pulse 82  Temp 98 °F (36.7 °C) (Oral)  Resp 18  Ht 5' 9\" (1.753 m)  Wt 158 lb (71.7 kg)  SpO2 96%  BMI 23.33 kg/m2 Appearance: alert, well appearing, and in no distress. Exam: heart sounds normal rate, regular rhythm, normal S1, S2, no murmurs, rubs, clicks or gallops, chest clear Foot exam: deferred Lab review: orders written for new lab studies as appropriate; see orders. Assessment/Plan:  
 
Follow-up diabetes poorly controlled, needs further observation, patient poorly compliant. Diabetic issues reviewed with her: all medications, side effects and compliance discussed carefully, use and side effects of insulin is taught, annual eye examinations at Ophthalmology discussed and glycohemoglobin and other lab monitoring discussed. Chronic Conditions Addressed Today 1. Uncontrolled type 2 diabetes mellitus with diabetic nephropathy, with long-term current use of insulin ([address] - Primary Relevant Medications  
  furosemide (LASIX) 20 mg tablet  
  insulin NPH/insulin regular (NOVOLIN 70/30, HUMULIN 70/30) 100 unit/mL (70-30) injection  
  insulin detemir U-100 (LEVEMIR) 100 unit/mL injection Other Relevant Orders   AMB POC HEMOGLOBIN A1C (Completed) MICROALBUMIN, UR, RAND W/ MICROALB/CREAT RATIO (Completed) SPECIMEN HANDLING,DR OFF->LAB 2. Chronic obstructive pulmonary disease (HCC) Acute Diagnoses Addressed Today Stress incontinence of urine Relevant Orders AMB POC URINALYSIS DIP STICK AUTO W/ MICRO (Completed) Chest pain, unspecified type Relevant Orders AMB POC EKG ROUTINE W/ 12 LEADS, INTER & REP (Completed) Lymphedema Relevant Medications  
   
 furosemide (LASIX) 20 mg tablet EKG looks okay I do not think his chest discomfort is cardiac related, chest pain precautions given. Diabetes with both slight hypoglycemia and hyperglycemia. Given that the hypos seem to occur more in the morning, stop her evening 7030 but increase her a.m. dosage. Restart her long-acting insulin as above. Lymphedema restart diuretic, needs compression hose. See patient instructions, went over them personally with the patient. Emphasized compliance. Questions answered. Patient states that they understand the plan of action and will call if there are any issues or misunderstandings. High blood sugars worsening incontinence should improve as her blood sugars are reduced. Orders Placed This Encounter  SPECIMEN HANDLING,DR OFF->LAB  MICROALBUMIN, UR, RAND W/ MICROALB/CREAT RATIO  AMB POC HEMOGLOBIN A1C  
 AMB POC URINALYSIS DIP STICK AUTO W/ MICRO  AMB POC EKG ROUTINE W/ 12 LEADS, INTER & REP Order Specific Question:   Reason for Exam: Answer:   CP  
 furosemide (LASIX) 20 mg tablet Si to 2 every AM as needed Dispense:  60 Tab Refill:  2  
 insulin NPH/insulin regular (NOVOLIN 70/30, HUMULIN 70/30) 100 unit/mL (70-30) injection Si Units by SubCUTAneous route Daily (before breakfast). Dispense:  10 mL Refill:  5  
 insulin detemir U-100 (LEVEMIR) 100 unit/mL injection Si Units by SubCUTAneous route nightly.   
  Dispense:  1 Vial  
  Refill:  5 Current Outpatient Prescriptions Medication Sig Dispense Refill  furosemide (LASIX) 20 mg tablet 1 to 2 every AM as needed 60 Tab 2  
 insulin NPH/insulin regular (NOVOLIN 70/30, HUMULIN 70/30) 100 unit/mL (70-30) injection 30 Units by SubCUTAneous route Daily (before breakfast). 10 mL 5  
 insulin detemir U-100 (LEVEMIR) 100 unit/mL injection 20 Units by SubCUTAneous route nightly. 1 Vial 5  
 levothyroxine (SYNTHROID) 175 mcg tablet take 1 tablet by mouth once daily 30 Tab 1  
 atorvastatin (LIPITOR) 20 mg tablet take 1 tablet by mouth once daily 90 Tab 1  
 lisinopril (PRINIVIL, ZESTRIL) 20 mg tablet Take 1 Tab by mouth daily. 90 Tab 1  
 busPIRone (BUSPAR) 7.5 mg tablet Take 1 Tab by mouth two (2) times a day. 180 Tab 1  pregabalin (LYRICA) 100 mg capsule Take 1 Cap by mouth three (3) times daily. Max Daily Amount: 300 mg. 90 Cap 5  
 triamcinolone acetonide (KENALOG) 0.1 % ointment Apply  to affected area two (2) times a day. use thin layer 30 g 0  
 sertraline (ZOLOFT) 100 mg tablet Take 0.5 Tabs by mouth daily. 90 Tab 1  
 atorvastatin (LIPITOR) 10 mg tablet   0  
 OXYGEN-AIR DELIVERY SYSTEMS by Does Not Apply route. Indications: [facility]  BD INSULIN SYRINGE ULTRA-FINE 1 mL 31 gauge x 5/16 syrg  aspirin delayed-release 81 mg tablet Take 1 Tab by mouth daily. [address] 33 gauge misc use as directed by prescriber four times a day with meals and at bedtime  0  
 ONETOUCH ULTRA TEST strip use as directed by prescriber four times a day with meals and at bedtime  0  
 albuterol-ipratropium (DUO-NEB) 2.5 mg-0.5 mg/3 ml nebu 3 mL by Nebulization route every four (4) hours as needed. 30 Nebule 5 Follow-up Disposition: 
Return in about 10 days (around 8/6/2018) for routine follow up.

## 2018-07-28 LAB
ALBUMIN/CREAT UR: 67 MG/G CREAT (ref 0–30)
CREAT UR-MCNC: 123.2 MG/DL
MICROALBUMIN UR-MCNC: 82.6 UG/ML

## 2018-07-30 ENCOUNTER — TELEPHONE (OUTPATIENT)
Dept: INTERNAL MEDICINE CLINIC | Age: 63
End: 2018-07-30

## 2018-07-30 NOTE — TELEPHONE ENCOUNTER
Chief Complaint   Patient presents with    Results     MICROALBUMIN     LM that results are stable per Dr. kR Pavon.

## 2018-07-30 NOTE — TELEPHONE ENCOUNTER
----- Message from Ludwin Mims MD sent at 7/29/2018  8:17 AM EDT -----  Send normal/stable results letter. Your results are normal/stable. If not signed up, consider getting my chart to get your results on-line. We can help you to sign up.

## 2018-08-08 DIAGNOSIS — Z12.39 SCREENING FOR MALIGNANT NEOPLASM OF BREAST: ICD-10-CM

## 2018-08-14 ENCOUNTER — OFFICE VISIT (OUTPATIENT)
Dept: INTERNAL MEDICINE CLINIC | Age: 63
End: 2018-08-14

## 2018-08-14 VITALS
SYSTOLIC BLOOD PRESSURE: 144 MMHG | RESPIRATION RATE: 16 BRPM | HEIGHT: 69 IN | WEIGHT: 155 LBS | TEMPERATURE: 98.4 F | OXYGEN SATURATION: 97 % | DIASTOLIC BLOOD PRESSURE: 56 MMHG | HEART RATE: 83 BPM | BODY MASS INDEX: 22.96 KG/M2

## 2018-08-14 DIAGNOSIS — E03.9 HYPOTHYROIDISM, UNSPECIFIED TYPE: ICD-10-CM

## 2018-08-14 DIAGNOSIS — Z79.4 TYPE 2 DIABETES MELLITUS WITH DIABETIC NEUROPATHY, WITH LONG-TERM CURRENT USE OF INSULIN (HCC): ICD-10-CM

## 2018-08-14 DIAGNOSIS — E11.40 TYPE 2 DIABETES MELLITUS WITH DIABETIC NEUROPATHY, WITH LONG-TERM CURRENT USE OF INSULIN (HCC): ICD-10-CM

## 2018-08-14 DIAGNOSIS — J43.9 PULMONARY EMPHYSEMA, UNSPECIFIED EMPHYSEMA TYPE (HCC): ICD-10-CM

## 2018-08-14 NOTE — PROGRESS NOTES
Chief Complaint   Patient presents with    Diabetes     med evaluation     I have reviewed the patient's medical history in detail and updated the computerized patient record. Health Maintenance reviewed. 1. Have you been to the ER, urgent care clinic since your last visit? Hospitalized since your last visit?no    2. Have you seen or consulted any other health care providers outside of the [facility] since your last visit? Include any pap smears or colon screening. No    Encouraged pt to discuss pt's wishes with spouse/partner/family and bring them in the next appt to follow thru with the Advanced Directive    Fall Risk Assessment, last 12 mths 7/27/2018   Able to walk? Yes   Fall in past 12 months? No       PHQ over the last two weeks 8/14/2018   Little interest or pleasure in doing things Several days   Feeling down, depressed, irritable, or hopeless Several days   Total Score PHQ 2 2   Trouble falling or staying asleep, or sleeping too much -   Feeling tired or having little energy -   Poor appetite, weight loss, or overeating -   Feeling bad about yourself - or that you are a failure or have let yourself or your family down -   Trouble concentrating on things such as school, work, reading, or watching TV -   Moving or speaking so slowly that other people could have noticed; or the opposite being so fidgety that others notice -   Thoughts of being better off dead, or hurting yourself in some way -   PHQ 9 Score -   How difficult have these problems made it for you to do your work, take care of your home and get along with others -       Abuse Screening Questionnaire 8/14/2018   Do you ever feel afraid of your partner? N   Are you in a relationship with someone who physically or mentally threatens you? N   Is it safe for you to go home?  Y       ADL Assessment 8/14/2018   Feeding yourself No Help Needed   Getting from bed to chair No Help Needed   Getting dressed No Help Needed   Bathing or showering No Help Needed   Walk across the room (includes cane/walker) No Help Needed   Using the telphone No Help Needed   Taking your medications No Help Needed   Preparing meals No Help Needed   Managing money (expenses/bills) No Help Needed   Moderately strenuous housework (laundry) No Help Needed   Shopping for personal items (toiletries/medicines) No Help Needed   Shopping for groceries No Help Needed   Driving No Help Needed   Climbing a flight of stairs No Help Needed   Getting to places beyond walking distances No Help Needed

## 2018-08-14 NOTE — MR AVS SNAPSHOT
[address] [address] [facility] 
[phone] Patient: Amadeo Avila MRN: [id number] AGA:[id number] Visit Information Date & Time Provider Department Dept. Phone Encounter #  
 8/14/2018  1:45 PM Conrado Velez  Stefanie Brasher [id number] Follow-up Instructions Return in about 4 weeks (around 9/11/2018) for routine follow up. Upcoming Health Maintenance Date Due  
 LIPID PANEL Q1 7/10/2018 Influenza Age 5 to Adult 8/1/2018 EYE EXAM RETINAL OR DILATED Q1 9/30/2018* HEMOGLOBIN A1C Q6M 1/27/2019 MICROALBUMIN Q1 7/27/2019 FOOT EXAM Q1 8/14/2019 BREAST CANCER SCRN MAMMOGRAM 7/24/2020 PAP AKA CERVICAL CYTOLOGY 9/14/2020 COLONOSCOPY 6/21/2022 DTaP/Tdap/Td series (2 - Td) 5/12/2027 *Topic was postponed. The date shown is not the original due date. Allergies as of 8/14/2018  Review Complete On: 8/14/2018 By: Robert Ervin LPN No Known Allergies Current Immunizations  Reviewed on 9/14/2017 Name Date Influenza Vaccine 10/28/2016 12:00 AM, 1/29/2015 12:00 AM, 1/16/2014 12:00 AM, 11/12/2012 12:00 AM, 10/18/2012 12:00 AM, 11/17/2009 12:00 AM  
 Influenza Vaccine (Quad) PF 9/14/2017, 10/6/2015 12:00 AM  
 Pneumococcal Polysaccharide (PPSV-23) 10/18/2012 12:00 AM  
 Zoster Vaccine, Live 11/12/2012 12:00 AM  
  
 Not reviewed this visit You Were Diagnosed With   
  
 Codes Comments Uncontrolled type 2 diabetes mellitus with diabetic nephropathy, with long-term current use of insulin ([facility]    -  Primary ICD-10-CM: E11.21, E11.65, Z79.4 ICD-9-CM: 250.42, 583.81, V58.67 Pulmonary emphysema, unspecified emphysema type ([facility]     ICD-10-CM: J43.9 ICD-9-CM: 492.8 Hypothyroidism, unspecified type     ICD-10-CM: E03.9 ICD-9-CM: 571. 9 Vitals BP Pulse Temp Resp Height(growth percentile) Weight(growth percentile) 144/56 (BP 1 Location: Left arm, BP Patient Position: Sitting) 83 98.4 °F (36.9 °C) (Oral) 16 5' 9\" (1.753 m) 155 lb (70.3 kg) SpO2 BMI OB Status Smoking Status 97% 22.89 kg/m2 Postmenopausal Former Smoker Vitals History BMI and BSA Data Body Mass Index Body Surface Area  
 22.89 kg/m 2 1.85 m 2 Preferred Pharmacy Pharmacy Name Phone Bl[facility], [address] [facility] [phone] Your Updated Medication List  
  
   
This list is accurate as of 8/14/18  2:41 PM.  Always use your most recent med list.  
  
  
  
  
 albuterol-ipratropium 2.5 mg-0.5 mg/3 ml Nebu Commonly known as:  DUO-NEB  
3 mL by Nebulization route every four (4) hours as needed. aspirin delayed-release 81 mg tablet Take 1 Tab by mouth daily. * atorvastatin 10 mg tablet Commonly known as:  LIPITOR * atorvastatin 20 mg tablet Commonly known as:  LIPITOR  
take 1 tablet by mouth once daily BD INSULIN SYRINGE ULTRA-FINE 1 mL 31 gauge x 5/16 Syrg Generic drug:  Insulin Syringe-Needle U-100  
  
 busPIRone 7.5 mg tablet Commonly known as:  BUSPAR Take 1 Tab by mouth two (2) times a day. furosemide 20 mg tablet Commonly known as:  LASIX  
1 to 2 every AM as needed  
  
 insulin detemir U-100 100 unit/mL injection Commonly known as:  LEVEMIR  
10 Units by SubCUTAneous route nightly. insulin NPH/insulin regular 100 unit/mL (70-30) injection Commonly known as:  NOVOLIN 70/30, HUMULIN 70/30  
20 Units by SubCUTAneous route Daily (before breakfast). levothyroxine 175 mcg tablet Commonly known as:  SYNTHROID  
take 1 tablet by mouth once daily  
  
 lisinopril 20 mg tablet Commonly known as:  Gila Shames Take 1 Tab by mouth daily. One Touch Delica 33 gauge Misc Generic drug:  lancets  
use as directed by prescriber four times a day with meals and at bedtime ONETOUCH ULTRA TEST strip Generic drug:  glucose blood VI test strips  
use as directed by prescriber four times a day with meals and at bedtime OXYGEN-AIR DELIVERY SYSTEMS  
by Does Not Apply route. Indications: [facility]  
  
 pregabalin 100 mg capsule Commonly known as:  Evens Roselia Take 1 Cap by mouth three (3) times daily. Max Daily Amount: 300 mg.  
  
 sertraline 100 mg tablet Commonly known as:  ZOLOFT Take 0.5 Tabs by mouth daily. triamcinolone acetonide 0.1 % ointment Commonly known as:  KENALOG Apply  to affected area two (2) times a day. use thin layer * Notice: This list has 2 medication(s) that are the same as other medications prescribed for you. Read the directions carefully, and ask your doctor or other care provider to review them with you. Prescriptions Sent to Pharmacy Refills  
 insulin NPH/insulin regular (NOVOLIN 70/30, HUMULIN 70/30) 100 unit/mL (70-30) injection 5 Si Units by SubCUTAneous route Daily (before breakfast). Class: Normal  
 Pharmacy: [address] [facility] Ph #: [phone] Route: SubCUTAneous  
 insulin detemir U-100 (LEVEMIR) 100 unit/mL injection 5 Sig: 10 Units by SubCUTAneous route nightly. Class: Normal  
 Pharmacy: [address] Ph #: [phone] Route: SubCUTAneous Follow-up Instructions Return in about 4 weeks (around 2018) for routine follow up. Patient Instructions If sugars drop below 100 reduce 70/30 to 10 units in AM 
 
 
  
Introducing [facility]! Miryam Mitchell introduces [employer] patient portal. Now you can access parts of your medical record, email your doctor's office, and request medication refills online. 1. In your internet browser, go to [email or web address] Keep Your Pharmacy Open/[employer] 2. Click on the First Time User? Click Here link in the Sign In box.  You will see the New Member Sign Up page. 3. Enter your [employer] Access Code exactly as it appears below. You will not need to use this code after youve completed the sign-up process. If you do not sign up before the expiration date, you must request a new code. · [employer] Access Code: [email or web address] Expires: 10/25/2018 10:06 AM 
 
4. Enter the last four digits of your Social Security Number (xxxx) and Date of Birth (mm/dd/yyyy) as indicated and click Submit. You will be taken to the next sign-up page. 5. Create a [employer] ID. This will be your [employer] login ID and cannot be changed, so think of one that is secure and easy to remember. 6. Create a [employer] password. You can change your password at any time. 7. Enter your Password Reset Question and Answer. This can be used at a later time if you forget your password. 8. Enter your e-mail address. You will receive e-mail notification when new information is available in [address]. 9. Click Sign Up. You can now view and download portions of your medical record. 10. Click the Download Summary menu link to download a portable copy of your medical information. If you have questions, please visit the Frequently Asked Questions section of the [employer] website. Remember, [employer] is NOT to be used for urgent needs. For medical emergencies, dial 911. Now available from your iPhone and Android! Please provide this summary of care documentation to your next provider. Your primary care clinician is listed as Juan J Aaron. If you have any questions after today's visit, please call [phone].

## 2018-08-14 NOTE — PROGRESS NOTES
Subjective:     Mika Negron is a 57 y.o. female seen for follow-up of diabetes. She has had hypoglycemic attacks. .yes  Blood sugar control has been poor  She has diabetes, hypertension and hyperlipidemia. Mika Negron has the additional concern of blood sugars continue to occasionally drop, she will eat something sweet gets very high again, both hyper and hypoglycemia. She feels okay despite the large fluctuations in her sugars. Breathing is been fair, uses her oxygen. Stays off smoking. Diabetic Review of Systems: no polyuria or polydipsia, no chest pain, dyspnea or TIA's, weight has decreased, has dysesthesias in the feet. No Known Allergies    Diet and Lifestyle: follows a diabetic diet regularly, nonsmoker.     Patient Active Problem List    Diagnosis Date Noted    Type 2 diabetes mellitus with diabetic neuropathy ([facility] 08/14/2018    Chronic obstructive pulmonary disease ([facility] 04/29/2018    Patient underweight 08/05/2017    At high risk for falls 08/04/2017    Bronchitis 08/03/2017    Diabetes ([facility] 08/03/2017    Tobacco dependence 08/03/2017    Depression 11/11/2016    Thyroid disease 11/11/2016    Uncontrolled type 2 diabetes mellitus with diabetic nephropathy, with long-term current use of insulin ([facility] 11/04/2016     No Known Allergies  Social History   Substance Use Topics    Smoking status: Former Smoker     Packs/day: 2.00     Quit date: 8/3/2017    Smokeless tobacco: Never Used    Alcohol use No        Lab Results  Component Value Date/Time   WBC 8.0 09/14/2017 11:38 AM   HGB 10.9 (L) 09/14/2017 11:38 AM   HCT 34.4 09/14/2017 11:38 AM   PLATELET 272 35/62/6549 11:38 AM   MCV 98 (H) 09/14/2017 11:38 AM     Lab Results  Component Value Date/Time   Hemoglobin A1c 11.3 (H) 07/10/2017 09:07 AM   Hemoglobin A1c, External 9.5 08/04/2017   Glucose 317 (H) 04/27/2018 10:55 AM   Glucose POC 78 08/03/2017 01:26 PM   Microalb/Creat ratio (ug/mg creat.) 67.0 (H) 07/27/2018 10:43 AM   MICROALBUMIN,MG/DAY Comment 07/10/2017 09:07 AM   LDL, calculated 150 (H) 07/10/2017 09:07 AM   Creatinine 0.89 04/27/2018 10:55 AM      Lab Results  Component Value Date/Time   Cholesterol, total 292 (H) 07/10/2017 09:07 AM   HDL Cholesterol 115 07/10/2017 09:07 AM   LDL, calculated 150 (H) 07/10/2017 09:07 AM   Triglyceride 134 07/10/2017 09:07 AM     Lab Results  Component Value Date/Time   ALT (SGPT) 16 04/27/2018 10:55 AM   AST (SGOT) 20 04/27/2018 10:55 AM   Alk. phosphatase 122 (H) 04/27/2018 10:55 AM   Bilirubin, total 0.4 04/27/2018 10:55 AM   Albumin 4.3 04/27/2018 10:55 AM   Protein, total 6.8 04/27/2018 10:55 AM   PLATELET 464 80/43/4116 11:38 AM       Lab Results  Component Value Date/Time   GFR est non-AA 69 04/27/2018 10:55 AM   GFR est AA 79 04/27/2018 10:55 AM   Creatinine 0.89 04/27/2018 10:55 AM   BUN 18 04/27/2018 10:55 AM   Sodium 138 04/27/2018 10:55 AM   Potassium 4.7 04/27/2018 10:55 AM   Chloride 93 (L) 04/27/2018 10:55 AM   CO2 31 (H) 04/27/2018 10:55 AM     Lab Results   Component Value Date/Time    Glucose 317 (H) 04/27/2018 10:55 AM    Glucose POC 78 08/03/2017 01:26 PM         Review of Systems  Pertinent items are noted in HPI. Objective:     Significant for the following:     Visit Vitals    /56 (BP 1 Location: Left arm, BP Patient Position: Sitting)    Pulse 83    Temp 98.4 °F (36.9 °C) (Oral)    Resp 16    Ht 5' 9\" (1.753 m)    Wt 155 lb (70.3 kg)    SpO2 97%    BMI 22.89 kg/m2     Appearance: chronically ill appearing. On oxygen  Exam: heart sounds normal rate, regular rhythm, normal S1, S2, no murmurs, rubs, clicks or gallops, chest clear  Foot exam: Diabetic foot exam was performed. No obvious sores or red lesions. Sensation checked by monofilament exam which was normal.  Dorsalis pedis pulse waspresent.       Lab review: labs reviewed, I note that glycosylated hemoglobin 11.  6     Assessment/Plan:     Follow-up diabetes poorly controlled, needs further observation, needs improvement. Diabetic issues reviewed with her: all medications, side effects and compliance discussed carefully, annual eye examinations at Ophthalmology discussed and glycohemoglobin and other lab monitoring discussed. Chronic Conditions Addressed Today     1. Uncontrolled type 2 diabetes mellitus with diabetic nephropathy, with long-term current use of insulin ([facility]) - Primary     Relevant Medications     insulin NPH/insulin regular (NOVOLIN 70/30, HUMULIN 70/30) 100 unit/mL (70-30) injection     insulin detemir U-100 (LEVEMIR) 100 unit/mL injection    2. Chronic obstructive pulmonary disease ([facility]    3. Type 2 diabetes mellitus with diabetic neuropathy (HCC)     Relevant Medications     insulin NPH/insulin regular (NOVOLIN 70/30, HUMULIN 70/30) 100 unit/mL (70-30) injection     insulin detemir U-100 (LEVEMIR) 100 unit/mL injection      Acute Diagnoses Addressed Today     Hypothyroidism, unspecified type            Relevant Medications        insulin NPH/insulin regular (NOVOLIN 70/30, HUMULIN 70/30) 100 unit/mL (70-30) injection        insulin detemir U-100 (LEVEMIR) 100 unit/mL injection        Avoidance of hypoglycemia, reduce insulin as below. Current Outpatient Prescriptions   Medication Sig Dispense Refill    insulin NPH/insulin regular (NOVOLIN 70/30, HUMULIN 70/30) 100 unit/mL (70-30) injection 20 Units by SubCUTAneous route Daily (before breakfast). 10 mL 5    insulin detemir U-100 (LEVEMIR) 100 unit/mL injection 10 Units by SubCUTAneous route nightly. 1 Vial 5    furosemide (LASIX) 20 mg tablet 1 to 2 every AM as needed 60 Tab 2    levothyroxine (SYNTHROID) 175 mcg tablet take 1 tablet by mouth once daily 30 Tab 1    atorvastatin (LIPITOR) 20 mg tablet take 1 tablet by mouth once daily 90 Tab 1    lisinopril (PRINIVIL, ZESTRIL) 20 mg tablet Take 1 Tab by mouth daily. 90 Tab 1    busPIRone (BUSPAR) 7.5 mg tablet Take 1 Tab by mouth two (2) times a day.  180 Tab 1    pregabalin (LYRICA) 100 mg capsule Take 1 Cap by mouth three (3) times daily. Max Daily Amount: 300 mg. 90 Cap 5    triamcinolone acetonide (KENALOG) 0.1 % ointment Apply  to affected area two (2) times a day. use thin layer 30 g 0    sertraline (ZOLOFT) 100 mg tablet Take 0.5 Tabs by mouth daily. 90 Tab 1    atorvastatin (LIPITOR) 10 mg tablet   0    OXYGEN-AIR DELIVERY SYSTEMS by Does Not Apply route. Indications: [employer]      albuterol-ipratropium (DUO-NEB) 2.5 mg-0.5 mg/3 ml nebu 3 mL by Nebulization route every four (4) hours as needed. 30 Nebule 5    BD INSULIN SYRINGE ULTRA-FINE 1 mL 31 gauge x 5/16 syrg       aspirin delayed-release 81 mg tablet Take 1 Tab by mouth daily.  ONE TOUCH DELICA 33 gauge misc use as directed by prescriber four times a day with meals and at bedtime  0    ONETOUCH ULTRA TEST strip use as directed by prescriber four times a day with meals and at bedtime  0       Follow-up Disposition:  Return in about 4 weeks (around 9/11/2018) for routine follow up.

## 2018-08-23 DIAGNOSIS — F32.A DEPRESSION, UNSPECIFIED DEPRESSION TYPE: ICD-10-CM

## 2018-08-23 NOTE — TELEPHONE ENCOUNTER
Requested Prescriptions     Pending Prescriptions Disp Refills    sertraline (ZOLOFT) 100 mg tablet 90 Tab 1     Sig: Take 0.5 Tabs by mouth daily.

## 2018-08-23 NOTE — TELEPHONE ENCOUNTER
Last office visit:  8/14/18  Last filled:  Zoloft 100mg 1/2 tab daily 8/14/17 #90 X 1 refill  No changes    Follow up 9/10/18

## 2018-08-24 RX ORDER — SERTRALINE HYDROCHLORIDE 100 MG/1
50 TABLET, FILM COATED ORAL DAILY
Qty: 90 TAB | Refills: 1 | Status: SHIPPED | OUTPATIENT
Start: 2018-08-24 | End: 2018-09-21 | Stop reason: SDUPTHER

## 2018-09-21 ENCOUNTER — OFFICE VISIT (OUTPATIENT)
Dept: INTERNAL MEDICINE CLINIC | Age: 63
End: 2018-09-21

## 2018-09-21 VITALS
OXYGEN SATURATION: 97 % | DIASTOLIC BLOOD PRESSURE: 65 MMHG | HEIGHT: 69 IN | RESPIRATION RATE: 20 BRPM | WEIGHT: 146 LBS | HEART RATE: 91 BPM | TEMPERATURE: 98.2 F | SYSTOLIC BLOOD PRESSURE: 146 MMHG | BODY MASS INDEX: 21.62 KG/M2

## 2018-09-21 DIAGNOSIS — Z23 ENCOUNTER FOR IMMUNIZATION: ICD-10-CM

## 2018-09-21 DIAGNOSIS — E03.9 HYPOTHYROIDISM, UNSPECIFIED TYPE: ICD-10-CM

## 2018-09-21 DIAGNOSIS — M19.011 ARTHRITIS OF RIGHT SHOULDER REGION: ICD-10-CM

## 2018-09-21 DIAGNOSIS — F32.A DEPRESSION, UNSPECIFIED DEPRESSION TYPE: ICD-10-CM

## 2018-09-21 DIAGNOSIS — Z79.4 TYPE 2 DIABETES MELLITUS WITH DIABETIC NEUROPATHY, WITH LONG-TERM CURRENT USE OF INSULIN (HCC): Primary | ICD-10-CM

## 2018-09-21 DIAGNOSIS — E11.40 TYPE 2 DIABETES MELLITUS WITH DIABETIC NEUROPATHY, WITH LONG-TERM CURRENT USE OF INSULIN (HCC): Primary | ICD-10-CM

## 2018-09-21 DIAGNOSIS — F32.1 CURRENT MODERATE EPISODE OF MAJOR DEPRESSIVE DISORDER WITHOUT PRIOR EPISODE (HCC): ICD-10-CM

## 2018-09-21 DIAGNOSIS — J43.9 PULMONARY EMPHYSEMA, UNSPECIFIED EMPHYSEMA TYPE (HCC): ICD-10-CM

## 2018-09-21 PROBLEM — F17.200 TOBACCO DEPENDENCE: Status: RESOLVED | Noted: 2017-08-03 | Resolved: 2018-09-21

## 2018-09-21 RX ORDER — SERTRALINE HYDROCHLORIDE 100 MG/1
100 TABLET, FILM COATED ORAL DAILY
Qty: 90 TAB | Refills: 1 | Status: SHIPPED | OUTPATIENT
Start: 2018-09-21 | End: 2019-01-08 | Stop reason: SDUPTHER

## 2018-09-21 NOTE — PROGRESS NOTES
Subjective:     Justin Uriostegui is a 57 y.o. female seen for follow-up of diabetes. In the past it has been quite brittle. C/o Bi shoulde rpains. Right shoulder is the worst.  She has had hypoglycemic attacks. .no after we reduced her insulin at the last visit. Reports taking her insulin. Blood sugar control has been unkown but last A1C was 11.6. Today's blood sugar is too high to evaluate, says she had a sweet tea just before coming to the office. She has diabetes, hypertension and hyperlipidemia. Justin Uriostegui has the additional concern of been depressed some lately. Not suicidal ideation    Both shoulder hurt x 1 mointh worse x 1 week. No fall or injury. Tried motrin sl improvement. She is interested in trying a steroid injection which helped her previously. Diabetic Review of Systems: no chest pain, dyspnea or TIA's, no numbness, tingling or pain in extremities, has noted excessive thirstiness and frequent urination. No Known Allergies    Diet and Lifestyle: nonsmoker.     Patient Active Problem List    Diagnosis Date Noted    Hypothyroidism 09/21/2018    Type 2 diabetes mellitus with diabetic neuropathy ([facility] 08/14/2018    Chronic obstructive pulmonary disease ([facility] 04/29/2018    Patient underweight 08/05/2017    At high risk for falls 08/04/2017    Bronchitis 08/03/2017    Diabetes ([facility] 08/03/2017    Depression 11/11/2016    Uncontrolled type 2 diabetes mellitus with diabetic nephropathy, with long-term current use of insulin ([facility] 11/04/2016     No Known Allergies  Social History   Substance Use Topics    Smoking status: Former Smoker     Packs/day: 2.00     Quit date: 8/3/2017    Smokeless tobacco: Never Used    Alcohol use No        Lab Results  Component Value Date/Time   Hemoglobin A1c 11.3 (H) 07/10/2017 09:07 AM   Hemoglobin A1c, External 9.5 08/04/2017   Glucose 317 (H) 04/27/2018 10:55 AM   Glucose POC 78 08/03/2017 01:26 PM   Microalb/Creat ratio (ug/mg creat.) 67.0 (H) 07/27/2018 10:43 AM   MICROALBUMIN,MG/DAY Comment 07/10/2017 09:07 AM   LDL, calculated 150 (H) 07/10/2017 09:07 AM   Creatinine 0.89 04/27/2018 10:55 AM      Lab Results   Component Value Date/Time    Glucose 317 (H) 04/27/2018 10:55 AM    Glucose POC 78 08/03/2017 01:26 PM         Review of Systems  Pertinent items are noted in HPI. Objective:     Significant for the following:     Visit Vitals    /65 (BP 1 Location: Left arm, BP Patient Position: Sitting)    Pulse 91    Temp 98.2 °F (36.8 °C) (Oral)    Resp 20    Ht 5' 9\" (1.753 m)    Wt 146 lb (66.2 kg)    SpO2 97%    BMI 21.56 kg/m2     Appearance: alert, well appearing, and in no distress. Exam: heart sounds normal rate, regular rhythm, normal S1, S2, no murmurs, rubs, clicks or gallops, chest clear  Foot exam: Deferred    Lab review: orders written for new lab studies as appropriate; see orders. Assessment/Plan:     Follow-up diabetes poorly controlled, needs further observation, needs improvement, needs to follow diet more regularly. Diabetic issues reviewed with her: all medications, side effects and compliance discussed carefully, use and side effects of insulin is taught, glycohemoglobin and other lab monitoring discussed and long term diabetic complications discussed. Chronic Conditions Addressed Today     1. Uncontrolled type 2 diabetes mellitus with diabetic nephropathy, with long-term current use of insulin ([facility])     Relevant Medications     insulin detemir U-100 (LEVEMIR) 100 unit/mL injection     insulin NPH/insulin regular (NOVOLIN 70/30, HUMULIN 70/30) 100 unit/mL (70-30) injection     Other Relevant Orders     TRIAMCINOLONE ACETONIDE INJ     ADMIN INFLUENZA VIRUS VAC    2. Depression     Relevant Medications     sertraline (ZOLOFT) 100 mg tablet     Other Relevant Orders     TRIAMCINOLONE ACETONIDE INJ     ADMIN INFLUENZA VIRUS VAC     TRIAMCINOLONE ACETONIDE INJ     ADMIN INFLUENZA VIRUS VAC    3.  Chronic obstructive pulmonary disease (HCC)     Relevant Orders     TRIAMCINOLONE ACETONIDE INJ     ADMIN INFLUENZA VIRUS VAC    4. Type 2 diabetes mellitus with diabetic neuropathy (HCC) - Primary     Relevant Medications     insulin detemir U-100 (LEVEMIR) 100 unit/mL injection     insulin NPH/insulin regular (NOVOLIN 70/30, HUMULIN 70/30) 100 unit/mL (70-30) injection     sertraline (ZOLOFT) 100 mg tablet     Other Relevant Orders     AMB POC GLUCOSE BLOOD, BY GLUCOSE MONITORING DEVICE     TRIAMCINOLONE ACETONIDE INJ     ADMIN INFLUENZA VIRUS VAC    5. Hypothyroidism     Relevant Medications     insulin detemir U-100 (LEVEMIR) 100 unit/mL injection     insulin NPH/insulin regular (NOVOLIN 70/30, HUMULIN 70/30) 100 unit/mL (70-30) injection     Other Relevant Orders     TRIAMCINOLONE ACETONIDE INJ     ADMIN INFLUENZA VIRUS VAC      Acute Diagnoses Addressed Today     Encounter for immunization            Relevant Orders        INFLUENZA VACCINE INACTIVATED (IIV), SUBUNIT, ADJUVANTED, IM        TRIAMCINOLONE ACETONIDE INJ        ADMIN INFLUENZA VIRUS VAC    Arthritis of right shoulder region            Relevant Orders        DRAIN/INJECT LARGE JOINT/BURSA        TRIAMCINOLONE ACETONIDE INJ        ADMIN INFLUENZA VIRUS VAC        Options discussed. Discussed possible side affects and benefits of the procedure and/or medications. The patient agrees to undergo the procedure. Consent obtained. Sterile procedure followed. There were no complications and the procedure was well tolerated. Instructed patient to call me if it is ineffective or if there are any complications like increasing pain, redness or swelling. The right shoulder was assessed and landmarks palpated and marked. The shoulder was prepped with alcohol. Syringe was prepared with 2 cc of kenalog and 6cc of lidocaine. The shoulder was injected using a posterior approach.  Patient was notified of signs to look for in terms of post injection complications, like increasing redness or swelling or pain. Orders Placed This Encounter    DRAIN/INJECT LARGE JOINT/BURSA    INFLUENZA VACCINE INACTIVATED (IIV), SUBUNIT, ADJUVANTED, IM    AMB POC GLUCOSE BLOOD, BY GLUCOSE MONITORING DEVICE    TRIAMCINOLONE ACETONIDE INJ     Order Specific Question:   Charge Quantity? Answer:   8     Order Specific Question:   Dose     Answer:   80mg/2mls     Order Specific Question:   Site     Answer:   RIGHT BURSA     Comments:   right shoulder     Order Specific Question:   Expiration Date     Answer:   2019     Order Specific Question:   Lot#     Answer:   [id number]     Order Specific Question:        Answer:   Vann Brittle     Order Specific Question:   Perfomed by/Witnessed by: Answer:   Dr. Demetrius Suh did the procedure and [facility] obrien witnessed     Order Specific Question:   NDC#     Answer:   [address]-6    ADMIN INFLUENZA VIRUS VAC    insulin detemir U-100 (LEVEMIR) 100 unit/mL injection     Si Units by SubCUTAneous route nightly. Dispense:  2 Vial     Refill:  5    insulin NPH/insulin regular (NOVOLIN 70/30, HUMULIN 70/30) 100 unit/mL (70-30) injection     Si Units by SubCUTAneous route Daily (before breakfast). Dispense:  10 mL     Refill:  5    sertraline (ZOLOFT) 100 mg tablet     Sig: Take 1 Tab by mouth daily. Dispense:  90 Tab     Refill:  1       Increase insulin as above, watch for hypoglycemia. Increase Zoloft as above. Follow-up Disposition:  Return in about 1 week (around 2018) for routine follow up.

## 2018-09-21 NOTE — MR AVS SNAPSHOT
[address] [address] [facility] 
[phone] Patient: Chelsie John MRN: [id number] TXY:[id number] Visit Information Date & Time Provider Department Dept. Phone Encounter #  
 9/21/2018  2:45 PM Scotty Dumont MD [facility] [phone] Follow-up Instructions Return in about 1 week (around 9/28/2018) for routine follow up. Upcoming Health Maintenance Date Due  
 LIPID PANEL Q1 7/10/2018 Influenza Age 5 to Adult 8/1/2018 Bone Densitometry (Dexa) Screening 11/25/2018 EYE EXAM RETINAL OR DILATED Q1 9/30/2018* HEMOGLOBIN A1C Q6M 1/27/2019 MICROALBUMIN Q1 7/27/2019 FOOT EXAM Q1 8/14/2019 BREAST CANCER SCRN MAMMOGRAM 7/24/2020 PAP AKA CERVICAL CYTOLOGY 9/14/2020 COLONOSCOPY 6/21/2022 DTaP/Tdap/Td series (2 - Td) 5/12/2027 *Topic was postponed. The date shown is not the original due date. Allergies as of 9/21/2018  Review Complete On: 9/21/2018 By: Scotty Dumont MD  
 No Known Allergies Current Immunizations  Reviewed on 9/14/2017 Name Date Influenza Vaccine 10/28/2016 12:00 AM, 1/29/2015 12:00 AM, 1/16/2014 12:00 AM, 11/12/2012 12:00 AM, 10/18/2012 12:00 AM, 11/17/2009 12:00 AM  
 Influenza Vaccine (Quad) PF 9/14/2017, 10/6/2015 12:00 AM  
 Influenza Vaccine (Tri) Adjuvanted  Incomplete Pneumococcal Polysaccharide (PPSV-23) 10/18/2012 12:00 AM  
 Zoster Vaccine, Live 11/12/2012 12:00 AM  
  
 Not reviewed this visit You Were Diagnosed With   
  
 Codes Comments Type 2 diabetes mellitus with diabetic neuropathy, with long-term current use of insulin (HCC)    -  Primary ICD-10-CM: E11.40, Z79.4 ICD-9-CM: 250.60, 357.2, V58.67 Pulmonary emphysema, unspecified emphysema type ([facility]     ICD-10-CM: J43.9 ICD-9-CM: 492.8 Current moderate episode of major depressive disorder without prior episode ([facility]     ICD-10-CM: F32.1 ICD-9-CM: 296.22 Encounter for immunization     ICD-10-CM: Z08 ICD-9-CM: V03.89 Hypothyroidism, unspecified type     ICD-10-CM: E03.9 ICD-9-CM: 244.9 Uncontrolled type 2 diabetes mellitus with diabetic nephropathy, with long-term current use of insulin (HCC)     ICD-10-CM: E11.21, E11.65, Z79.4 ICD-9-CM: 250.42, 583.81, V58.67 Arthritis of right shoulder region     ICD-10-CM: M19.011 
ICD-9-CM: 716.91 Vitals BP Pulse Temp Resp Height(growth percentile) Weight(growth percentile) 146/65 (BP 1 Location: Left arm, BP Patient Position: Sitting) 91 98.2 °F (36.8 °C) (Oral) 20 5' 9\" (1.753 m) 146 lb (66.2 kg) SpO2 BMI OB Status Smoking Status 97% 21.56 kg/m2 Postmenopausal Former Smoker Vitals History BMI and BSA Data Body Mass Index Body Surface Area  
 21.56 kg/m 2 1.8 m 2 Preferred Pharmacy Pharmacy Name Phone [facility], [address] [facility] [phone] Your Updated Medication List  
  
   
This list is accurate as of 9/21/18  3:46 PM.  Always use your most recent med list.  
  
  
  
  
 albuterol-ipratropium 2.5 mg-0.5 mg/3 ml Nebu Commonly known as:  DUO-NEB  
3 mL by Nebulization route every four (4) hours as needed. aspirin delayed-release 81 mg tablet Take 1 Tab by mouth daily. * atorvastatin 10 mg tablet Commonly known as:  LIPITOR * atorvastatin 20 mg tablet Commonly known as:  LIPITOR  
take 1 tablet by mouth once daily BD INSULIN SYRINGE ULTRA-FINE 1 mL 31 gauge x 5/16 Syrg Generic drug:  Insulin Syringe-Needle U-100  
  
 busPIRone 7.5 mg tablet Commonly known as:  BUSPAR Take 1 Tab by mouth two (2) times a day. furosemide 20 mg tablet Commonly known as:  LASIX  
1 to 2 every AM as needed  
  
 insulin detemir U-100 100 unit/mL injection Commonly known as:  LEVEMIR  
25 Units by SubCUTAneous route nightly. insulin NPH/insulin regular 100 unit/mL (70-30) injection Commonly known as:  NOVOLIN 70/30, HUMULIN 70/30  
25 Units by SubCUTAneous route Daily (before breakfast). levothyroxine 175 mcg tablet Commonly known as:  SYNTHROID  
take 1 tablet by mouth once daily  
  
 lisinopril 20 mg tablet Commonly known as:  Lucy Pinch Take 1 Tab by mouth daily. One Touch Delica 33 gauge Misc Generic drug:  lancets  
use as directed by prescriber four times a day with meals and at bedtime ONETOUCH ULTRA TEST strip Generic drug:  glucose blood VI test strips  
use as directed by prescriber four times a day with meals and at bedtime OXYGEN-AIR DELIVERY SYSTEMS  
by Does Not Apply route. Indications: [employer]  
  
 pregabalin 100 mg capsule Commonly known as:  Larey Haroldo Take 1 Cap by mouth three (3) times daily. Max Daily Amount: 300 mg.  
  
 sertraline 100 mg tablet Commonly known as:  ZOLOFT Take 0.5 Tabs by mouth daily. triamcinolone acetonide 0.1 % ointment Commonly known as:  KENALOG Apply  to affected area two (2) times a day. use thin layer * Notice: This list has 2 medication(s) that are the same as other medications prescribed for you. Read the directions carefully, and ask your doctor or other care provider to review them with you. Prescriptions Sent to Pharmacy Refills  
 insulin detemir U-100 (LEVEMIR) 100 unit/mL injection 5 Si Units by SubCUTAneous route nightly. Class: Normal  
 Pharmacy: [place] [address] [facility] Ph #: [phone] Route: SubCUTAneous  
 insulin NPH/insulin regular (NOVOLIN 70/30, HUMULIN 70/30) 100 unit/mL (70-30) injection 5 Si Units by SubCUTAneous route Daily (before breakfast). Class: Normal  
 Pharmacy: [address] [facility] Ph #: [phone] Route: SubCUTAneous We Performed the Following AMB POC GLUCOSE BLOOD, BY GLUCOSE MONITORING DEVICE [03898 CPT(R)] DRAIN/INJECT LARGE JOINT/BURSA [id number] CPT(R)] INFLUENZA VACCINE INACTIVATED (IIV), SUBUNIT, ADJUVANTED, IM V707384 CPT(R)] Follow-up Instructions Return in about 1 week (around 9/28/2018) for routine follow up. Introducing [facility]! [employer] introduces [employer] patient portal. Now you can access parts of your medical record, email your doctor's office, and request medication refills online. 1. In your internet browser, go to [email or web address] 2. Click on the First Time User? Click Here link in the Sign In box. You will see the New Member Sign Up page. 3. Enter your [employer] Access Code exactly as it appears below. You will not need to use this code after youve completed the sign-up process. If you do not sign up before the expiration date, you must request a new code. · [employer] Access Code: [email or web address] Expires: 10/25/2018 10:06 AM 
 
4. Enter the last four digits of your Social Security Number (xxxx) and Date of Birth (mm/dd/yyyy) as indicated and click Submit. You will be taken to the next sign-up page. 5. Create a [employer] ID. This will be your [employer] login ID and cannot be changed, so think of one that is secure and easy to remember. 6. Create a [employer] password. You can change your password at any time. 7. Enter your Password Reset Question and Answer. This can be used at a later time if you forget your password. 8. Enter your e-mail address. You will receive e-mail notification when new information is available in [address] 9. Click Sign Up. You can now view and download portions of your medical record. 10. Click the Download Summary menu link to download a portable copy of your medical information.  
 
If you have questions, please visit the Frequently Asked Questions section of the [employer]. Remember, [employer] is NOT to be used for urgent needs. For medical emergencies, dial 911. Now available from your iPhone and Android! Please provide this summary of care documentation to your next provider. Your primary care clinician is listed as Michael Rocha. If you have any questions after today's visit, please call [phone].

## 2018-09-21 NOTE — PROGRESS NOTES
Chief Complaint   Patient presents with    Shoulder Pain     L/R shoulder pain x 1 month    Depression     Pt stated that her depression is worst (alot going on in the family)     I have reviewed the patient's medical history in detail and updated the computerized patient record. Health Maintenance reviewed. 1. Have you been to the ER, urgent care clinic since your last visit? Hospitalized since your last visit?no    2. Have you seen or consulted any other health care providers outside of the [address] [place] [address] since your last visit? Include any pap smears or colon screening. No      Encouraged pt to discuss pt's wishes with spouse/partner/family and bring them in the next appt to follow thru with the Advanced Directive    Fall Risk Assessment, last 12 mths 7/27/2018   Able to walk? Yes   Fall in past 12 months? No       PHQ over the last two weeks 8/14/2018   Little interest or pleasure in doing things Several days   Feeling down, depressed, irritable, or hopeless Several days   Total Score PHQ 2 2   Trouble falling or staying asleep, or sleeping too much -   Feeling tired or having little energy -   Poor appetite, weight loss, or overeating -   Feeling bad about yourself - or that you are a failure or have let yourself or your family down -   Trouble concentrating on things such as school, work, reading, or watching TV -   Moving or speaking so slowly that other people could have noticed; or the opposite being so fidgety that others notice -   Thoughts of being better off dead, or hurting yourself in some way -   PHQ 9 Score -   How difficult have these problems made it for you to do your work, take care of your home and get along with others -       Abuse Screening Questionnaire 8/14/2018   Do you ever feel afraid of your partner? N   Are you in a relationship with someone who physically or mentally threatens you? N   Is it safe for you to go home?  Y       ADL Assessment 8/14/2018   Feeding yourself No Help Needed   Getting from bed to chair No Help Needed   Getting dressed No Help Needed   Bathing or showering No Help Needed   Walk across the room (includes cane/walker) No Help Needed   Using the telphone No Help Needed   Taking your medications No Help Needed   Preparing meals No Help Needed   Managing money (expenses/bills) No Help Needed   Moderately strenuous housework (laundry) No Help Needed   Shopping for personal items (toiletries/medicines) No Help Needed   Shopping for groceries No Help Needed   Driving No Help Needed   Climbing a flight of stairs No Help Needed   Getting to places beyond walking distances No Help Needed     [facility]  OFFICE PROCEDURE PROGRESS NOTE        Chart reviewed for the following:   Aylin Thornton LPN, have reviewed the History, Physical and updated the Allergic reactions for [place] performed immediately prior to start of procedure:   Aylin Thornton LPN, have performed the following reviews on [facility] prior to the start of the procedure:            * Patient was identified by name and date of birth   * Agreement on procedure being performed was verified  * Risks and Benefits explained to the patient by Dr. Joseph Duval  * Procedure site verified and marked as necessary  * Patient was positioned for comfort  * Consent was signed and verified     Time: 3:40PM    Date of procedure: 9/21/2018    Procedure performed by:  Stephanie Foster MD    Provider assisted by: Zoran Alarcon LPN    Patient assisted by: Zoran Alarcon LPN    How tolerated by patient: Well    Post Procedural Pain Scale: 3/10    Comments: None

## 2018-09-25 LAB — GLUCOSE POC: NORMAL MG/DL

## 2018-09-26 ENCOUNTER — DOCUMENTATION ONLY (OUTPATIENT)
Dept: FAMILY MEDICINE CLINIC | Age: 63
End: 2018-09-26

## 2019-01-01 ENCOUNTER — OFFICE VISIT (OUTPATIENT)
Dept: INTERNAL MEDICINE CLINIC | Age: 64
End: 2019-01-01

## 2019-01-01 ENCOUNTER — TELEPHONE (OUTPATIENT)
Dept: INTERNAL MEDICINE CLINIC | Age: 64
End: 2019-01-01

## 2019-01-01 ENCOUNTER — DOCUMENTATION ONLY (OUTPATIENT)
Dept: INTERNAL MEDICINE CLINIC | Age: 64
End: 2019-01-01

## 2019-01-01 ENCOUNTER — HOSPITAL ENCOUNTER (OUTPATIENT)
Dept: PULMONOLOGY | Age: 64
Discharge: HOME | End: 2019-05-21
Attending: FAMILY MEDICINE
Payer: MEDICARE

## 2019-01-01 VITALS
TEMPERATURE: 97.8 F | WEIGHT: 170 LBS | HEART RATE: 94 BPM | OXYGEN SATURATION: 97 % | DIASTOLIC BLOOD PRESSURE: 86 MMHG | BODY MASS INDEX: 25.18 KG/M2 | RESPIRATION RATE: 16 BRPM | SYSTOLIC BLOOD PRESSURE: 150 MMHG | HEIGHT: 69 IN

## 2019-01-01 VITALS
HEIGHT: 69 IN | BODY MASS INDEX: 23.76 KG/M2 | HEART RATE: 106 BPM | DIASTOLIC BLOOD PRESSURE: 76 MMHG | OXYGEN SATURATION: 96 % | RESPIRATION RATE: 16 BRPM | WEIGHT: 160.4 LBS | TEMPERATURE: 98.1 F | SYSTOLIC BLOOD PRESSURE: 136 MMHG

## 2019-01-01 VITALS
HEART RATE: 100 BPM | BODY MASS INDEX: 23.85 KG/M2 | DIASTOLIC BLOOD PRESSURE: 80 MMHG | SYSTOLIC BLOOD PRESSURE: 146 MMHG | TEMPERATURE: 96.9 F | HEIGHT: 69 IN | OXYGEN SATURATION: 92 % | RESPIRATION RATE: 18 BRPM | WEIGHT: 161 LBS

## 2019-01-01 VITALS
HEART RATE: 99 BPM | SYSTOLIC BLOOD PRESSURE: 151 MMHG | HEIGHT: 69 IN | RESPIRATION RATE: 18 BRPM | OXYGEN SATURATION: 96 % | WEIGHT: 173 LBS | DIASTOLIC BLOOD PRESSURE: 75 MMHG | TEMPERATURE: 98.1 F | BODY MASS INDEX: 25.62 KG/M2

## 2019-01-01 VITALS
HEART RATE: 86 BPM | OXYGEN SATURATION: 98 % | SYSTOLIC BLOOD PRESSURE: 138 MMHG | HEIGHT: 69 IN | TEMPERATURE: 96.4 F | DIASTOLIC BLOOD PRESSURE: 81 MMHG | BODY MASS INDEX: 23.4 KG/M2 | RESPIRATION RATE: 16 BRPM | WEIGHT: 158 LBS

## 2019-01-01 VITALS
BODY MASS INDEX: 22.22 KG/M2 | HEIGHT: 69 IN | WEIGHT: 150 LBS | DIASTOLIC BLOOD PRESSURE: 83 MMHG | OXYGEN SATURATION: 95 % | HEART RATE: 106 BPM | TEMPERATURE: 98.4 F | SYSTOLIC BLOOD PRESSURE: 138 MMHG | RESPIRATION RATE: 18 BRPM

## 2019-01-01 VITALS — OXYGEN SATURATION: 99 %

## 2019-01-01 DIAGNOSIS — E03.9 HYPOTHYROIDISM, UNSPECIFIED TYPE: ICD-10-CM

## 2019-01-01 DIAGNOSIS — E11.40 TYPE 2 DIABETES MELLITUS WITH DIABETIC NEUROPATHY, WITH LONG-TERM CURRENT USE OF INSULIN (HCC): ICD-10-CM

## 2019-01-01 DIAGNOSIS — E11.40 NEUROPATHY DUE TO TYPE 2 DIABETES MELLITUS (HCC): ICD-10-CM

## 2019-01-01 DIAGNOSIS — Z79.4 TYPE 2 DIABETES MELLITUS WITH DIABETIC NEUROPATHY, WITH LONG-TERM CURRENT USE OF INSULIN (HCC): ICD-10-CM

## 2019-01-01 DIAGNOSIS — R07.9 CHEST PAIN, UNSPECIFIED TYPE: ICD-10-CM

## 2019-01-01 DIAGNOSIS — L71.9 ROSACEA: ICD-10-CM

## 2019-01-01 DIAGNOSIS — J44.9 CHRONIC OBSTRUCTIVE PULMONARY DISEASE, UNSPECIFIED COPD TYPE (HCC): ICD-10-CM

## 2019-01-01 DIAGNOSIS — F32.0 CURRENT MILD EPISODE OF MAJOR DEPRESSIVE DISORDER WITHOUT PRIOR EPISODE (HCC): ICD-10-CM

## 2019-01-01 DIAGNOSIS — Z23 ENCOUNTER FOR IMMUNIZATION: Primary | ICD-10-CM

## 2019-01-01 DIAGNOSIS — E78.00 HIGH CHOLESTEROL: ICD-10-CM

## 2019-01-01 DIAGNOSIS — M19.012 ARTHRITIS OF LEFT SHOULDER REGION: ICD-10-CM

## 2019-01-01 DIAGNOSIS — Z13.39 SCREENING FOR ALCOHOLISM: ICD-10-CM

## 2019-01-01 DIAGNOSIS — I10 ESSENTIAL HYPERTENSION: ICD-10-CM

## 2019-01-01 DIAGNOSIS — J44.9 CHRONIC OBSTRUCTIVE PULMONARY DISEASE, UNSPECIFIED COPD TYPE (HCC): Primary | ICD-10-CM

## 2019-01-01 DIAGNOSIS — E03.9 ACQUIRED HYPOTHYROIDISM: ICD-10-CM

## 2019-01-01 DIAGNOSIS — H57.12 ACUTE LEFT EYE PAIN: Primary | ICD-10-CM

## 2019-01-01 DIAGNOSIS — Z00.00 MEDICARE ANNUAL WELLNESS VISIT, SUBSEQUENT: Primary | ICD-10-CM

## 2019-01-01 DIAGNOSIS — Z13.6 SCREENING FOR ISCHEMIC HEART DISEASE: ICD-10-CM

## 2019-01-01 DIAGNOSIS — M85.9 DISORDER OF BONE DENSITY AND STRUCTURE, UNSPECIFIED: ICD-10-CM

## 2019-01-01 DIAGNOSIS — M89.9 BONE DISORDER: ICD-10-CM

## 2019-01-01 DIAGNOSIS — E07.9 THYROID DISEASE: ICD-10-CM

## 2019-01-01 DIAGNOSIS — F33.1 DEPRESSION, MAJOR, RECURRENT, MODERATE (HCC): ICD-10-CM

## 2019-01-01 DIAGNOSIS — Z13.31 SCREENING FOR DEPRESSION: ICD-10-CM

## 2019-01-01 DIAGNOSIS — M79.7 FIBROMYALGIA: ICD-10-CM

## 2019-01-01 LAB
ALBUMIN/CREAT UR: 305.2 MG/G CREAT (ref 0–30)
CREAT UR-MCNC: 89.3 MG/DL
GLUCOSE POC: 79 MG/DL
HBA1C MFR BLD HPLC: 10.5 %
HBA1C MFR BLD HPLC: 11.9 %
MICROALBUMIN UR-MCNC: 272.5 UG/ML
TSH SERPL DL<=0.005 MIU/L-ACNC: 1.6 UIU/ML (ref 0.45–4.5)

## 2019-01-01 PROCEDURE — 74011000250 HC RX REV CODE- 250: Performed by: INTERNAL MEDICINE

## 2019-01-01 PROCEDURE — 94729 DIFFUSING CAPACITY: CPT

## 2019-01-01 PROCEDURE — 94060 EVALUATION OF WHEEZING: CPT

## 2019-01-01 PROCEDURE — 94726 PLETHYSMOGRAPHY LUNG VOLUMES: CPT

## 2019-01-01 RX ORDER — FUROSEMIDE 20 MG/1
20 TABLET ORAL DAILY
Qty: 90 TAB | Refills: 1 | Status: SHIPPED | OUTPATIENT
Start: 2019-01-01 | End: 2020-01-01 | Stop reason: SDUPTHER

## 2019-01-01 RX ORDER — LANCETS
EACH MISCELLANEOUS
Qty: 100 EACH | Refills: 5 | Status: SHIPPED | OUTPATIENT
Start: 2019-01-01

## 2019-01-01 RX ORDER — METFORMIN HYDROCHLORIDE 500 MG/1
500 TABLET ORAL
Qty: 90 TAB | Refills: 1 | Status: SHIPPED | OUTPATIENT
Start: 2019-01-01 | End: 2019-01-01 | Stop reason: SINTOL

## 2019-01-01 RX ORDER — ALBUTEROL SULFATE 0.83 MG/ML
2.5 SOLUTION RESPIRATORY (INHALATION)
Status: COMPLETED | OUTPATIENT
Start: 2019-01-01 | End: 2019-01-01

## 2019-01-01 RX ORDER — DICLOFENAC SODIUM 50 MG/1
50 TABLET, DELAYED RELEASE ORAL 2 TIMES DAILY
Qty: 60 TAB | Refills: 1 | Status: SHIPPED | OUTPATIENT
Start: 2019-01-01 | End: 2019-01-01

## 2019-01-01 RX ORDER — INSULIN PUMP SYRINGE, 3 ML
EACH MISCELLANEOUS
Qty: 1 KIT | Refills: 0 | Status: SHIPPED | OUTPATIENT
Start: 2019-01-01 | End: 2019-01-01 | Stop reason: SDUPTHER

## 2019-01-01 RX ORDER — PREGABALIN 75 MG/1
75 CAPSULE ORAL 3 TIMES DAILY
Qty: 90 CAP | Refills: 1 | Status: SHIPPED | OUTPATIENT
Start: 2019-01-01 | End: 2019-01-01 | Stop reason: ALTCHOICE

## 2019-01-01 RX ORDER — VENLAFAXINE 25 MG/1
25 TABLET ORAL 3 TIMES DAILY
Qty: 90 TAB | Refills: 1 | Status: SHIPPED | OUTPATIENT
Start: 2019-01-01 | End: 2020-01-01

## 2019-01-01 RX ORDER — LEVOTHYROXINE SODIUM 175 UG/1
TABLET ORAL
Qty: 90 TAB | Refills: 3 | Status: SHIPPED | OUTPATIENT
Start: 2019-01-01 | End: 2020-01-01 | Stop reason: SDUPTHER

## 2019-01-01 RX ORDER — INSULIN HUMAN 100 [IU]/ML
INJECTION, SUSPENSION SUBCUTANEOUS
Qty: 30 ML | Refills: 3 | Status: SHIPPED | OUTPATIENT
Start: 2019-01-01 | End: 2019-01-01

## 2019-01-01 RX ORDER — INSULIN PUMP SYRINGE, 3 ML
EACH MISCELLANEOUS
Qty: 1 KIT | Refills: 0 | Status: SHIPPED | OUTPATIENT
Start: 2019-01-01

## 2019-01-01 RX ORDER — PREGABALIN 150 MG/1
150 CAPSULE ORAL 2 TIMES DAILY
Qty: 60 CAP | Refills: 1 | Status: SHIPPED | OUTPATIENT
Start: 2019-01-01 | End: 2019-01-01 | Stop reason: SDUPTHER

## 2019-01-01 RX ORDER — ATORVASTATIN CALCIUM 20 MG/1
TABLET, FILM COATED ORAL
Qty: 90 TAB | Refills: 3 | Status: SHIPPED | OUTPATIENT
Start: 2019-01-01 | End: 2020-01-01 | Stop reason: SDUPTHER

## 2019-01-01 RX ORDER — LANCETS
EACH MISCELLANEOUS
Qty: 100 EACH | Refills: 5 | Status: SHIPPED | OUTPATIENT
Start: 2019-01-01 | End: 2019-01-01 | Stop reason: SDUPTHER

## 2019-01-01 RX ORDER — PREGABALIN 150 MG/1
150 CAPSULE ORAL 2 TIMES DAILY
Qty: 60 CAP | Refills: 1 | Status: SHIPPED | OUTPATIENT
Start: 2019-01-01 | End: 2020-01-01 | Stop reason: SDUPTHER

## 2019-01-01 RX ORDER — PREGABALIN 75 MG/1
75 CAPSULE ORAL 3 TIMES DAILY
Qty: 90 CAP | Refills: 1 | Status: SHIPPED | OUTPATIENT
Start: 2019-01-01 | End: 2019-01-01 | Stop reason: SDUPTHER

## 2019-01-01 RX ORDER — PREGABALIN 25 MG/1
25 CAPSULE ORAL 3 TIMES DAILY
Qty: 90 CAP | Refills: 1 | Status: SHIPPED | OUTPATIENT
Start: 2019-01-01 | End: 2019-01-01 | Stop reason: DRUGHIGH

## 2019-01-01 RX ORDER — SULFACETAMIDE SODIUM 100 MG/ML
1 SOLUTION/ DROPS OPHTHALMIC EVERY 4 HOURS
Qty: 1 BOTTLE | Refills: 0 | Status: SHIPPED | OUTPATIENT
Start: 2019-01-01 | End: 2019-01-01

## 2019-01-01 RX ORDER — METRONIDAZOLE 10 MG/G
GEL TOPICAL DAILY
Qty: 45 G | Refills: 1 | Status: SHIPPED | OUTPATIENT
Start: 2019-01-01

## 2019-01-01 RX ADMIN — ALBUTEROL SULFATE 2.5 MG: 2.5 SOLUTION RESPIRATORY (INHALATION) at 13:41

## 2019-01-08 ENCOUNTER — OFFICE VISIT (OUTPATIENT)
Dept: INTERNAL MEDICINE CLINIC | Age: 64
End: 2019-01-08

## 2019-01-08 VITALS
HEART RATE: 86 BPM | SYSTOLIC BLOOD PRESSURE: 124 MMHG | TEMPERATURE: 98.6 F | RESPIRATION RATE: 16 BRPM | BODY MASS INDEX: 21.18 KG/M2 | WEIGHT: 143 LBS | OXYGEN SATURATION: 95 % | DIASTOLIC BLOOD PRESSURE: 78 MMHG | HEIGHT: 69 IN

## 2019-01-08 DIAGNOSIS — E78.00 HIGH CHOLESTEROL: ICD-10-CM

## 2019-01-08 DIAGNOSIS — R20.8 BURNING SENSATION OF FEET: ICD-10-CM

## 2019-01-08 DIAGNOSIS — E07.9 THYROID DISEASE: ICD-10-CM

## 2019-01-08 DIAGNOSIS — E11.40 TYPE 2 DIABETES MELLITUS WITH DIABETIC NEUROPATHY, WITH LONG-TERM CURRENT USE OF INSULIN (HCC): Primary | ICD-10-CM

## 2019-01-08 DIAGNOSIS — Z79.4 TYPE 2 DIABETES MELLITUS WITH DIABETIC NEUROPATHY, WITH LONG-TERM CURRENT USE OF INSULIN (HCC): Primary | ICD-10-CM

## 2019-01-08 DIAGNOSIS — Z23 ENCOUNTER FOR IMMUNIZATION: ICD-10-CM

## 2019-01-08 DIAGNOSIS — E08.42 DIABETIC POLYNEUROPATHY ASSOCIATED WITH DIABETES MELLITUS DUE TO UNDERLYING CONDITION (HCC): ICD-10-CM

## 2019-01-08 DIAGNOSIS — L03.119 CELLULITIS OF LOWER EXTREMITY, UNSPECIFIED LATERALITY: ICD-10-CM

## 2019-01-08 DIAGNOSIS — M25.512 LEFT SHOULDER PAIN, UNSPECIFIED CHRONICITY: ICD-10-CM

## 2019-01-08 DIAGNOSIS — F32.A DEPRESSION, UNSPECIFIED DEPRESSION TYPE: ICD-10-CM

## 2019-01-08 DIAGNOSIS — J44.9 CHRONIC OBSTRUCTIVE PULMONARY DISEASE, UNSPECIFIED COPD TYPE (HCC): ICD-10-CM

## 2019-01-08 DIAGNOSIS — Z13.220 SCREENING CHOLESTEROL LEVEL: ICD-10-CM

## 2019-01-08 DIAGNOSIS — I10 ESSENTIAL HYPERTENSION: ICD-10-CM

## 2019-01-08 RX ORDER — FUROSEMIDE 20 MG/1
TABLET ORAL
Qty: 90 TAB | Refills: 2 | Status: SHIPPED | OUTPATIENT
Start: 2019-01-08 | End: 2019-01-01

## 2019-01-08 RX ORDER — PREGABALIN 100 MG/1
100 CAPSULE ORAL 2 TIMES DAILY
Qty: 60 CAP | Refills: 1 | Status: SHIPPED | OUTPATIENT
Start: 2019-01-08 | End: 2019-01-01 | Stop reason: SINTOL

## 2019-01-08 RX ORDER — PEN NEEDLE, DIABETIC 29 G X1/2"
1 NEEDLE, DISPOSABLE MISCELLANEOUS DAILY
Qty: 100 SYRINGE | Refills: 3 | Status: SHIPPED | OUTPATIENT
Start: 2019-01-08

## 2019-01-08 RX ORDER — LISINOPRIL 20 MG/1
20 TABLET ORAL DAILY
Qty: 90 TAB | Refills: 1 | Status: SHIPPED | OUTPATIENT
Start: 2019-01-08 | End: 2019-01-01 | Stop reason: SDUPTHER

## 2019-01-08 RX ORDER — BUSPIRONE HYDROCHLORIDE 7.5 MG/1
7.5 TABLET ORAL 2 TIMES DAILY
Qty: 180 TAB | Refills: 1 | Status: SHIPPED | OUTPATIENT
Start: 2019-01-08

## 2019-01-08 RX ORDER — TRIAMCINOLONE ACETONIDE 1 MG/G
OINTMENT TOPICAL 2 TIMES DAILY
Qty: 80 G | Refills: 1 | Status: SHIPPED | OUTPATIENT
Start: 2019-01-08

## 2019-01-08 RX ORDER — IPRATROPIUM BROMIDE AND ALBUTEROL SULFATE 2.5; .5 MG/3ML; MG/3ML
3 SOLUTION RESPIRATORY (INHALATION)
Qty: 300 NEBULE | Refills: 1 | Status: SHIPPED | OUTPATIENT
Start: 2019-01-08

## 2019-01-08 RX ORDER — SERTRALINE HYDROCHLORIDE 100 MG/1
100 TABLET, FILM COATED ORAL DAILY
Qty: 90 TAB | Refills: 1 | Status: SHIPPED | OUTPATIENT
Start: 2019-01-08 | End: 2019-01-01 | Stop reason: ALTCHOICE

## 2019-01-08 RX ORDER — ATORVASTATIN CALCIUM 20 MG/1
TABLET, FILM COATED ORAL
Qty: 90 TAB | Refills: 1 | Status: SHIPPED | OUTPATIENT
Start: 2019-01-08 | End: 2019-01-01 | Stop reason: SDUPTHER

## 2019-01-08 RX ORDER — LEVOTHYROXINE SODIUM 175 UG/1
TABLET ORAL
Qty: 90 TAB | Refills: 1 | Status: SHIPPED | OUTPATIENT
Start: 2019-01-08 | End: 2019-01-01 | Stop reason: SDUPTHER

## 2019-01-08 NOTE — PROGRESS NOTES
Chief Complaint Patient presents with  Shoulder Pain L/R shoulder pain  Diabetes  
  follow up I have reviewed the patient's medical history in detail and updated the computerized patient record. Health Maintenance reviewed. 1. Have you been to the ER, urgent care clinic since your last visit? Hospitalized since your last visit?no 2. Have you seen or consulted any other health care providers outside of the [address] [place] [address] since your last visit? Include any pap smears or colon screening. No 
 
 
Encouraged pt to discuss pt's wishes with spouse/partner/family and bring them in the next appt to follow thru with the Advanced Directive Fall Risk Assessment, last 12 mths 1/8/2019 Able to walk? Yes Fall in past 12 months? No  
 
 
PHQ over the last two weeks 1/8/2019 Little interest or pleasure in doing things Several days Feeling down, depressed, irritable, or hopeless Several days Total Score PHQ 2 2 Trouble falling or staying asleep, or sleeping too much - Feeling tired or having little energy - Poor appetite, weight loss, or overeating - Feeling bad about yourself - or that you are a failure or have let yourself or your family down - Trouble concentrating on things such as school, work, reading, or watching TV - Moving or speaking so slowly that other people could have noticed; or the opposite being so fidgety that others notice - Thoughts of being better off dead, or hurting yourself in some way -  
PHQ 9 Score - How difficult have these problems made it for you to do your work, take care of your home and get along with others - Abuse Screening Questionnaire 1/8/2019 Do you ever feel afraid of your partner? Fanta Lima Are you in a relationship with someone who physically or mentally threatens you? Fanta Lima Is it safe for you to go home? Y  
 
 
ADL Assessment 1/8/2019 Feeding yourself No Help Needed Getting from bed to chair No Help Needed Getting dressed No Help Needed Bathing or showering No Help Needed Walk across the room (includes cane/walker) No Help Needed Using the telphone No Help Needed Taking your medications No Help Needed Preparing meals No Help Needed Managing money (expenses/bills) No Help Needed Moderately strenuous housework (laundry) No Help Needed Shopping for personal items (toiletries/medicines) No Help Needed Shopping for groceries No Help Needed Driving No Help Needed Climbing a flight of stairs No Help Needed Getting to places beyond walking distances No Help [facility]eded [facility] Chart reviewed for the following: 
 Heidy Engel LPN, have reviewed the History, Physical and updated the Allergic reactions for [place] TIME OUT performed immediately prior to start of procedure: 
 I, Laurita Gallegos LPN, have performed the following reviews on [place] prior to the start of the procedure: 
         
* Patient was identified by name and date of birth * Agreement on procedure being performed was verified * Risks and Benefits explained to the patient by Dr. Ascencion Del Toro * Procedure site verified and marked as necessary * Patient was positioned for comfort * Consent was signed and verified Time: 4:00PM 
 
 
Date of procedure: 1/8/2019 Procedure performed by:  Marlo Valdivia MD 
 
Provider assisted by: Junaid Hyman LPN Patient assisted by: Junaid Hyman LPN How tolerated by patient Well Post Procedural Pain Scale: 3/10 Comments: None Dr. Ascencion Del Toro did the procedure

## 2019-01-08 NOTE — PROGRESS NOTES
Subjective:  
 
Velma Engel is a 70 y.o. female seen for follow-up of diabetes. She has had hypoglycemic attacks. .no Blood sugar control has been a little hi or low, but she catches it before getting too low. She has diabetes and hyperlipidemia. Last A1C =11.6 Velma Engel has the additional concern of wants shoulder injected, notrauma Diabetic Review of Systems: no polyuria or polydipsia, no chest pain, dyspnea or TIA's, no numbness, tingling or pain in extremities. No Known Allergies Diet and Lifestyle: follows a diabetic diet regularly, nonsmoker. Patient Active Problem List  
 Diagnosis Date Noted  Hypothyroidism 09/21/2018  Type 2 diabetes mellitus with diabetic neuropathy ([facility] 08/14/2018  Chronic obstructive pulmonary disease ([facility] 04/29/2018  Patient underweight 08/05/2017  At high risk for falls 08/04/2017  Bronchitis 08/03/2017  Diabetes ([facility] 08/03/2017  Depression 11/11/2016  Uncontrolled type 2 diabetes mellitus with diabetic nephropathy, with long-term current use of insulin ([facility] 11/04/2016 Current Outpatient Medications Medication Sig Dispense Refill  pregabalin (LYRICA) 100 mg capsule Take 1 Cap by mouth two (2) times a day. Max Daily Amount: 200 mg. 60 Cap 1  
 levothyroxine (SYNTHROID) 175 mcg tablet take 1 tablet by mouth once daily 90 Tab 1  
 insulin detemir U-100 (LEVEMIR FLEXTOUCH) 100 unit/mL (3 mL) inpn 25 Units by SubCUTAneous route nightly. 15 Pen 1  
 insulin NPH/insulin regular (NOVOLIN 70/30, HUMULIN 70/30) 100 unit/mL (70-30) injection 25 Units by SubCUTAneous route Daily (before breakfast). 20 mL 1  
 sertraline (ZOLOFT) 100 mg tablet Take 1 Tab by mouth daily. 90 Tab 1  
 furosemide (LASIX) 20 mg tablet 1 to 2 every AM as needed 90 Tab 2  
 atorvastatin (LIPITOR) 20 mg tablet take 1 tablet by mouth once daily 90 Tab 1  
 lisinopril (PRINIVIL, ZESTRIL) 20 mg tablet Take 1 Tab by mouth daily.  90 Tab 1  
  busPIRone (BUSPAR) 7.5 mg tablet Take 1 Tab by mouth two (2) times a day. 180 Tab 1  
 triamcinolone acetonide (KENALOG) 0.1 % ointment Apply  to affected area two (2) times a day. use thin layer 80 g 1  
 albuterol-ipratropium (DUO-NEB) 2.5 mg-0.5 mg/3 ml nebu 3 mL by Nebulization route every four (4) hours as needed. 300 Nebule 1  
 BD INSULIN SYRINGE ULTRA-FINE 1 mL 31 gauge x 5/16 syrg 1 Each by Does Not Apply route daily. 100 Syringe 3  
 insulin NPH/insulin regular (NOVOLIN 70/30, HUMULIN 70/30) 100 unit/mL (70-30) injection 25 Units by SubCUTAneous route Daily (before breakfast). 10 mL 5  
 OXYGEN-AIR DELIVERY SYSTEMS by Does Not Apply route. Indications: [place] Mass  aspirin delayed-release 81 mg tablet Take 1 Tab by mouth daily.  ONE TOUCH DELICA 33 gauge misc use as directed by prescriber four times a day with meals and at bedtime  0  
 ONETOUCH ULTRA TEST strip use as directed by prescriber four times a day with meals and at bedtime  0 No Known Allergies Social History Tobacco Use  Smoking status: Former Smoker Packs/day: 2.00 Last attempt to quit: 8/3/2017 Years since quittin.4  Smokeless tobacco: Never Used Substance Use Topics  Alcohol use: No  
  
 
Lab Results Component Value Date/Time WBC 8.0 2017 11:38 AM  
 HGB 10.9 (L) 2017 11:38 AM  
 HCT 34.4 2017 11:38 AM  
 PLATELET 454  11:38 AM  
 MCV 98 (H) 2017 11:38 AM  
 
Lab Results Component Value Date/Time Hemoglobin A1c 13.1 (H) 2019 03:21 PM  
 Hemoglobin A1c 11.3 (H) 07/10/2017 09:07 AM  
 Hemoglobin A1c, External 9.5 2017 Glucose 100 (H) 2019 03:21 PM  
 Glucose POC [facility] 2018 08:52 AM  
 Microalb/Creat ratio (ug/mg creat.) 67.0 (H) 2018 10:43 AM  
 MICROALBUMIN,MG/DAY Comment 07/10/2017 09:07 AM  
 LDL, calculated 58 2019 03:21 PM  
 Creatinine 0.76 2019 03:21 PM  
  
Lab Results Component Value Date/Time Cholesterol, total 169 01/08/2019 03:21 PM  
 HDL Cholesterol 96 01/08/2019 03:21 PM  
 LDL, calculated 58 01/08/2019 03:21 PM  
 Triglyceride 73 01/08/2019 03:21 PM  
 
Lab Results Component Value Date/Time ALT (SGPT) 16 04/27/2018 10:55 AM  
 AST (SGOT) 20 04/27/2018 10:55 AM  
 Alk. phosphatase 122 (H) 04/27/2018 10:55 AM  
 Bilirubin, total 0.4 04/27/2018 10:55 AM  
 Albumin 4.3 04/27/2018 10:55 AM  
 Protein, total 6.8 04/27/2018 10:55 AM  
 PLATELET 144 85/53/0910 11:38 AM  
 
Lab Results Component Value Date/Time GFR est non-AA 83 01/08/2019 03:21 PM  
 GFR est AA 95 01/08/2019 03:21 PM  
 Creatinine 0.76 01/08/2019 03:21 PM  
 BUN 19 01/08/2019 03:21 PM  
 Sodium 143 01/08/2019 03:21 PM  
 Potassium 4.5 01/08/2019 03:21 PM  
 Chloride 103 01/08/2019 03:21 PM  
 CO2 25 01/08/2019 03:21 PM  
 
Lab Results Component Value Date/Time Glucose 100 (H) 01/08/2019 03:21 PM  
 Glucose POC [facility] 09/21/2018 08:52 AM  
   
 
Review of Systems Pertinent items are noted in HPI. Objective:  
 
Significant for the following:  
 
Visit Vitals /78 (BP 1 Location: Left arm, BP Patient Position: Sitting) Pulse 86 Temp 98.6 °F (37 °C) (Oral) Resp 16 Ht 5' 9\" (1.753 m) Wt 143 lb (64.9 kg) SpO2 95% BMI 21.12 kg/m² Appearance: alert, well appearing, and in no distress. Exam: heart sounds normal rate, regular rhythm, normal S1, S2, no murmurs, rubs, clicks or gallops, chest clear Foot exam: deferred Left shoulder no assymetry dec rom, +impingement signs Lab review: orders written for new lab studies as appropriate; see orders. Assessment/Plan:  
 
Follow-up diabetes control uncertain, needs further observation. Diabetic issues reviewed with her: all medications, side effects and compliance discussed carefully and glycohemoglobin and other lab monitoring discussed. Chronic Conditions Addressed Today 1. Chronic obstructive pulmonary disease (HCC) Relevant Medications albuterol-ipratropium (DUO-NEB) 2.5 mg-0.5 mg/3 ml nebu Other Relevant Orders [place] HANDLG&/OR CONVEY OF SPEC FOR TR OFFICE TO LAB  
  COLLECTION VENOUS BLOOD,VENIPUNCTURE  
  TRIAMCINOLONE ACETONIDE INJ 2. Depression Relevant Medications  
  pregabalin (LYRICA) 100 mg capsule  
  sertraline (ZOLOFT) 100 mg tablet  
  busPIRone (BUSPAR) 7.5 mg tablet Other Relevant Orders [place] HANDLG&/OR CONVEY OF SPEC FOR TR OFFICE TO LAB  
  COLLECTION VENOUS BLOOD,VENIPUNCTURE  
  TRIAMCINOLONE ACETONIDE INJ 3. Diabetes ([facility] - Primary Overview Last Assessment & Plan:  
    Hemoglobin A1c at 11.6. No further Hypoglycemia. Will continue Lantus and  sliding scale accordingly. Relevant Medications  
  pregabalin (LYRICA) 100 mg capsule  
  insulin detemir U-100 (LEVEMIR FLEXTOUCH) 100 unit/mL (3 mL) inpn  
  insulin NPH/insulin regular (NOVOLIN 70/30, HUMULIN 70/30) 100 unit/mL (70-30) injection  
  atorvastatin (LIPITOR) 20 mg tablet  
  lisinopril (PRINIVIL, ZESTRIL) 20 mg tablet Other Relevant Orders METABOLIC PANEL, BASIC (Completed) HEMOGLOBIN A1C WITH EAG (Completed) REFERRAL TO OPTOMETRY  
  FL HANDLG&/OR CONVEY OF SPEC FOR TR OFFICE TO LAB  
  COLLECTION VENOUS BLOOD,VENIPUNCTURE  
  TRIAMCINOLONE ACETONIDE INJ Acute Diagnoses Addressed Today Encounter for immunization Relevant Orders PNEUMOCOCCAL CONJ VACCINE 13 VALENT IM (Completed) [place] HANDLG&/OR CONVEY OF SPEC FOR TR OFFICE TO LAB  
   
 COLLECTION VENOUS BLOOD,VENIPUNCTURE  
   
 TRIAMCINOLONE ACETONIDE INJ Screening cholesterol level Relevant Orders LIPID PANEL (Completed) [place] HANDLG&/OR CONVEY OF SPEC FOR TR OFFICE TO LAB  
   
 COLLECTION VENOUS BLOOD,VENIPUNCTURE  
   
 TRIAMCINOLONE ACETONIDE INJ Burning sensation of feet Relevant Orders  [place] HANDLG&/OR CONVEY OF SPEC FOR TR OFFICE TO LAB  
   
 COLLECTION VENOUS BLOOD,VENIPUNCTURE  
   
 TRIAMCINOLONE ACETONIDE INJ Diabetic polyneuropathy associated with diabetes mellitus due to underlying condition ([facility] Relevant Medications  
   
 pregabalin (LYRICA) 100 mg capsule  
   
 insulin detemir U-100 (LEVEMIR FLEXTOUCH) 100 unit/mL (3 mL) inpn  
   
 insulin NPH/insulin regular (NOVOLIN 70/30, HUMULIN 70/30) 100 unit/mL (70-30) injection  
   
 sertraline (ZOLOFT) 100 mg tablet  
   
 atorvastatin (LIPITOR) 20 mg tablet  
   
 lisinopril (PRINIVIL, ZESTRIL) 20 mg tablet  
   
 busPIRone (BUSPAR) 7.5 mg tablet Other Relevant Orders [place] HANDLG&/OR CONVEY OF SPEC FOR TR OFFICE TO LAB  
   
 COLLECTION VENOUS BLOOD,VENIPUNCTURE  
   
 TRIAMCINOLONE ACETONIDE INJ Thyroid disease Relevant Medications  
   
 levothyroxine (SYNTHROID) 175 mcg tablet  
   
 insulin detemir U-100 (LEVEMIR FLEXTOUCH) 100 unit/mL (3 mL) inpn  
   
 insulin NPH/insulin regular (NOVOLIN 70/30, HUMULIN 70/30) 100 unit/mL (70-30) injection  
   
 atorvastatin (LIPITOR) 20 mg tablet Other Relevant Orders [place] HANDLG&/OR CONVEY OF SPEC FOR TR OFFICE TO LAB  
   
 COLLECTION VENOUS BLOOD,VENIPUNCTURE  
   
 TRIAMCINOLONE ACETONIDE INJ High cholesterol Relevant Medications  
   
 atorvastatin (LIPITOR) 20 mg tablet Other Relevant Orders [place] HANDLG&/OR CONVEY OF SPEC FOR TR OFFICE TO LAB  
   
 COLLECTION VENOUS BLOOD,VENIPUNCTURE  
   
 TRIAMCINOLONE ACETONIDE INJ Essential hypertension Relevant Medications  
   
 furosemide (LASIX) 20 mg tablet  
   
 atorvastatin (LIPITOR) 20 mg tablet  
   
 lisinopril (PRINIVIL, ZESTRIL) 20 mg tablet Other Relevant Orders [place] HANDLG&/OR CONVEY OF SPEC FOR TR OFFICE TO LAB  
   
 COLLECTION VENOUS BLOOD,VENIPUNCTURE  
   
 TRIAMCINOLONE ACETONIDE INJ Cellulitis of lower extremity, unspecified laterality Relevant Medications triamcinolone acetonide (KENALOG) 0.1 % ointment Other Relevant Orders [place] HANDLG&/OR CONVEY OF SPEC FOR TR OFFICE TO LAB  
   
 COLLECTION VENOUS BLOOD,VENIPUNCTURE  
   
 TRIAMCINOLONE ACETONIDE INJ Left shoulder pain, unspecified chronicity Relevant Orders DRAIN/INJECT LARGE JOINT/BURSA TRIAMCINOLONE ACETONIDE INJ Options discussed. Discussed possible side affects and benefits of the procedure and/or medications. The patient agrees to undergo the procedure. Consent obtained. Sterile procedure followed. There were no complications and the procedure was well tolerated. Instructed patient to call me if it is ineffective or if there are any complications like increasing pain, redness or swelling. The left shoulder was assessed and landmarks palpated and marked. The shoulder was prepped with alcohol. Syringe was prepared with 2 cc of kenalog and 6cc of lidocaine. The shoulder was injected using a posterior approach. Patient was notified of signs to look for in terms of post injection complications, like increasing redness or swelling or pain. Orders Placed This Encounter  DRAIN/INJECT LARGE JOINT/BURSA  PNEUMOCOCCAL CONJ VACCINE 13 VALENT IM  
 METABOLIC PANEL, BASIC  
 HEMOGLOBIN A1C WITH EAG  
 LIPID PANEL  
 CVD REPORT  DIABETES PATIENT EDUCATION  
 REFERRAL TO OPTOMETRY Referral Priority:   Routine Referral Type:   Consultation Referral Reason:   Specialty Services Required Referred to Provider:   Tiffany Birmingham OD  
 [place] HANDLG&/OR CONVEY OF SPEC FOR TR OFFICE TO LAB  COLLECTION VENOUS BLOOD,VENIPUNCTURE  
 TRIAMCINOLONE ACETONIDE INJ Order Specific Question:   Charge Quantity? Answer:   8 Order Specific Question:   Dose Answer:   80mg/2mls Order Specific Question:   Site Answer:   LEFT BURSA Comments:   L/shoulder Order Specific Question:   Expiration Date Answer:   4/30/2020 Order Specific Question:   Lot# Answer:   [id number] Order Specific Question:    Answer:   amneal  
  Order Specific Question:   Perfomed by/Witnessed by: Answer:   Dr. Samuel Leary did the procedure and rajiv Bauer witnessed Order Specific Question:   NDC# Answer:   [address]-7 [[id number]]  pregabalin (LYRICA) 100 mg capsule Sig: Take 1 Cap by mouth two (2) times a day. Max Daily Amount: 200 mg. Dispense:  60 Cap Refill:  1  
 levothyroxine (SYNTHROID) 175 mcg tablet Sig: take 1 tablet by mouth once daily Dispense:  90 Tab Refill:  1  insulin detemir U-100 (LEVEMIR FLEXTOUCH) 100 unit/mL (3 mL) inpn Si Units by SubCUTAneous route nightly. Dispense:  15 Pen Refill:  1  insulin NPH/insulin regular (NOVOLIN 70/30, HUMULIN 70/30) 100 unit/mL (70-30) injection Si Units by SubCUTAneous route Daily (before breakfast). Dispense:  20 mL Refill:  1  
 sertraline (ZOLOFT) 100 mg tablet Sig: Take 1 Tab by mouth daily. Dispense:  90 Tab Refill:  1  
 furosemide (LASIX) 20 mg tablet Si to 2 every AM as needed Dispense:  90 Tab Refill:  2  
 atorvastatin (LIPITOR) 20 mg tablet Sig: take 1 tablet by mouth once daily Dispense:  90 Tab Refill:  1  
 lisinopril (PRINIVIL, ZESTRIL) 20 mg tablet Sig: Take 1 Tab by mouth daily. Dispense:  90 Tab Refill:  1  
 busPIRone (BUSPAR) 7.5 mg tablet Sig: Take 1 Tab by mouth two (2) times a day. Dispense:  180 Tab Refill:  1  
 triamcinolone acetonide (KENALOG) 0.1 % ointment Sig: Apply  to affected area two (2) times a day. use thin layer Dispense:  80 g Refill:  1  
 albuterol-ipratropium (DUO-NEB) 2.5 mg-0.5 mg/3 ml nebu Sig: 3 mL by Nebulization route every four (4) hours as needed. Dispense:  300 Nebule Refill:  1  
 BD INSULIN SYRINGE ULTRA-FINE 1 mL 31 gauge x 5/16 syrg Si Each by Does Not Apply route daily. Dispense:  100 Syringe Refill:  3 Follow-up Disposition: 
Return in about 4 weeks (around 2019) for routine follow up.

## 2019-01-09 LAB
BUN SERPL-MCNC: 19 MG/DL (ref 8–27)
BUN/CREAT SERPL: 25 (ref 12–28)
CALCIUM SERPL-MCNC: 9.6 MG/DL (ref 8.7–10.3)
CHLORIDE SERPL-SCNC: 103 MMOL/L (ref 96–106)
CHOLEST SERPL-MCNC: 169 MG/DL (ref 100–199)
CO2 SERPL-SCNC: 25 MMOL/L (ref 20–29)
CREAT SERPL-MCNC: 0.76 MG/DL (ref 0.57–1)
EST. AVERAGE GLUCOSE BLD GHB EST-MCNC: 329 MG/DL
GLUCOSE SERPL-MCNC: 100 MG/DL (ref 65–99)
HBA1C MFR BLD: 13.1 % (ref 4.8–5.6)
HDLC SERPL-MCNC: 96 MG/DL
INTERPRETATION, 910389: NORMAL
LDLC SERPL CALC-MCNC: 58 MG/DL (ref 0–99)
Lab: NORMAL
POTASSIUM SERPL-SCNC: 4.5 MMOL/L (ref 3.5–5.2)
SODIUM SERPL-SCNC: 143 MMOL/L (ref 134–144)
TRIGL SERPL-MCNC: 73 MG/DL (ref 0–149)
VLDLC SERPL CALC-MCNC: 15 MG/DL (ref 5–40)

## 2019-01-11 ENCOUNTER — TELEPHONE (OUTPATIENT)
Dept: INTERNAL MEDICINE CLINIC | Age: 64
End: 2019-01-11

## 2019-01-11 RX ORDER — INSULIN GLARGINE 100 [IU]/ML
30 INJECTION, SOLUTION SUBCUTANEOUS
Qty: 5 PEN | Refills: 5 | Status: SHIPPED | OUTPATIENT
Start: 2019-01-11 | End: 2019-01-01 | Stop reason: ALTCHOICE

## 2019-01-11 NOTE — TELEPHONE ENCOUNTER
Called A1C high insulin changed dose. See orders. Send results letter as below:  Your recent lab showed the following abnomality A1C of 13. .  We need you to return to re-check the laboratory value again in 2 weeks. Change insulin. Lantus 30 units at night. Humulin 30 units twice daily. Lavada Saucer

## 2019-04-12 PROBLEM — E11.9 DIABETES (HCC): Status: RESOLVED | Noted: 2017-08-03 | Resolved: 2019-01-01

## 2019-04-12 NOTE — PROGRESS NOTES
Chief Complaint Patient presents with [facility] Annual Wellness Visit I have reviewed the patient's medical history in detail and updated the computerized patient record. Health Maintenance reviewed. 1. Have you been to the ER, urgent care clinic since your last visit? Hospitalized since your last visit?no 2. Have you seen or consulted any other health care providers outside of the [address] [place] [address] since your last visit? Include any pap smears or colon screening. No 
 
 
Encouraged pt to discuss pt's wishes with spouse/partner/family and bring them in the next appt to follow thru with the Advanced Directive Fall Risk Assessment, last 12 mths 4/12/2019 Able to walk? Yes Fall in past 12 months? No  
 
 
3 most recent PHQ Screens 4/12/2019 Little interest or pleasure in doing things Several days Feeling down, depressed, irritable, or hopeless Nearly every day Total Score PHQ 2 4 Trouble falling or staying asleep, or sleeping too much - Feeling tired or having little energy - Poor appetite, weight loss, or overeating - Feeling bad about yourself - or that you are a failure or have let yourself or your family down - Trouble concentrating on things such as school, work, reading, or watching TV - Moving or speaking so slowly that other people could have noticed; or the opposite being so fidgety that others notice - Thoughts of being better off dead, or hurting yourself in some way -  
PHQ 9 Score - How difficult have these problems made it for you to do your work, take care of your home and get along with others - Abuse Screening Questionnaire 4/12/2019 Do you ever feel afraid of your partner? Chandrakant Robledo Are you in a relationship with someone who physically or mentally threatens you? Chandrakant Robledo Is it safe for you to go home? Y  
 
 
 
 
====Carmelina Toledo Invitation==== 
 
Patient was invited to [facility] on this date and given the information folder for review. Recommended appointment with Carmelina Toledo facilitator for ACP conversation regarding advance directives. [x] Yes  [] No  Referral sent to [facility] team member or Coordinator for follow-up 
 
[] Yes  [x] No  Patient scheduled an appointment.     
 
Site of Referral: [facility]

## 2019-04-12 NOTE — PROGRESS NOTES
This is the Subsequent Medicare Annual Wellness Exam, performed 12 months or more after the Initial AWV or the last Subsequent AWV I have reviewed the patient's medical history in detail and updated the computerized patient record. History Notes easily SOB if more exertional, uses her O2, occasional leaves it off. She is reduced her insulin some, still occasionally blood sugars run a little low. Last A1c was quite high at 13. See med list. 
Minimal complaints of urine frequency She has noted some weight gain. Reports taking thyroid pill daily. Previous weight gain with low thyroid. Energy okay. Past Medical History:  
Diagnosis Date  Bronchitis  COPD (chronic obstructive pulmonary disease) (HCC)  Depression   
 x 1 year  Diabetes ([facility] [address] > 15 years  DKA (diabetic ketoacidoses) ([facility] [address]   
 10/29/ hospitalization  Generalized pain  High cholesterol  Thyroid disease Thyroid removed. Past Surgical History:  
Procedure Laterality Date  ENDOMETRIAL CRYOABLATION To stop mentrual bleeding  HX THYROIDECTOMY 2013/ part of thyroid removed early 80's  HX TUBAL LIGATION Current Outpatient Medications Medication Sig Dispense Refill  insulin NPH/insulin regular (NOVOLIN 70/30, HUMULIN 70/30) 100 unit/mL (70-30) injection 15 Units by SubCUTAneous route Before breakfast and dinner. (Patient taking differently: 25 Units by SubCUTAneous route Daily (before breakfast). ) 10 mL 5  
 levothyroxine (SYNTHROID) 175 mcg tablet take 1 tablet by mouth once daily 90 Tab 1  
 insulin detemir U-100 (LEVEMIR FLEXTOUCH) 100 unit/mL (3 mL) inpn 25 Units by SubCUTAneous route nightly. 15 Pen 1  
 sertraline (ZOLOFT) 100 mg tablet Take 1 Tab by mouth daily.  90 Tab 1  
 furosemide (LASIX) 20 mg tablet 1 to 2 every AM as needed 90 Tab 2  
 atorvastatin (LIPITOR) 20 mg tablet take 1 tablet by mouth once daily 90 Tab 1  
  lisinopril (PRINIVIL, ZESTRIL) 20 mg tablet Take 1 Tab by mouth daily. 90 Tab 1  
 busPIRone (BUSPAR) 7.5 mg tablet Take 1 Tab by mouth two (2) times a day. 180 Tab 1  
 triamcinolone acetonide (KENALOG) 0.1 % ointment Apply  to affected area two (2) times a day. use thin layer 80 g 1  
 albuterol-ipratropium (DUO-NEB) 2.5 mg-0.5 mg/3 ml nebu 3 mL by Nebulization route every four (4) hours as needed. 300 Nebule 1  
 BD INSULIN SYRINGE ULTRA-FINE 1 mL 31 gauge x 5/16 syrg 1 Each by Does Not Apply route daily. 100 Syringe 3  
 OXYGEN-AIR DELIVERY SYSTEMS by Does Not Apply route. Indications: Lesia Carty  aspirin delayed-release 81 mg tablet Take 1 Tab by mouth daily.  ONE TOUCH DELICA 33 gauge misc use as directed by prescriber four times a day with meals and at bedtime  0  
 ONETOUCH ULTRA TEST strip use as directed by prescriber four times a day with meals and at bedtime  0 No Known Allergies Family History Problem Relation Age of Onset  Stroke Mother 68  
 No Known Problems Father POld Age  No Known Problems Brother  Hypertension Brother  Alcohol abuse Brother  Diabetes Maternal Grandmother  Breast Problems Paternal Grandmother Social History Tobacco Use  Smoking status: Former Smoker Packs/day: 2.00 Last attempt to quit: 8/3/2017 Years since quittin.6  Smokeless tobacco: Never Used Substance Use Topics  Alcohol use: No  
 
Patient Active Problem List  
Diagnosis Code  Uncontrolled type 2 diabetes mellitus with diabetic nephropathy, with long-term current use of insulin ([facility]) E11.21, E11.65, Z79.4  Depression F32.9  At high risk for falls Z91.81  Bronchitis J40  
 Diabetes ([facility] [address] E11.9  Patient underweight R63.6  Chronic obstructive pulmonary disease ([facility] [address] J44.9  Type 2 diabetes mellitus with diabetic neuropathy ([facility]) E11.40  Hypothyroidism E03.9 Depression Risk Factor Screening: 3 most recent PHQ Screens 4/12/2019 Little interest or pleasure in doing things Several days Feeling down, depressed, irritable, or hopeless Nearly every day Total Score PHQ 2 4 Trouble falling or staying asleep, or sleeping too much - Feeling tired or having little energy - Poor appetite, weight loss, or overeating - Feeling bad about yourself - or that you are a failure or have let yourself or your family down - Trouble concentrating on things such as school, work, reading, or watching TV - Moving or speaking so slowly that other people could have noticed; or the opposite being so fidgety that others notice - Thoughts of being better off dead, or hurting yourself in some way -  
PHQ 9 Score - How difficult have these problems made it for you to do your work, take care of your home and get along with others - Alcohol Risk Factor Screening: You do not drink alcohol or very rarely. Functional Ability and Level of Safety:  
Hearing Loss Hearing is good. Activities of Daily Living The home contains: no safety equipment. Patient needs help with:  housework, bathing and hygiene Fall Risk Fall Risk Assessment, last 12 mths 4/12/2019 Able to walk? Yes Fall in past 12 months? No  
 
 
Abuse Screen Patient is not abused Cognitive Screening Evaluation of Cognitive Function: 
Has your family/caregiver stated any concerns about your memory: no 
Normal, Clock Drawing test 
 
Patient Care Team  
Patient Care Team: 
Veronica Castillo MD as PCP - [facility]) Assessment/Plan Education and counseling provided: 
Are appropriate based on today's review and evaluation Bone mass measurement (DEXA) Chronic Conditions Addressed Today 1. Hypothyroidism Relevant Orders TSH 3RD GENERATION 2. RESOLVED: Diabetes ([address] - Primary Overview Last Assessment & Plan:  
    Hemoglobin A1c at 11.6. No further Hypoglycemia.   Will continue Lantus and  sliding scale accordingly. Relevant Orders AMB POC HEMOGLOBIN A1C (Completed) 3. COPD (chronic obstructive pulmonary disease) ([facility] [address] Relevant Orders AMB SUPPLY ORDER Acute Diagnoses Addressed Today Medicare annual wellness visit, subsequent Screening for alcoholism Relevant Orders [place] ANNUAL ALCOHOL SCREEN 15 MIN Screening for depression Relevant Orders [facility] Screening for ischemic heart disease Disorder of bone density and structure, unspecified Bone disorder Relevant Orders DEXA BONE DENSITY STUDY AXIAL  
  
 
  ICD-10-CM ICD-9-CM 1. Type 2 diabetes mellitus with diabetic neuropathy, with long-term current use of insulin (HCC) E11.40 250.60 AMB POC HEMOGLOBIN A1C  
 Z79.4 357.2 CANCELED: METABOLIC PANEL, BASIC V58.67 2. Medicare annual wellness visit, subsequent Z00.00 V70.0 3. Screening for alcoholism Z13.39 V79.1 [place] ANNUAL ALCOHOL SCREEN 15 MIN 4. Screening for depression Z13.31 V79.0 [facility] 5. Screening for ischemic heart disease Z13.6 V81.0 CANCELED: LIPID PANEL 6. Chronic obstructive pulmonary disease, unspecified COPD type (HCC) J44.9 496 AMB SUPPLY ORDER  
7. Disorder of bone density and structure, unspecified M85.9 733.90   
8. Bone disorder M89.9 733.90 DEXA BONE DENSITY STUDY AXIAL 9. Acquired hypothyroidism E03.9 244.9 TSH 3RD GENERATION Orders Placed This Encounter  Depression Screen Annual  
 AMB SUPPLY ORDER Cardiopulmonary rehab at [facility] 
[phone]  DEXA BONE DENSITY STUDY AXIAL Standing Status:   Future Standing Expiration Date:   5/13/2020 Scheduling Instructions:  
   [place] Order Specific Question:   Reason for Exam  
  Answer:   screening  TSH 3RD GENERATION  
 AMB POC HEMOGLOBIN A1C  Annual  Alcohol Screen 15 min () Health Maintenance Due Topic Date Due  
  Shingrix Vaccine Age 50> (1 of 2) 11/25/2003  Bone Densitometry (Dexa) Screening  11/25/2018  MEDICARE YEARLY EXAM  01/08/2019 Follow-up and Dispositions · Return in about 2 months (around 6/12/2019) for routine follow up.

## 2019-04-12 NOTE — PATIENT INSTRUCTIONS
Medicare Wellness Visit, Female The best way to live healthy is to have a lifestyle where you eat a well-balanced diet, exercise regularly, limit alcohol use, and quit all forms of tobacco/nicotine, if applicable. Regular preventive services are another way to keep healthy. Preventive services (vaccines, screening tests, monitoring & exams) can help personalize your care plan, which helps you manage your own care. Screening tests can find health problems at the earliest stages, when they are easiest to treat. [facility] follows the current, evidence-based guidelines published by the [facility] ([facility]) when recommending preventive services for our patients. Because we follow these guidelines, sometimes recommendations change over time as research supports it. (For example, mammograms used to be recommended annually. Even though Medicare will still pay for an annual mammogram, the newer guidelines recommend a mammogram every two years for women of average risk.) Of course, you and your doctor may decide to screen more often for some diseases, based on your risk and your health status. Preventive services for you include: - Medicare offers their members a free annual wellness visit, which is time for you and your primary care provider to discuss and plan for your preventive service needs. Take advantage of this benefit every year! 
-All adults over the age of 72 should receive the recommended pneumonia vaccines. Current USPSTF guidelines recommend a series of two vaccines for the best pneumonia protection.  
-All adults should have a flu vaccine yearly and a tetanus vaccine every 10 years. All adults age 61 and older should receive a shingles vaccine once in their lifetime.   
-A bone mass density test is recommended when a woman turns 65 to screen for osteoporosis. This test is only recommended one time, as a screening. Some providers will use this same test as a disease monitoring tool if you already have osteoporosis. -All adults age 38-68 who are overweight should have a diabetes screening test once every three years.  
-Other screening tests and preventive services for persons with diabetes include: an eye exam to screen for diabetic retinopathy, a kidney function test, a foot exam, and stricter control over your cholesterol.  
-Cardiovascular screening for adults with routine risk involves an electrocardiogram (ECG) at intervals determined by your doctor.  
-Colorectal cancer screenings should be done for adults age 54-65 with no increased risk factors for colorectal cancer. There are a number of acceptable methods of screening for this type of cancer. Each test has its own benefits and drawbacks. Discuss with your doctor what is most appropriate for you during your annual wellness visit. The different tests include: colonoscopy (considered the best screening method), a fecal occult blood test, a fecal DNA test, and sigmoidoscopy. -Breast cancer screenings are recommended every other year for women of normal risk, age 54-69. 
-Cervical cancer screenings for women over age 72 are only recommended with certain risk factors.  
-All adults born between [facility] should be screened once for Hepatitis C. Here is a list of your current Health Maintenance items (your personalized list of preventive services) with a due date: 
Health Maintenance Due Topic Date Due  Shingles Vaccine (1 of 2) 11/25/2003  Bone Mineral Density   11/25/2018 [facility] Annual Well Visit  01/08/2019

## 2019-04-12 NOTE — ACP (ADVANCE CARE PLANNING)
====Carmelina [place] Invitation==== 
 
Patient was invited to [facility] on this date and given the information folder for review. Recommended appointment with [facility] facilitator for ACP conversation regarding advance directives. [x] Yes  [] No  Referral sent to [facility] team member or Coordinator for follow-up 
 
[] Yes  [x] No  Patient scheduled an appointment.     
 
Site of Referral:[facility]

## 2019-04-15 NOTE — PROGRESS NOTES
Your diabetes is not as good as we like but it's better. Please continue your current medications and vigorously follow your diabetic diet. No change in your medications for now. Thyroid level now good.

## 2019-05-07 NOTE — TELEPHONE ENCOUNTER
Iftikhar Arnold, from [id number] [facility], called in reference to pt needing pft done before an appt can made. Please fax pts order, office notes, labs, and physical to [phone]. If you have any questions she can be reached at [phone].

## 2019-05-08 NOTE — TELEPHONE ENCOUNTER
Faxed requested records and new order for PFTs to Lavinia Khan, as requested by Dr Liliane Quevedo, [facility]  6/1/8783  1:27 PM

## 2019-05-15 NOTE — PROGRESS NOTES
HISTORY OF PRESENT ILLNESS Yahaira Floyd is a 70 y.o. female. Eye Pain The history is provided by the patient. This is a new problem. The current episode started 3 to 5 hours ago (woke up with pain this AM). The problem occurs constantly. The problem has been gradually worsening. The left eye is affected. The injury mechanism was none. There is no history of trauma to the eye. There is no known exposure to pink eye. She does not wear contacts. Associated symptoms include foreign body sensation, photophobia and pain. Pertinent negatives include no discharge and no double vision. She is also complaining of pain in the left arm upper portion shoulder to elbow. Symptoms x2 months, no specific injury. Has tried some arthritis medicines with some improvement. Blood sugars have been okay. No hypoglycemia, takes her insulin. Hemoglobin A1c is gone from 13.1 to the recent one of 10.5. Here with her relative. Continues her oxygen. Moods been okay. Patient Active Problem List  
Diagnosis Code  Uncontrolled type 2 diabetes mellitus with diabetic nephropathy, with long-term current use of insulin ([facility]) E11.21, E11.65, Z79.4  Depression F32.9  At high risk for falls Z91.81  Bronchitis J40  
 COPD (chronic obstructive pulmonary disease) ([facility] [address] J44.9  Type 2 diabetes mellitus with diabetic neuropathy ([facility]) E11.40  Hypothyroidism E03.9 Review of Systems Eyes: Positive for photophobia and pain. Negative for double vision and discharge. Respiratory: Negative for shortness of breath. Cardiovascular: Negative for chest pain. Genitourinary: Negative for frequency. Psychiatric/Behavioral: Negative for depression. Physical Exam 
Visit Vitals /81 Pulse 86 Temp 96.4 °F (35.8 °C) (Temporal) Resp 16 Ht 5' 9\" (1.753 m) Wt 158 lb (71.7 kg) SpO2 98% Comment: 022LNC BMI 23.33 kg/m² WD WN female NAD, she is sitting in a dark room. Tends to hold the left eye closed but when opened conjunctiva looks pretty normal minimal redness, extraocular muscles intact Heart RRR without murmers clicks or rubs Lungs CTA Abdo soft nontender Ext no edema unremarkable 
, Shoulder grossly normal. 
ASSESSMENT and PLAN Encounter Diagnoses Name Primary?  Acute left eye pain Yes  Arthritis of left shoulder region  Uncontrolled type 2 diabetes mellitus with diabetic nephropathy, with long-term current use of insulin ([address]  Chronic obstructive pulmonary disease, unspecified COPD type ([address]  Type 2 diabetes mellitus with diabetic neuropathy, with long-term current use of insulin ([address]  Current mild episode of major depressive disorder without prior episode ([address] Orders Placed This Encounter  sulfacetamide (BLEPH-10) 10 % ophthalmic solution  diclofenac EC (VOLTAREN) 50 mg EC tablet Discussed possible side affects, precautions, and drug interactions and possible benefits of the medication(s). Chronic Conditions Addressed Today 1. Uncontrolled type 2 diabetes mellitus with diabetic nephropathy, with long-term current use of insulin ([address] 2. Type 2 diabetes mellitus with diabetic neuropathy (HCC) 3. Depression 4. COPD (chronic obstructive pulmonary disease) ([address] Acute Diagnoses Addressed Today Acute left eye pain    -  Primary Relevant Medications  
   
 sulfacetamide (BLEPH-10) 10 % ophthalmic solution Arthritis of left shoulder region Relevant Medications  
   
 diclofenac EC (VOLTAREN) 50 mg EC tablet Diabetes is borderline but seems to be in general improving. Depression COPD stable Treatment of acute above symptoms as above. Discussed possible side affects, precautions, and drug interactions and possible benefits of the medication(s). Follow-up and Dispositions · Return in about 1 month (around 6/12/2019), or if symptoms worsen or fail to improve, for routine follow up.

## 2019-05-15 NOTE — PROGRESS NOTES
Chief Complaint Patient presents with  Eye Pain L/eye pain, swollen, pt can barely open her eye I have reviewed the patient's medical history in detail and updated the computerized patient record. Health Maintenance reviewed. 1. Have you been to the ER, urgent care clinic since your last visit? Hospitalized since your last visit?no 2. Have you seen or consulted any other health care providers outside of the [address] [place] [address] since your last visit? Include any pap smears or colon screening. No 
 
 
Encouraged pt to discuss pt's wishes with spouse/partner/family and bring them in the next appt to follow thru with the Advanced Directive Fall Risk Assessment, last 12 mths 5/15/2019 Able to walk? Yes Fall in past 12 months? No  
 
 
3 most recent PHQ Screens 4/12/2019 Little interest or pleasure in doing things Several days Feeling down, depressed, irritable, or hopeless Nearly every day Total Score PHQ 2 4 Trouble falling or staying asleep, or sleeping too much - Feeling tired or having little energy - Poor appetite, weight loss, or overeating - Feeling bad about yourself - or that you are a failure or have let yourself or your family down - Trouble concentrating on things such as school, work, reading, or watching TV - Moving or speaking so slowly that other people could have noticed; or the opposite being so fidgety that others notice - Thoughts of being better off dead, or hurting yourself in some way -  
PHQ 9 Score - How difficult have these problems made it for you to do your work, take care of your home and get along with others - Abuse Screening Questionnaire 4/12/2019 Do you ever feel afraid of your partner? [place] Are you in a relationship with someone who physically or mentally threatens you? [place] Is it safe for you to go home? Y  
 
 

## 2019-05-28 NOTE — PROCEDURES
[facility] PULMONARY FUNCTION TEST Name:  Hannah Monzon 
MR#:  [id number] :  1955 ACCOUNT #:  [de-identified] DATE OF SERVICE:  2019 REASON FOR THE TEST:  Dyspnea with exertion. Spirometry and lung volumes were performed and they revealed: 1. Severe airflow obstruction. 2.  No restrictive lung disease. 3.  Air trapping is present. 4.  Severe reduction of the DLCO. 5.  Significant improvement in FEV1 after bronchodilators. 6.  Flow-volume loop is consistent with airflow obstruction. Dona Gutierrez MD 
 
 
[id number] 
D:  2019 10:40 T:  2019 11:50 
JOB #:  [id number] CC:

## 2019-06-21 NOTE — PATIENT INSTRUCTIONS
Reviewed with patient use of keflex and furosemide and SE. Interpolation Flap Text: A decision was made to reconstruct the defect utilizing an interpolation axial flap and a staged reconstruction.  A telfa template was made of the defect.  This telfa template was then used to outline the interpolation flap.  The donor area for the pedicle flap was then injected with anesthesia.  The flap was excised through the skin and subcutaneous tissue down to the layer of the underlying musculature.  The interpolation flap was carefully excised within this deep plane to maintain its blood supply.  The edges of the donor site were undermined.   The donor site was closed in a primary fashion.  The pedicle was then rotated into position and sutured.  Once the tube was sutured into place, adequate blood supply was confirmed with blanching and refill.  The pedicle was then wrapped with xeroform gauze and dressed appropriately with a telfa and gauze bandage to ensure continued blood supply and protect the attached pedicle.

## 2019-06-25 PROBLEM — F33.1 DEPRESSION, MAJOR, RECURRENT, MODERATE (HCC): Status: ACTIVE | Noted: 2019-01-01

## 2019-06-25 NOTE — PROGRESS NOTES
Chief Complaint Patient presents with  Diabetes  
  follow up  Depression  
  depression almost every day, cannot focus, pain med not working I have reviewed the patient's medical history in detail and updated the computerized patient record. Health Maintenance reviewed. 1. Have you been to the ER, urgent care clinic since your last visit? Hospitalized since your last visit?no 2. Have you seen or consulted any other health care providers outside of the [address] [place] [address] since your last visit? Include any pap smears or colon screening. No 
 
 
Encouraged pt to discuss pt's wishes with spouse/partner/family and bring them in the next appt to follow thru with the Advanced Directive Fall Risk Assessment, last 12 mths 5/15/2019 Able to walk? Yes Fall in past 12 months? No  
 
 
3 most recent PHQ Screens 4/12/2019 Little interest or pleasure in doing things Several days Feeling down, depressed, irritable, or hopeless Nearly every day Total Score PHQ 2 4 Trouble falling or staying asleep, or sleeping too much - Feeling tired or having little energy - Poor appetite, weight loss, or overeating - Feeling bad about yourself - or that you are a failure or have let yourself or your family down - Trouble concentrating on things such as school, work, reading, or watching TV - Moving or speaking so slowly that other people could have noticed; or the opposite being so fidgety that others notice - Thoughts of being better off dead, or hurting yourself in some way -  
PHQ 9 Score - How difficult have these problems made it for you to do your work, take care of your home and get along with others - Abuse Screening Questionnaire 4/12/2019 Do you ever feel afraid of your partner? [place] Fontana Are you in a relationship with someone who physically or mentally threatens you? Jose Fontana Is it safe for you to go home? Y  
 
 

## 2019-06-25 NOTE — PROGRESS NOTES
PROGRESS NOTE SUBJECTIVE: 
Diagnosis/Chief Complaint: Diabetes (follow up) and Depression (depression almost every day, cannot focus, pain med not working) Took her husbands Lyrica x few days for pain, diclofenac not helping. Doing well with mood no Symptoms poor energy, forgetfull confused can't concentrate on bills, only watches TV, doesn't want to be active. Sx x a month Suicidal: no 
Side affects: no 
States taking medications per medicine list.no Had PFTs, severe flow obstruction but no restrictive disease. Has had some poorly controlled diabetes her A1c was 13 but in April it had decreased to 10. No reports of hypoglycemia. Reports taking her insulin. Been on Zoloft other SSRIs without much improvement in her depression symptoms. PHQ 9 reviewed Patient Active Problem List  
 Diagnosis Date Noted  Depression, major, recurrent, moderate ([facility] 2019  Hypothyroidism 2018  Type 2 diabetes mellitus with diabetic neuropathy ([facility] 2018  COPD (chronic obstructive pulmonary disease) ([facility] 2018  At high risk for falls 2017  Bronchitis 2017  Uncontrolled type 2 diabetes mellitus with diabetic nephropathy, with long-term current use of insulin ([facility] 2016 No Known Allergies Social History Tobacco Use  Smoking status: Former Smoker Packs/day: 2.00 Years: 50.00 Pack years: 100.00 Last attempt to quit: 8/3/2017 Years since quittin.8  Smokeless tobacco: Never Used Substance Use Topics  Alcohol use: No  
  
 
OBJECTIVE: . 
Visit Vitals /83 (BP 1 Location: Left arm, BP Patient Position: Sitting) Pulse (!) 106 Temp 98.4 °F (36.9 °C) (Oral) Resp 18 Ht 5' 9\" (1.753 m) Wt 150 lb (68 kg) SpO2 95% BMI 22.15 kg/m² WDWN in NAD Heart RRR, no:C/M/R Lungs CTA No wheezes, rales or rhonchi Abdo: soft no tenderness, rebound or guarding Neurological exam[de-identified] 2-12 intact Psychiatric: Normal mood, judgement Reviewed: Medications, allergies, clinical lab test results and imaging results have been reviewed. Any abnormal findings have been addressed. ASSESSMENT:  
 
  ICD-10-CM ICD-9-CM 1. Uncontrolled type 2 diabetes mellitus with diabetic nephropathy, with long-term current use of insulin ([facility]) E11.21 250.42 Blood-Glucose Meter monitoring kit E11.65 583.81 glucose blood VI test strips (ASCENSIA AUTODISC VI, ONE TOUCH ULTRA TEST VI) strip  
 Z79.4 V58.67 lancets misc DISCONTINUED: Blood-Glucose Meter monitoring kit DISCONTINUED: glucose blood VI test strips (ASCENSIA AUTODISC VI, ONE TOUCH ULTRA TEST VI) strip DISCONTINUED: lancets misc 2. Hypothyroidism, unspecified type E03.9 244.9 3. Depression, major, recurrent, moderate ([facility]) F33.1 296.32 venlafaxine (EFFEXOR) 25 mg tablet 4. Chronic obstructive pulmonary disease, unspecified COPD type ([facility] J44.9 496 5. Fibromyalgia M79.7 729.1 pregabalin (LYRICA) 25 mg capsule 6. Thyroid disease E07.9 246.9 levothyroxine (SYNTHROID) 175 mcg tablet 7. High cholesterol E78.00 272.0 atorvastatin (LIPITOR) 20 mg tablet PLAN Orders Placed This Encounter  DISCONTD: Blood-Glucose Meter monitoring kit Sig: Check blood sugars twice daily and if suspect low blood sugar Dispense:  1 Kit Refill:  0  
 DISCONTD: glucose blood VI test strips (ASCENSIA AUTODISC VI, ONE TOUCH ULTRA TEST VI) strip Sig: Twice daily Dispense:  100 Strip Refill:  5  
 DISCONTD: lancets misc Sig: Twice daily Dispense:  100 Each Refill:  5  
 venlafaxine (EFFEXOR) 25 mg tablet Sig: Take 1 Tab by mouth three (3) times daily. Start 1 tablet daily, usually in the evening, every few days increase as tolerated, mood. Dispense:  90 Tab Refill:  1  pregabalin (LYRICA) 25 mg capsule Sig: Take 1 Cap by mouth three (3) times daily. Max Daily Amount: 75 mg. Dispense:  90 Cap Refill:  1  Blood-Glucose Meter monitoring kit Sig: Check blood sugars twice daily and if suspect low blood sugar Dispense:  1 Kit Refill:  0  
 glucose blood VI test strips (ASCENSIA AUTODISC VI, ONE TOUCH ULTRA TEST VI) strip Sig: Twice daily Dispense:  100 Strip Refill:  5  
 lancets misc Sig: Twice daily Dispense:  100 Each Refill:  5  
 levothyroxine (SYNTHROID) 175 mcg tablet Sig: take 1 tablet by mouth once daily Dispense:  90 Tab Refill:  3  
 insulin detemir U-100 (LEVEMIR FLEXTOUCH) 100 unit/mL (3 mL) inpn Si Units by SubCUTAneous route nightly. Dispense:  15 Pen Refill:  1  
 atorvastatin (LIPITOR) 20 mg tablet Sig: take 1 tablet by mouth once daily Dispense:  90 Tab Refill:  3 Discussed possible side affects, precautions, and drug interactions and possible benefits of the medication(s). Somewhat treatment resistant depression try above antidepressant. Diabetes seems to be a little bit better, continue current insulin dosage which I bumped up a little bit. Check her labs when she follows up Follow-up and Dispositions · Return in about 6 weeks (around 2019) for routine follow up.

## 2019-07-17 NOTE — PROGRESS NOTES
Subjective:     Marlyn Dean is a 70 y.o. female seen for follow-up of diabetes. Agree with comments, see chief complaint. She has had hypoglycemic attacks. .yes  Blood sugar control has been as low as 50's it woke her from sleep, early AM  She has diabetes, hypertension and hyperlipidemia. Marlyn Dean has the additional concern of episode of chest pain yesterday. Last A1C was 10.5  todays cont poor at 11.9  Diabetic Review of Systems: no polyuria or polydipsia, no chest pain, dyspnea or TIA's, usu dyspnea not worse. Usu dyspnea not worse, CP resolved. Wwas not exertional, no known cardiac Dx  No Known Allergies    Diet and Lifestyle: nonsmoker. Patient Active Problem List    Diagnosis Date Noted    Depression, major, recurrent, moderate ([address] 2019    Hypothyroidism 2018    Type 2 diabetes mellitus with diabetic neuropathy ([facility] 2018    COPD (chronic obstructive pulmonary disease) ([facility] 2018    At high risk for falls 2017    Bronchitis 2017    Uncontrolled type 2 diabetes mellitus with diabetic nephropathy, with long-term current use of insulin ([address] 2016     No Known Allergies  Past Medical History:   Diagnosis Date    Bronchitis     COPD (chronic obstructive pulmonary disease) ([address]     Depression     x 1 year    Diabetes ([address]     > 15 years    DKA (diabetic ketoacidoses) ([facility]     10/29/     Generalized pain     High cholesterol     Thyroid disease     Thyroid removed.      Social History     Tobacco Use    Smoking status: Former Smoker     Packs/day: 2.00     Years: 50.00     Pack years: 100.00     Last attempt to quit: 8/3/2017     Years since quittin.9    Smokeless tobacco: Never Used   Substance Use Topics    Alcohol use: No        Lab Results   Component Value Date/Time    Hemoglobin A1c 13.1 (H) 2019 03:21 PM    Hemoglobin A1c 11.3 (H) 07/10/2017 09:07 AM    Hemoglobin A1c, External 9.5 2017 Glucose 100 (H) 01/08/2019 03:21 PM    Glucose POC [facility] 09/21/2018 08:52 AM    Microalb/Creat ratio (ug/mg creat.) 67.0 (H) 07/27/2018 10:43 AM    MICROALBUMIN,MG/DAY Comment 07/10/2017 09:07 AM    LDL, calculated 58 01/08/2019 03:21 PM    Creatinine 0.76 01/08/2019 03:21 PM      Lab Results   Component Value Date/Time    Cholesterol, total 169 01/08/2019 03:21 PM    HDL Cholesterol 96 01/08/2019 03:21 PM    LDL, calculated 58 01/08/2019 03:21 PM    Triglyceride 73 01/08/2019 03:21 PM        Review of Systems  Pertinent items are noted in HPI. Objective:     Significant for the following:     Visit Vitals  /76 (BP 1 Location: Right arm, BP Patient Position: Sitting)   Pulse (!) 106   Temp 98.1 °F (36.7 °C) (Oral)   Resp 16   Ht 5' 9\" (1.753 m)   Wt 160 lb 6.4 oz (72.8 kg)   LMP  (LMP Unknown)   SpO2 96%   BMI 23.69 kg/m²     Appearance: chronically ill appearing. on O2  Exam: heart sounds normal rate, regular rhythm, normal S1, S2, no murmurs, rubs, clicks or gallops, chest clear  Foot exam:     Lab review: orders written for new lab studies as appropriate; see orders. Assessment/Plan:     Follow-up diabetes poorly controlled, needs further observation, needs improvement. Diabetic issues reviewed with her: all medications, side effects and compliance discussed carefully and glycohemoglobin and other lab monitoring discussed. Chronic Conditions Addressed Today     1.  Uncontrolled type 2 diabetes mellitus with diabetic nephropathy, with long-term current use of insulin ([facility]) - Primary     Relevant Medications     pregabalin (LYRICA) 75 mg capsule     insulin detemir U-100 (LEVEMIR FLEXTOUCH) 100 unit/mL (3 mL) inpn     insulin NPH/insulin regular (HUMULIN 70/30 U-100 INSULIN) 100 unit/mL (70-30) injection     Other Relevant Orders     AMB POC HEMOGLOBIN A1C (Completed)      Acute Diagnoses Addressed Today     Chest pain, unspecified type            Relevant Orders        AMB POC EKG ROUTINE W/ 12 LEADS, INTER & REP (Completed)    Neuropathy due to type 2 diabetes mellitus (HCC)            Relevant Medications        pregabalin (LYRICA) 75 mg capsule        insulin detemir U-100 (LEVEMIR FLEXTOUCH) 100 unit/mL (3 mL) inpn        insulin NPH/insulin regular (HUMULIN 70/30 U-100 INSULIN) 100 unit/mL (70-30) injection        Cp precautions given  OK in lyrica. Current Outpatient Medications   Medication Sig Dispense Refill    pregabalin (LYRICA) 75 mg capsule Take 1 Cap by mouth three (3) times daily. Max Daily Amount: 225 mg. Indications: Diabetic Complication causing Injury to some Body Nerves 90 Cap 1    insulin detemir U-100 (LEVEMIR FLEXTOUCH) 100 unit/mL (3 mL) inpn 12 Units by SubCUTAneous route nightly. 15 Pen 1    insulin NPH/insulin regular (HUMULIN 70/30 U-100 INSULIN) 100 unit/mL (70-30) injection 40 Units by SubCUTAneous route Daily (before breakfast). 30 mL 3    venlafaxine (EFFEXOR) 25 mg tablet Take 1 Tab by mouth three (3) times daily. Start 1 tablet daily, usually in the evening, every few days increase as tolerated, mood. 90 Tab 1    Blood-Glucose Meter monitoring kit Check blood sugars twice daily and if suspect low blood sugar 1 Kit 0    glucose blood VI test strips (ASCENSIA AUTODISC VI, ONE TOUCH ULTRA TEST VI) strip Twice daily 100 Strip 5    lancets misc Twice daily 100 Each 5    levothyroxine (SYNTHROID) 175 mcg tablet take 1 tablet by mouth once daily 90 Tab 3    atorvastatin (LIPITOR) 20 mg tablet take 1 tablet by mouth once daily 90 Tab 3    furosemide (LASIX) 20 mg tablet 1 to 2 every AM as needed 90 Tab 2    lisinopril (PRINIVIL, ZESTRIL) 20 mg tablet Take 1 Tab by mouth daily. 90 Tab 1    busPIRone (BUSPAR) 7.5 mg tablet Take 1 Tab by mouth two (2) times a day. 180 Tab 1    triamcinolone acetonide (KENALOG) 0.1 % ointment Apply  to affected area two (2) times a day.  use thin layer 80 g 1    albuterol-ipratropium (DUO-NEB) 2.5 mg-0.5 mg/3 ml nebu 3 mL by Nebulization route every four (4) hours as needed. 300 Nebule 1    BD INSULIN SYRINGE ULTRA-FINE 1 mL 31 gauge x 5/16 syrg 1 Each by Does Not Apply route daily. 100 Syringe 3    OXYGEN-AIR DELIVERY SYSTEMS by Does Not Apply route. Indications: [employer]      aspirin delayed-release 81 mg tablet Take 1 Tab by mouth daily. Follow-up and Dispositions    · Return in about 1 month (around 8/14/2019) for routine follow up.

## 2019-07-17 NOTE — TELEPHONE ENCOUNTER
Pt called in reference to wanting to speak with the nurse in reference to her medications(lyrica). Please call her at [phone].

## 2019-07-17 NOTE — PROGRESS NOTES
1. Have you been to the ER, urgent care clinic since your last visit? Hospitalized since your last visit? No    2. Have you seen or consulted any other health care providers outside of the [address] [place] [address] since your last visit? Include any pap smears or colon screening. No     Health Maintenance Due   Topic Date Due    Shingrix Vaccine Age 49> (1 of 2) 11/25/2003    Bone Densitometry (Dexa) Screening  11/25/2018    MICROALBUMIN Q1  07/27/2019    FOOT EXAM Q1  08/14/2019     Do you have an [address] in place in the event that you have a healthcare crisis that could impact your decision making as it pertains to your health? NO    Would you like information about Advance Care Planning? NO    Information given.  NO

## 2019-07-18 NOTE — PROGRESS NOTES
NSR NAD non specific ST wave changes Render Post-Care Instructions In Note?: no Post-Care Instructions: I reviewed with the patient in detail post-care instructions. Patient is to wear sunprotection, and avoid picking at any of the treated lesions. Pt may apply Vaseline to crusted or scabbing areas. Duration Of Freeze Thaw-Cycle (Seconds): 15 Consent: The patient's consent was obtained including but not limited to risks of crusting, scabbing, blistering, scarring, darker or lighter pigmentary change, recurrence, incomplete removal and infection. Detail Level: Simple Number Of Freeze-Thaw Cycles: 1 freeze-thaw cycle Medical Necessity Clause: This procedure was medically necessary because the lesions that were treated were intensely: Medical Necessity Information: It is in your best interest to select a reason for this procedure from the list below. All of these items fulfill various CMS LCD requirements except the new and changing color options. Detail Level: Detailed

## 2019-08-05 NOTE — TELEPHONE ENCOUNTER
Requested Prescriptions     Pending Prescriptions Disp Refills    pregabalin (LYRICA) 75 mg capsule 90 Cap 1     Sig: Take 1 Cap by mouth three (3) times daily. Max Daily Amount: 225 mg.  Indications: Diabetic Complication causing Injury to some Body Nerves    [facility]

## 2019-08-06 NOTE — TELEPHONE ENCOUNTER
Yaron, from [facility], called in reference to needing last office notes for patients oxygen to be faxed to [phone].

## 2019-08-08 NOTE — TELEPHONE ENCOUNTER
Last office visit:  07/17/2019  Last filled:  Lyrica 75mg 1 TID #90 X 1 refill 07/17/2019  No changes  Follow up scheduled 08/26/2019

## 2019-09-04 NOTE — PROGRESS NOTES
Chief Complaint   Patient presents with    Medication Refill     [facility]. I have reviewed the patient's medical history in detail and updated the computerized patient record. 1. Have you been to the ER, urgent care clinic since your last visit? Hospitalized since your last visit?no    2. Have you seen or consulted any other health care providers outside of the [address] [place] [address] since your last visit? Include any pap smears or colon screening. No      Encouraged pt to discuss pt's wishes with spouse/partner/family and bring them in the next appt to follow thru with the Advanced Directive    Fall Risk Assessment, last 12 mths 9/4/2019   Able to walk? Yes   Fall in past 12 months? -       3 most recent PHQ Screens 6/25/2019   Little interest or pleasure in doing things Nearly every day   Feeling down, depressed, irritable, or hopeless Nearly every day   Total Score PHQ 2 6   Trouble falling or staying asleep, or sleeping too much Nearly every day   Feeling tired or having little energy Nearly every day   Poor appetite, weight loss, or overeating Several days   Feeling bad about yourself - or that you are a failure or have let yourself or your family down Several days   Trouble concentrating on things such as school, work, reading, or watching TV Nearly every day   Moving or speaking so slowly that other people could have noticed; or the opposite being so fidgety that others notice Not at all   Thoughts of being better off dead, or hurting yourself in some way Several days   PHQ 9 Score 18   How difficult have these problems made it for you to do your work, take care of your home and get along with others Very difficult       Abuse Screening Questionnaire 4/12/2019   Do you ever feel afraid of your partner? N   Are you in a relationship with someone who physically or mentally threatens you? N   Is it safe for you to go home?  Y       ADL Assessment 4/12/2019   Feeding yourself No Help Needed   Getting from bed to chair No Help Needed   Getting dressed No Help Needed   Bathing or showering No Help Needed   Walk across the room (includes cane/walker) No Help Needed   Using the telphone No Help Needed   Taking your medications No Help Needed   Preparing meals No Help Needed   Managing money (expenses/bills) No Help Needed   Moderately strenuous housework (laundry) No Help Needed   Shopping for personal items (toiletries/medicines) No Help Needed   Shopping for groceries No Help Needed   Driving No Help Needed   Climbing a flight of stairs No Help Needed   Getting to places beyond walking distances No Help Needed

## 2019-09-04 NOTE — PROGRESS NOTES
Subjective:     Elizabeth Ambriz is a 70 y.o. female seen for follow-up of diabetes. Feeling poorlyx weeks mo  She has had hypoglycemic attacks. .yes  Blood sugar control has been highs and lows, 50's wakes her up Last A1C was 11.9. She has diabetes, hypertension and hyperlipidemia. Here with daughter  Elizabeth Ambriz has the additional concern of BS stay high but low as well. Was on metfor,long time ago min improvement so got changed to insulin  lyrica rf needed    Diabetic Review of Systems: no polyuria or polydipsia, no chest pain, dyspnea or TIA's, no numbness, tingling or pain in extremities. No Known Allergies    Diet and Lifestyle: follows a diabetic diet regularly, nonsmoker. Patient Active Problem List    Diagnosis Date Noted    Depression, major, recurrent, moderate ([address] 2019    Hypothyroidism 2018    Type 2 diabetes mellitus with diabetic neuropathy ([address] 2018    COPD (chronic obstructive pulmonary disease) ([facility] 2018    At high risk for falls 2017    Bronchitis 2017    Uncontrolled type 2 diabetes mellitus with diabetic nephropathy, with long-term current use of insulin ([address] 2016     No Known Allergies  Past Medical History:   Diagnosis Date    Bronchitis     COPD (chronic obstructive pulmonary disease) ([address]     Depression     x 1 year    Diabetes ([address]     > 15 years    DKA (diabetic ketoacidoses) ([address]     10/29/ hospitalization    Generalized pain     High cholesterol     Thyroid disease     Thyroid removed.      Social History     Tobacco Use    Smoking status: Former Smoker     Packs/day: 2.00     Years: 50.00     Pack years: 100.00     Last attempt to quit: 8/3/2017     Years since quittin.0    Smokeless tobacco: Never Used   Substance Use Topics    Alcohol use: No        Lab Results   Component Value Date/Time    WBC 8.0 2017 11:38 AM    HGB 10.9 (L) 2017 11:38 AM    HCT 34.4 2017 11:38 AM PLATELET 541 80/23/4185 11:38 AM    MCV 98 (H) 09/14/2017 11:38 AM     Lab Results   Component Value Date/Time    Hemoglobin A1c 13.1 (H) 01/08/2019 03:21 PM    Hemoglobin A1c 11.3 (H) 07/10/2017 09:07 AM    Hemoglobin A1c, External 9.5 08/04/2017    Glucose 100 (H) 01/08/2019 03:21 PM    Glucose POC 79 09/04/2019 03:45 PM    Microalb/Creat ratio (ug/mg creat.) 305.2 (H) 09/04/2019 03:43 PM    MICROALBUMIN,MG/DAY Comment 07/10/2017 09:07 AM    LDL, calculated 58 01/08/2019 03:21 PM    Creatinine 0.76 01/08/2019 03:21 PM      Lab Results   Component Value Date/Time    ALT (SGPT) 16 04/27/2018 10:55 AM    AST (SGOT) 20 04/27/2018 10:55 AM    Alk. phosphatase 122 (H) 04/27/2018 10:55 AM    Bilirubin, total 0.4 04/27/2018 10:55 AM    Albumin 4.3 04/27/2018 10:55 AM    Protein, total 6.8 04/27/2018 10:55 AM    PLATELET 622 23/66/9441 11:38 AM     Lab Results   Component Value Date/Time    GFR est non-AA 83 01/08/2019 03:21 PM    GFR est AA 95 01/08/2019 03:21 PM    Creatinine 0.76 01/08/2019 03:21 PM    BUN 19 01/08/2019 03:21 PM    Sodium 143 01/08/2019 03:21 PM    Potassium 4.5 01/08/2019 03:21 PM    Chloride 103 01/08/2019 03:21 PM    CO2 25 01/08/2019 03:21 PM     Lab Results   Component Value Date/Time    Glucose 100 (H) 01/08/2019 03:21 PM    Glucose POC 79 09/04/2019 03:45 PM         Review of Systems  Pertinent items are noted in HPI. Objective:     Significant for the following:     Visit Vitals  /75 (BP 1 Location: Left arm, BP Patient Position: Sitting)   Pulse 99   Temp 98.1 °F (36.7 °C) (Oral)   Resp 18   Ht 5' 9\" (1.753 m)   Wt 173 lb (78.5 kg)   LMP  (LMP Unknown)   SpO2 96%   BMI 25.55 kg/m²     Appearance: alert, well appearing, and in no distress. Exam: heart sounds normal rate, regular rhythm, normal S1, S2, no murmurs, rubs, clicks or gallops, chest clear, no hepatosplenomegaly, feet: warm, good capillary refill  Foot exam: Diabetic foot exam was performed. No obvious sores or red lesions. Sensation checked by monofilament exam which was normal.  Dorsalis pedis pulse waspresent. Lab review: labs reviewed, I note that glycosylated hemoglobin abnormal 11.9. Assessment/Plan:     Follow-up diabetes poorly controlled. Diabetic issues reviewed with her: diabetic diet discussed in detail, written exchange diet given, all medications, side effects and compliance discussed carefully and glycohemoglobin and other lab monitoring discussed. Chronic Conditions Addressed Today     1. Uncontrolled type 2 diabetes mellitus with diabetic nephropathy, with long-term current use of insulin ([facility]) - Primary     Relevant Medications     pregabalin (LYRICA) 150 mg capsule     metFORMIN (GLUCOPHAGE) 500 mg tablet     insulin detemir U-100 (LEVEMIR FLEXTOUCH) 100 unit/mL (3 mL) inpn     insulin NPH/insulin regular (HUMULIN 70/30 U-100 INSULIN) 100 unit/mL (70-30) injection     Other Relevant Orders     AMB POC GLUCOSE BLOOD, BY GLUCOSE MONITORING DEVICE (Completed)     MICROALBUMIN, UR, RAND W/ MICROALB/CREAT RATIO (Completed)     METABOLIC PANEL, BASIC    2. COPD (chronic obstructive pulmonary disease) ([facility])      Acute Diagnoses Addressed Today     Neuropathy due to type 2 diabetes mellitus ([facility])            Relevant Medications        pregabalin (LYRICA) 150 mg capsule        metFORMIN (GLUCOPHAGE) 500 mg tablet        insulin detemir U-100 (LEVEMIR FLEXTOUCH) 100 unit/mL (3 mL) inpn        insulin NPH/insulin regular (HUMULIN 70/30 U-100 INSULIN) 100 unit/mL (70-30) injection        Hypos dec insulin add metformin. Follow-up and Dispositions    · Return in about 10 days (around 9/14/2019) for nurse visit. Labs before the f/u visit  Discussed possible side affects, precautions, and drug interactions and possible benefits of the medication(s).

## 2019-09-06 NOTE — PROGRESS NOTES
Send normal/stable results letter. Your results are normal/stable. If not signed up, consider getting my chart to get your results on-line. We can help you to sign up. Some proteinuria, continue lisinopril which should protect the kidneys.

## 2019-09-13 NOTE — TELEPHONE ENCOUNTER
Pt called in reference to wanting to let Dr. Macie Hussein know that her sugar has been running in the 400's. She said that she upped her insulin to 20. Please call her at [phone].

## 2019-09-17 NOTE — TELEPHONE ENCOUNTER
JCI:      Called patient - she has had upset stomach with vomiting for 2 days - taking Levemir 10 units as prescribed, but on Saturday her blood sugar went up to 500, so she started increasing her 70/30 insulin dose by increments of 5 units until it came down, she ended up  Taking 20 units of 70/30 insulin - yesterday her blood sugar was 148 - has not checked it today - advised patient that she needs to come in to see Dr Nick Farmer in order to re evaluate her insulin and blood sugars -  Patient states she will have to call us back because she has to wait for her  to come by and bring her in - will make Dr Nick Farmer aware  Krystle Johnson LPN  9/40/7428  1:41 PM

## 2019-10-16 NOTE — PROGRESS NOTES
Chief Complaint   Patient presents with    Diabetes     follow up     I have reviewed the patient's medical history in detail and updated the computerized patient record. Health Maintenance reviewed. 1. Have you been to the ER, urgent care clinic since your last visit? Hospitalized since your last visit?no    2. Have you seen or consulted any other health care providers outside of the [address] [place] [address] since your last visit? Include any pap smears or colon screening. No      Encouraged pt to discuss pt's wishes with spouse/partner/family and bring them in the next appt to follow thru with the Advanced Directive    Fall Risk Assessment, last 12 mths 9/4/2019   Able to walk? Yes   Fall in past 12 months? -       3 most recent PHQ Screens 6/25/2019   Little interest or pleasure in doing things Nearly every day   Feeling down, depressed, irritable, or hopeless Nearly every day   Total Score PHQ 2 6   Trouble falling or staying asleep, or sleeping too much Nearly every day   Feeling tired or having little energy Nearly every day   Poor appetite, weight loss, or overeating Several days   Feeling bad about yourself - or that you are a failure or have let yourself or your family down Several days   Trouble concentrating on things such as school, work, reading, or watching TV Nearly every day   Moving or speaking so slowly that other people could have noticed; or the opposite being so fidgety that others notice Not at all   Thoughts of being better off dead, or hurting yourself in some way Several days   PHQ 9 Score 18   How difficult have these problems made it for you to do your work, take care of your home and get along with others Very difficult       Abuse Screening Questionnaire 4/12/2019   Do you ever feel afraid of your partner? N   Are you in a relationship with someone who physically or mentally threatens you? N   Is it safe for you to go home?  Y       ADL Assessment 4/12/2019   Feeding yourself No Help Needed   Getting from bed to chair No Help Needed   Getting dressed No Help Needed   Bathing or showering No Help Needed   Walk across the room (includes cane/walker) No Help Needed   Using the telphone No Help Needed   Taking your medications No Help Needed   Preparing meals No Help Needed   Managing money (expenses/bills) No Help Needed   Moderately strenuous housework (laundry) No Help Needed   Shopping for personal items (toiletries/medicines) No Help Needed   Shopping for groceries No Help Needed   Driving No Help Needed   Climbing a flight of stairs No Help Needed   Getting to places beyond walking distances No Help Needed

## 2019-10-16 NOTE — PATIENT INSTRUCTIONS
Learning About Meal Planning for Diabetes  Why plan your meals? Meal planning can be a key part of managing diabetes. Planning meals and snacks with the right balance of carbohydrate, protein, and fat can help you keep your blood sugar at the target level you set with your doctor. You don't have to eat special foods. You can eat what your family eats, including sweets once in a while. But you do have to pay attention to how often you eat and how much you eat of certain foods. You may want to work with a dietitian or a certified diabetes educator. He or she can give you tips and meal ideas and can answer your questions about meal planning. This health professional can also help you reach a healthy weight if that is one of your goals. What plan is right for you? Your dietitian or diabetes educator may suggest that you start with the plate format or carbohydrate counting. The plate format  The plate format is a simple way to help you manage how you eat. You plan meals by learning how much space each food should take on a plate. Using the plate format helps you spread carbohydrate throughout the day. It can make it easier to keep your blood sugar level within your target range. It also helps you see if you're eating healthy portion sizes. To use the plate format, you put non-starchy vegetables on half your plate. Add meat or meat substitutes on one-quarter of the plate. Put a grain or starchy vegetable (such as brown rice or a potato) on the final quarter of the plate. You can add a small piece of fruit and some low-fat or fat-free milk or yogurt, depending on your carbohydrate goal for each meal.  Here are some tips for using the plate format:  · Make sure that you are not using an oversized plate. A 9-inch plate is best. Many restaurants use larger plates. · Get used to using the plate format at home. Then you can use it when you eat out. · Write down your questions about using the plate format.  Talk to your doctor, a dietitian, or a diabetes educator about your concerns. Carbohydrate counting  With carbohydrate counting, you plan meals based on the amount of carbohydrate in each food. Carbohydrate raises blood sugar higher and more quickly than any other nutrient. It is found in desserts, breads and cereals, and fruit. It's also found in starchy vegetables such as potatoes and corn, grains such as rice and pasta, and milk and yogurt. Spreading carbohydrate throughout the day helps keep your blood sugar levels within your target range. Your daily amount depends on several things, including your weight, how active you are, which diabetes medicines you take, and what your goals are for your blood sugar levels. A registered dietitian or diabetes educator can help you plan how much carbohydrate to include in each meal and snack. A guideline for your daily amount of carbohydrate is:  · 45 to 60 grams at each meal. That's about the same as 3 to 4 carbohydrate servings. · 15 to 20 grams at each snack. That's about the same as 1 carbohydrate serving. The Nutrition Facts label on packaged foods tells you how much carbohydrate is in a serving of the food. First, look at the serving size on the food label. Is that the amount you eat in a serving? All of the nutrition information on a food label is based on that serving size. So if you eat more or less than that, you'll need to adjust the other numbers. Total carbohydrate is the next thing you need to look for on the label. If you count carbohydrate servings, one serving of carbohydrate is 15 grams. For foods that don't come with labels, such as fresh fruits and vegetables, you'll need a guide that lists carbohydrate in these foods. Ask your doctor, dietitian, or diabetes educator about books or other nutrition guides you can use.   If you take insulin, you need to know how many grams of carbohydrate are in a meal. This lets you know how much rapid-acting insulin to take before you eat. If you use an insulin pump, you get a constant rate of insulin during the day. So the pump must be programmed at meals to give you extra insulin to cover the rise in blood sugar after meals. When you know how much carbohydrate you will eat, you can take the right amount of insulin. Or, if you always use the same amount of insulin, you need to make sure that you eat the same amount of carbohydrate at meals. If you need more help to understand carbohydrate counting and food labels, ask your doctor, dietitian, or diabetes educator. How do you get started with meal planning? Here are some tips to get started:  · Plan your meals a week at a time. Don't forget to include snacks too. · Use cookbooks or online recipes to plan several main meals. Plan some quick meals for busy nights. You also can double some recipes that freeze well. Then you can save half for other busy nights when you don't have time to cook. · Make sure you have the ingredients you need for your recipes. If you're running low on basic items, put these items on your shopping list too. · List foods that you use to make breakfasts, lunches, and snacks. List plenty of fruits and vegetables. · Post this list on the refrigerator. Add to it as you think of more things you need. · Take the list to the store to do your weekly shopping. Follow-up care is a key part of your treatment and safety. Be sure to make and go to all appointments, and call your doctor if you are having problems. It's also a good idea to know your test results and keep a list of the medicines you take. Where can you learn more? Go to http://chandrika-tyrone.info/. Tavon Rondon in the search box to learn more about \"Learning About Meal Planning for Diabetes. \"  Current as of: April 16, 2019  Content Version: 12.2  © 9149-2821 [employer].  Care instructions adapted under license by [employer] (which disclaims liability or warranty for this information). If you have questions about a medical condition or this instruction, always ask your healthcare professional. [address] any warranty or liability for your use of this information. Learning About Diabetes Food Guidelines  Your Care Instructions    Meal planning is important to manage diabetes. It helps keep your blood sugar at a target level (which you set with your doctor). You don't have to eat special foods. You can eat what your family eats, including sweets once in a while. But you do have to pay attention to how often you eat and how much you eat of certain foods. You may want to work with a dietitian or a certified diabetes educator (CDE) to help you plan meals and snacks. A dietitian or CDE can also help you lose weight if that is one of your goals. What should you know about eating carbs? Managing the amount of carbohydrate (carbs) you eat is an important part of healthy meals when you have diabetes. Carbohydrate is found in many foods. · Learn which foods have carbs. And learn the amounts of carbs in different foods. ? Bread, cereal, pasta, and rice have about 15 grams of carbs in a serving. A serving is 1 slice of bread (1 ounce), ½ cup of cooked cereal, or 1/3 cup of cooked pasta or rice. ? Fruits have 15 grams of carbs in a serving. A serving is 1 small fresh fruit, such as an apple or orange; ½ of a banana; ½ cup of cooked or canned fruit; ½ cup of fruit juice; 1 cup of melon or raspberries; or 2 tablespoons of dried fruit. ? Milk and no-sugar-added yogurt have 15 grams of carbs in a serving. A serving is 1 cup of milk or 2/3 cup of no-sugar-added yogurt. ? Starchy vegetables have 15 grams of carbs in a serving. A serving is ½ cup of mashed potatoes or sweet potato; 1 cup winter squash; ½ of a small baked potato; ½ cup of cooked beans; or ½ cup cooked corn or green peas.   · Learn how much carbs to eat each day and at each meal. A dietitian or CDE can teach you how to keep track of the amount of carbs you eat. This is called carbohydrate counting. · If you are not sure how to count carbohydrate grams, use the Plate Method to plan meals. It is a good, quick way to make sure that you have a balanced meal. It also helps you spread carbs throughout the day. ? Divide your plate by types of foods. Put non-starchy vegetables on half the plate, meat or other protein food on one-quarter of the plate, and a grain or starchy vegetable in the final quarter of the plate. To this you can add a small piece of fruit and 1 cup of milk or yogurt, depending on how many carbs you are supposed to eat at a meal.  · Try to eat about the same amount of carbs at each meal. Do not \"save up\" your daily allowance of carbs to eat at one meal.  · Proteins have very little or no carbs per serving. Examples of proteins are beef, chicken, turkey, fish, eggs, tofu, cheese, cottage cheese, and peanut butter. A serving size of meat is 3 ounces, which is about the size of a deck of cards. Examples of meat substitute serving sizes (equal to 1 ounce of meat) are 1/4 cup of cottage cheese, 1 egg, 1 tablespoon of peanut butter, and ½ cup of tofu. How can you eat out and still eat healthy? · Learn to estimate the serving sizes of foods that have carbohydrate. If you measure food at home, it will be easier to estimate the amount in a serving of restaurant food. · If the meal you order has too much carbohydrate (such as potatoes, corn, or baked beans), ask to have a low-carbohydrate food instead. Ask for a salad or green vegetables. · If you use insulin, check your blood sugar before and after eating out to help you plan how much to eat in the future. · If you eat more carbohydrate at a meal than you had planned, take a walk or do other exercise. This will help lower your blood sugar. What else should you know?   · Limit saturated fat, such as the fat from meat and dairy products. This is a healthy choice because people who have diabetes are at higher risk of heart disease. So choose lean cuts of meat and nonfat or low-fat dairy products. Use olive or canola oil instead of butter or shortening when cooking. · Don't skip meals. Your blood sugar may drop too low if you skip meals and take insulin or certain medicines for diabetes. · Check with your doctor before you drink alcohol. Alcohol can cause your blood sugar to drop too low. Alcohol can also cause a bad reaction if you take certain diabetes medicines. Follow-up care is a key part of your treatment and safety. Be sure to make and go to all appointments, and call your doctor if you are having problems. It's also a good idea to know your test results and keep a list of the medicines you take. Where can you learn more? Go to http://chandrika-tyrone.info/. Enter Q241 in the search box to learn more about \"Learning About Diabetes Food Guidelines. \"  Current as of: April 16, 2019  Content Version: 12.2  © 1430-2159 [employer]. Care instructions adapted under license by [employer] (which disclaims liability or warranty for this information). If you have questions about a medical condition or this instruction, always ask your healthcare professional. [address] any warranty or liability for your use of this information. Counting Carbohydrates: Care Instructions  Your Care Instructions    You don't have to eat special foods when you have diabetes. You just have to be careful to eat healthy foods. Carbohydrates (carbs) raise blood sugar higher and quicker than any other nutrient. Carbs are found in desserts, breads and cereals, and fruit. They're also in starchy vegetables. These include potatoes, corn, and grains such as rice and pasta. Carbs are also in milk and yogurt. The more carbs you eat at one time, the higher your blood sugar will rise.  Spreading carbs all through the day helps keep your blood sugar levels within your target range. Counting carbs is one of the best ways to keep your blood sugar under control. If you use insulin, counting carbs helps you match the right amount of insulin to the number of grams of carbs in a meal. Then you can change your diet and insulin dose as needed. Testing your blood sugar several times a day can help you learn how carbs affect your blood sugar. A registered dietitian or certified diabetes educator can help you plan meals and snacks. Follow-up care is a key part of your treatment and safety. Be sure to make and go to all appointments, and call your doctor if you are having problems. It's also a good idea to know your test results and keep a list of the medicines you take. How can you care for yourself at home? Know your daily amount of carbohydrates  Your daily amount depends on several things, such as your weight, how active you are, which diabetes medicines you take, and what your goals are for your blood sugar levels. A registered dietitian or certified diabetes educator can help you plan how many carbs to include in each meal and snack. For most adults, a guideline for the daily amount of carbs is:  · 45 to 60 grams at each meal. That's about the same as 3 to 4 carbohydrate servings. · 15 to 20 grams at each snack. That's about the same as 1 carbohydrate serving. Count carbs  Counting carbs lets you know how much rapid-acting insulin to take before you eat. If you use an insulin pump, you get a constant rate of insulin during the day. So the pump must be programmed at meals. This gives you extra insulin to cover the rise in blood sugar after meals. If you take insulin:  · Learn your own insulin-to-carb ratio. You and your diabetes health professional will figure out the ratio. You can do this by testing your blood sugar after meals.  For example, you may need a certain amount of insulin for every 15 grams of carbs.  · Add up the carb grams in a meal. Then you can figure out how many units of insulin to take based on your insulin-to-carb ratio. · Exercise lowers blood sugar. You can use less insulin than you would if you were not doing exercise. Keep in mind that timing matters. If you exercise within 1 hour after a meal, your body may need less insulin for that meal than it would if you exercised 3 hours after the meal. Test your blood sugar to find out how exercise affects your need for insulin. If you do or don't take insulin:  · Look at labels on packaged foods. This can tell you how many carbs are in a serving. You can also use guides from the American Diabetes Association. · Be aware of portions, or serving sizes. If a package has two servings and you eat the whole package, you need to double the number of grams of carbohydrate listed for one serving. · Protein, fat, and fiber do not raise blood sugar as much as carbs do. If you eat a lot of these nutrients in a meal, your blood sugar will rise more slowly than it would otherwise. Eat from all food groups  · Eat at least three meals a day. · Plan meals to include food from all the food groups. The food groups include grains, fruits, dairy, proteins, and vegetables. · Talk to your dietitian or diabetes educator about ways to add limited amounts of sweets into your meal plan. · If you drink alcohol, talk to your doctor. It may not be recommended when you are taking certain diabetes medicines. Where can you learn more? Go to http://chandrika-tyrone.info/. Enter F581 in the search box to learn more about \"Counting Carbohydrates: Care Instructions. \"  Current as of: April 16, 2019  Content Version: 12.2  © 3712-0347 [employer]. Care instructions adapted under license by [employer] (which disclaims liability or warranty for this information).  If you have questions about a medical condition or this instruction, always ask your healthcare professional. [place] [address] any warranty or liability for your use of this information.

## 2019-10-16 NOTE — PROGRESS NOTES
Subjective:     Ana Maria Rutledge is a 70 y.o. female seen for follow-up of diabetes. Did not tolerate metformin, stopped it, rash on face  She has had hypoglycemic attacks. .no  Blood sugar control has been not checked lately, last A1C was 11.9 but says doing better with DM  She has diabetes, hypertension and hyperlipidemia. Takes her insulin    Ana Maria Rutledge has the additional concern of usu dyspnea bhupinder if more exertional  Lyrica rf is alittle early, still some anxiety issues    Patient Active Problem List   Diagnosis Code    Uncontrolled type 2 diabetes mellitus with diabetic nephropathy, with long-term current use of insulin ([facility]) E11.21, E11.65, Z79.4    At high risk for falls Z91.81    Bronchitis J40    COPD (chronic obstructive pulmonary disease) ([facility] J44.9    Type 2 diabetes mellitus with diabetic neuropathy ([facility] E11.40    Hypothyroidism E03.9    Depression, major, recurrent, moderate ([facility]) F33.1         Diabetic Review of Systems: no polyuria or polydipsia, no hypoglycemia, has dysesthesias in the feet, Cp here and there not exertional.    No Known Allergies    Diet and Lifestyle: does not rigorously follow a diabetic diet, nonsmoker.         No Known Allergies  Social History     Tobacco Use    Smoking status: Former Smoker     Packs/day: 2.00     Years: 50.00     Pack years: 100.00     Last attempt to quit: 8/3/2017     Years since quittin.2    Smokeless tobacco: Never Used   Substance Use Topics    Alcohol use: No        Lab Results   Component Value Date/Time    WBC 8.0 2017 11:38 AM    HGB 10.9 (L) 2017 11:38 AM    HCT 34.4 2017 11:38 AM    PLATELET 378  11:38 AM    MCV 98 (H) 2017 11:38 AM     Lab Results   Component Value Date/Time    Hemoglobin A1c 13.1 (H) 2019 03:21 PM    Hemoglobin A1c 11.3 (H) 07/10/2017 09:07 AM    Hemoglobin A1c, External 9.5 2017    Glucose 100 (H) 2019 03:21 PM    Glucose POC 79 2019 03:45 PM Microalb/Creat ratio (ug/mg creat.) 305.2 (H) 09/04/2019 03:43 PM    MICROALBUMIN,MG/DAY Comment 07/10/2017 09:07 AM    LDL, calculated 58 01/08/2019 03:21 PM    Creatinine 0.76 01/08/2019 03:21 PM      Lab Results   Component Value Date/Time    Cholesterol, total 169 01/08/2019 03:21 PM    HDL Cholesterol 96 01/08/2019 03:21 PM    LDL, calculated 58 01/08/2019 03:21 PM    Triglyceride 73 01/08/2019 03:21 PM     Lab Results   Component Value Date/Time    ALT (SGPT) 16 04/27/2018 10:55 AM    AST (SGOT) 20 04/27/2018 10:55 AM    Alk. phosphatase 122 (H) 04/27/2018 10:55 AM    Bilirubin, total 0.4 04/27/2018 10:55 AM    Albumin 4.3 04/27/2018 10:55 AM    Protein, total 6.8 04/27/2018 10:55 AM    PLATELET 045 19/59/5353 11:38 AM     Lab Results   Component Value Date/Time    GFR est non-AA 83 01/08/2019 03:21 PM    GFR est AA 95 01/08/2019 03:21 PM    Creatinine 0.76 01/08/2019 03:21 PM    BUN 19 01/08/2019 03:21 PM    Sodium 143 01/08/2019 03:21 PM    Potassium 4.5 01/08/2019 03:21 PM    Chloride 103 01/08/2019 03:21 PM    CO2 25 01/08/2019 03:21 PM     Lab Results   Component Value Date/Time    Glucose 100 (H) 01/08/2019 03:21 PM    Glucose POC 79 09/04/2019 03:45 PM         Review of Systems  Pertinent items are noted in HPI. Objective:     Significant for the following:     Visit Vitals  /86   Pulse 94   Temp 97.8 °F (36.6 °C) (Oral)   Resp 16 Comment: continuous 02 3L today   Ht 5' 9\" (1.753 m)   Wt 170 lb (77.1 kg)   LMP  (LMP Unknown)   SpO2 97%   BMI 25.10 kg/m²     Appearance: alert, and in no distress. chronic ill on O2  Macules face mouth  Exam: heart sounds normal rate, regular rhythm, normal S1, S2, no murmurs, rubs, clicks or gallops, chest clear, no hepatosplenomegaly  Foot exam: deferred    Lab review: labs reviewed, I note that glycosylated hemoglobin  Abnormal, will repeat    Assessment/Plan:     Follow-up diabetes control unclear, labs to eval  Diabetic issues reviewed with her: all medications, side effects and compliance discussed carefully, annual eye examinations at Ophthalmology discussed and glycohemoglobin and other lab monitoring discussed. Chronic Conditions Addressed Today     1. Uncontrolled type 2 diabetes mellitus with diabetic nephropathy, with long-term current use of insulin (HCC)     Relevant Medications     furosemide (LASIX) 20 mg tablet     pregabalin (LYRICA) 150 mg capsule     Other Relevant Orders     HEMOGLOBIN A1C WITH EAG    2. COPD (chronic obstructive pulmonary disease) ([facility] [address]    3. Hypothyroidism     Relevant Orders     TSH 3RD GENERATION      Acute Diagnoses Addressed Today     Encounter for immunization    -  Primary        Relevant Orders        INFLUENZA VACCINE INACTIVATED (IIV), SUBUNIT, ADJUVANTED, IM (Completed)        ADMIN INFLUENZA VIRUS VAC    Rosacea            Relevant Medications        metroNIDAZOLE (METROGEL) 1 % topical gel    Essential hypertension            Relevant Medications        furosemide (LASIX) 20 mg tablet        Other Relevant Orders        METABOLIC PANEL, BASIC    Neuropathy due to type 2 diabetes mellitus (HCC)            Relevant Medications        pregabalin (LYRICA) 150 mg capsule        Orders Placed This Encounter    INFLUENZA VACCINE INACTIVATED (IIV), SUBUNIT, ADJUVANTED, IM    METABOLIC PANEL, BASIC     Standing Status:   Future     Standing Expiration Date:   2020    TSH 3RD GENERATION     Standing Status:   Future     Standing Expiration Date:   2020    HEMOGLOBIN A1C WITH EAG     Standing Status:   Future     Standing Expiration Date:   2020    ADMIN INFLUENZA VIRUS VAC    varicella-zoster recombinant, PF, (SHINGRIX) 50 mcg/0.5 mL susr injection     Si.5 mL by IntraMUSCular route once for 1 dose. Dispense:  0.5 mL     Refill:  0    furosemide (LASIX) 20 mg tablet     Sig: Take 1 Tab by mouth daily.      Dispense:  90 Tab     Refill:  1    pregabalin (LYRICA) 150 mg capsule     Sig: Take 1 Cap by mouth two (2) times a day. Max Daily Amount: 300 mg. Dispense:  60 Cap     Refill:  1     Do not fill until November    metroNIDAZOLE (METROGEL) 1 % topical gel     Sig: Apply  to affected area daily. Use a thin layer to affected areas after washing     Dispense:  45 g     Refill:  1     Current Outpatient Medications   Medication Sig Dispense Refill    furosemide (LASIX) 20 mg tablet Take 1 Tab by mouth daily. 90 Tab 1    pregabalin (LYRICA) 150 mg capsule Take 1 Cap by mouth two (2) times a day. Max Daily Amount: 300 mg. 60 Cap 1    metroNIDAZOLE (METROGEL) 1 % topical gel Apply  to affected area daily. Use a thin layer to affected areas after washing 45 g 1    insulin detemir U-100 (LEVEMIR FLEXTOUCH) 100 unit/mL (3 mL) inpn 10 Units by SubCUTAneous route nightly. (Patient taking differently: 15 Units by SubCUTAneous route nightly.) 15 Pen 1    insulin NPH/insulin regular (HUMULIN 70/30 U-100 INSULIN) 100 unit/mL (70-30) injection 10 Units by SubCUTAneous route Daily (before breakfast). (Patient taking differently: 40 Units by SubCUTAneous route Daily (before breakfast). ) 30 mL 3    lisinopril (PRINIVIL, ZESTRIL) 20 mg tablet TAKE 1 TABLET BY MOUTH DAILY 90 Tab 3    venlafaxine (EFFEXOR) 25 mg tablet Take 1 Tab by mouth three (3) times daily. Start 1 tablet daily, usually in the evening, every few days increase as tolerated, mood. 90 Tab 1    Blood-Glucose Meter monitoring kit Check blood sugars twice daily and if suspect low blood sugar 1 Kit 0    glucose blood VI test strips (ASCENSIA AUTODISC VI, ONE TOUCH ULTRA TEST VI) strip Twice daily 100 Strip 5    lancets misc Twice daily 100 Each 5    levothyroxine (SYNTHROID) 175 mcg tablet take 1 tablet by mouth once daily 90 Tab 3    atorvastatin (LIPITOR) 20 mg tablet take 1 tablet by mouth once daily 90 Tab 3    busPIRone (BUSPAR) 7.5 mg tablet Take 1 Tab by mouth two (2) times a day.  180 Tab 1    triamcinolone acetonide (KENALOG) 0.1 % ointment Apply  to affected area two (2) times a day. use thin layer 80 g 1    albuterol-ipratropium (DUO-NEB) 2.5 mg-0.5 mg/3 ml nebu 3 mL by Nebulization route every four (4) hours as needed. 300 Nebule 1    BD INSULIN SYRINGE ULTRA-FINE 1 mL 31 gauge x 5/16 syrg 1 Each by Does Not Apply route daily. 100 Syringe 3    OXYGEN-AIR DELIVERY SYSTEMS by Does Not Apply route. Indications: Arare      aspirin delayed-release 81 mg tablet Take 1 Tab by mouth daily. .  See patient instructions, went over them personally with the patient. Emphasized compliance. Questions answered. Patient states that they understand the plan of action and will call if there are any issues or misunderstandings. Would consider carb counting with insulin adjustments if no better. HTN borderline watch for now, COPD stable. Follow-up and Dispositions    · Return in about 6 weeks (around 11/27/2019) for routine follow up.

## 2020-01-01 ENCOUNTER — TELEPHONE (OUTPATIENT)
Dept: INTERNAL MEDICINE CLINIC | Age: 65
End: 2020-01-01

## 2020-01-01 ENCOUNTER — OFFICE VISIT (OUTPATIENT)
Dept: INTERNAL MEDICINE CLINIC | Age: 65
End: 2020-01-01

## 2020-01-01 VITALS
HEART RATE: 102 BPM | BODY MASS INDEX: 26.22 KG/M2 | HEIGHT: 69 IN | TEMPERATURE: 97.1 F | DIASTOLIC BLOOD PRESSURE: 90 MMHG | OXYGEN SATURATION: 92 % | SYSTOLIC BLOOD PRESSURE: 158 MMHG | WEIGHT: 177 LBS

## 2020-01-01 DIAGNOSIS — E78.00 HIGH CHOLESTEROL: ICD-10-CM

## 2020-01-01 DIAGNOSIS — F33.1 DEPRESSION, MAJOR, RECURRENT, MODERATE (HCC): ICD-10-CM

## 2020-01-01 DIAGNOSIS — H93.13 TINNITUS OF BOTH EARS: Primary | ICD-10-CM

## 2020-01-01 DIAGNOSIS — I10 ESSENTIAL HYPERTENSION: ICD-10-CM

## 2020-01-01 DIAGNOSIS — E07.9 THYROID DISEASE: ICD-10-CM

## 2020-01-01 DIAGNOSIS — E11.40 NEUROPATHY DUE TO TYPE 2 DIABETES MELLITUS (HCC): ICD-10-CM

## 2020-01-01 DIAGNOSIS — E08.42 DIABETIC POLYNEUROPATHY ASSOCIATED WITH DIABETES MELLITUS DUE TO UNDERLYING CONDITION (HCC): ICD-10-CM

## 2020-01-01 LAB
BUN SERPL-MCNC: 18 MG/DL (ref 8–27)
BUN/CREAT SERPL: 21 (ref 12–28)
CALCIUM SERPL-MCNC: 9.1 MG/DL (ref 8.7–10.3)
CHLORIDE SERPL-SCNC: 101 MMOL/L (ref 96–106)
CO2 SERPL-SCNC: 27 MMOL/L (ref 20–29)
CREAT SERPL-MCNC: 0.86 MG/DL (ref 0.57–1)
EST. AVERAGE GLUCOSE BLD GHB EST-MCNC: 266 MG/DL
GLUCOSE SERPL-MCNC: 261 MG/DL (ref 65–99)
HBA1C MFR BLD: 10.9 % (ref 4.8–5.6)
POTASSIUM SERPL-SCNC: 4.6 MMOL/L (ref 3.5–5.2)
SODIUM SERPL-SCNC: 142 MMOL/L (ref 134–144)

## 2020-01-01 RX ORDER — VENLAFAXINE 25 MG/1
TABLET ORAL
Qty: 90 TAB | Refills: 1 | Status: SHIPPED | OUTPATIENT
Start: 2020-01-01

## 2020-01-01 RX ORDER — PREGABALIN 150 MG/1
150 CAPSULE ORAL 2 TIMES DAILY
Qty: 60 CAP | Refills: 1 | Status: SHIPPED | OUTPATIENT
Start: 2020-01-01

## 2020-01-01 RX ORDER — AMLODIPINE BESYLATE 5 MG/1
5 TABLET ORAL DAILY
Qty: 30 TAB | Refills: 5 | Status: SHIPPED | OUTPATIENT
Start: 2020-01-01

## 2020-01-01 RX ORDER — LISINOPRIL 20 MG/1
TABLET ORAL
Qty: 90 TAB | Refills: 1 | Status: SHIPPED | OUTPATIENT
Start: 2020-01-01

## 2020-01-01 RX ORDER — FUROSEMIDE 20 MG/1
20 TABLET ORAL DAILY
Qty: 90 TAB | Refills: 1 | Status: SHIPPED | OUTPATIENT
Start: 2020-01-01

## 2020-01-01 RX ORDER — ATORVASTATIN CALCIUM 20 MG/1
TABLET, FILM COATED ORAL
Qty: 90 TAB | Refills: 1 | Status: SHIPPED | OUTPATIENT
Start: 2020-01-01

## 2020-01-01 RX ORDER — LEVOTHYROXINE SODIUM 175 UG/1
TABLET ORAL
Qty: 90 TAB | Refills: 1 | Status: SHIPPED | OUTPATIENT
Start: 2020-01-01

## 2020-02-17 NOTE — PATIENT INSTRUCTIONS

## 2020-02-17 NOTE — PROGRESS NOTES
Subjective:  
 
Edna Bello is a 75 y.o. female who presents for follow up of diabetes and hypertension. Here with daughter. On O2 for COPD. New concerns: Bi tinnitus x 2 weeks and HA, thinks re to htn Also has DM poorly controlled. Takes her insulin. No hypos RE lyrica rf works [employer] Lab Results Component Value Date/Time Hemoglobin A1c 13.1 (H) 2019 03:21 PM  
 Hemoglobin A1c 11.3 (H) 07/10/2017 09:07 AM  
 Hemoglobin A1c, External 9.5 2017 Glucose 100 (H) 2019 03:21 PM  
 Glucose POC 79 2019 03:45 PM  
 Microalb/Creat ratio (ug/mg creat.) 305.2 (H) 2019 03:43 PM  
 MICROALBUMIN,MG/DAY Comment 07/10/2017 09:07 AM  
 LDL, calculated 58 2019 03:21 PM  
 Creatinine 0.76 2019 03:21 PM  
 
 
 
Diet and Lifestyle: nonsmoker Cardiovascular ROS: taking medications as instructed, no medication side effects noted, no TIA's, possible neurological symptoms dizzy, noting some chest pains described as aches and pains here and there not exertional, notes stable dyspnea on exertion, no change, no swelling of ankles. Review of Systems, additional: 
Pertinent items are noted in HPI. Patient Active Problem List  
 Diagnosis Date Noted  Depression, major, recurrent, moderate ([address] 2019  Hypothyroidism 2018  Type 2 diabetes mellitus with diabetic neuropathy ([address] 2018  COPD (chronic obstructive pulmonary disease) ([address] 2018  At high risk for falls 2017  Bronchitis 2017  Uncontrolled type 2 diabetes mellitus with diabetic nephropathy, with long-term current use of insulin ([address] 2016 Allergies Allergen Reactions  Metformin Nausea and Vomiting Social History Tobacco Use  Smoking status: Former Smoker Packs/day: 2.00 Years: 50.00 Pack years: 100.00 Last attempt to quit: 8/3/2017 Years since quittin.5  Smokeless tobacco: Never Used Substance Use Topics  Alcohol use: No  
  
 
Lab Results Component Value Date/Time WBC 8.0 09/14/2017 11:38 AM  
 HGB 10.9 (L) 09/14/2017 11:38 AM  
 HCT 34.4 09/14/2017 11:38 AM  
 PLATELET 868 06/89/9946 11:38 AM  
 MCV 98 (H) 09/14/2017 11:38 AM  
 
Lab Results Component Value Date/Time Hemoglobin A1c 13.1 (H) 01/08/2019 03:21 PM  
 Hemoglobin A1c 11.3 (H) 07/10/2017 09:07 AM  
 Hemoglobin A1c, External 9.5 08/04/2017 Glucose 100 (H) 01/08/2019 03:21 PM  
 Glucose POC 79 09/04/2019 03:45 PM  
 Microalb/Creat ratio (ug/mg creat.) 305.2 (H) 09/04/2019 03:43 PM  
 MICROALBUMIN,MG/DAY Comment 07/10/2017 09:07 AM  
 LDL, calculated 58 01/08/2019 03:21 PM  
 Creatinine 0.76 01/08/2019 03:21 PM  
  
Lab Results Component Value Date/Time Cholesterol, total 169 01/08/2019 03:21 PM  
 HDL Cholesterol 96 01/08/2019 03:21 PM  
 LDL, calculated 58 01/08/2019 03:21 PM  
 Triglyceride 73 01/08/2019 03:21 PM  
 
Lab Results Component Value Date/Time ALT (SGPT) 16 04/27/2018 10:55 AM  
 AST (SGOT) 20 04/27/2018 10:55 AM  
 Alk. phosphatase 122 (H) 04/27/2018 10:55 AM  
 Bilirubin, total 0.4 04/27/2018 10:55 AM  
 Albumin 4.3 04/27/2018 10:55 AM  
 Protein, total 6.8 04/27/2018 10:55 AM  
 PLATELET 758 37/25/0785 11:38 AM  
 
Lab Results Component Value Date/Time GFR est non-AA 83 01/08/2019 03:21 PM  
 GFR est AA 95 01/08/2019 03:21 PM  
 Creatinine 0.76 01/08/2019 03:21 PM  
 BUN 19 01/08/2019 03:21 PM  
 Sodium 143 01/08/2019 03:21 PM  
 Potassium 4.5 01/08/2019 03:21 PM  
 Chloride 103 01/08/2019 03:21 PM  
 CO2 25 01/08/2019 03:21 PM  
 
Lab Results Component Value Date/Time Glucose 100 (H) 01/08/2019 03:21 PM  
 Glucose POC 79 09/04/2019 03:45 PM  
   
 
 
 
Objective:  
 
Physical exam significant for the following:  
 
Chronic ill on O2 Visit Vitals /90 Pulse (!) 102 Temp 97.1 °F (36.2 °C) (Temporal) Ht 5' 9\" (1.753 m) Wt 177 lb (80.3 kg) LMP  (LMP Unknown) SpO2 92% Comment: 022[place] BMI 26.14 kg/m² Well developed well nourished no acute distress. Pupils equal round react to light, extraocular muscles intact. Tympanic membranes within normal limits throat unremarkable Cranial nerves II through XII are intact. Neck unremarkable Heart regular rate and rhythm without clicks murmurs rubs Lungs are clear to auscultation Abdomen soft. Extremities, motor 5 out of 5 bilaterally, reflexes 2+ equal bilaterally. Assessment/Plan:  
 
hypertension borderline controlled. Dm ? Better control, labs to eval. 
Tinnitus ? From HTN Start amlodipien Discussed possible side affects, precautions, and drug interactions and possible benefits of the medication(s). OK lyrica ICD-10-CM ICD-9-CM 1. Tinnitus of both ears H93.13 388.30 2. Neuropathy due to type 2 diabetes mellitus ([facility]) E11.40 250.60 pregabalin (LYRICA) 150 mg capsule  
  357.2 3. Essential hypertension I10 401.9 amLODIPine (NORVASC) 5 mg tablet  
   furosemide (LASIX) 20 mg tablet METABOLIC PANEL, BASIC  
4. Uncontrolled type 2 diabetes mellitus with diabetic nephropathy, with long-term current use of insulin ([facility]) E11.21 250.42 insulin NPH/insulin regular (HUMULIN 70/30 U-100 INSULIN) 100 unit/mL (70-30) injection E11.65 583.81 HEMOGLOBIN A1C WITH EAG  
 Z79.4 V58.67 insulin detemir U-100 (LEVEMIR FLEXTOUCH) 100 unit/mL (3 mL) inpn 5. Diabetic polyneuropathy associated with diabetes mellitus due to underlying condition ([facility]) E08.42 249.60   
  357.2 Orders Placed This Encounter  METABOLIC PANEL, BASIC Standing Status:   Future Standing Expiration Date:   8/19/2020  
 HEMOGLOBIN A1C WITH EAG Standing Status:   Future Standing Expiration Date:   8/16/2020  pregabalin (LYRICA) 150 mg capsule Sig: Take 1 Cap by mouth two (2) times a day. Max Daily Amount: 300 mg. Dispense:  60 Cap Refill:  1 Do not fill until November  amLODIPine (NORVASC) 5 mg tablet Sig: Take 1 Tab by mouth daily. Dispense:  30 Tab Refill:  5  
 furosemide (LASIX) 20 mg tablet Sig: Take 1 Tab by mouth daily. Dispense:  90 Tab Refill:  1  insulin NPH/insulin regular (HUMULIN 70/30 U-100 INSULIN) 100 unit/mL (70-30) injection Si Units by SubCUTAneous route Daily (before breakfast). Dispense:  2 Vial  
  Refill:  5 Do not send  insulin detemir U-100 (LEVEMIR FLEXTOUCH) 100 unit/mL (3 mL) inpn Si Units by SubCUTAneous route nightly. Dispense:  5 Pen Refill:  5 Do not send Follow-up and Dispositions · Return in about 2 months (around 2020) for routine follow up.

## 2020-02-17 NOTE — PROGRESS NOTES
Chief Complaint Patient presents with  Hypertension  
  pt feeing dizzy and ears ringing x1.5 weeks I have reviewed the patient's medical history in detail and updated the computerized patient record. Health Maintenance reviewed. 1. Have you been to the ER, urgent care clinic since your last visit? Hospitalized since your last visit?no 2. Have you seen or consulted any other health care providers outside of the [address] [place] [address] since your last visit? Include any pap smears or colon screening. No 
 
 
Encouraged pt to discuss pt's wishes with spouse/partner/family and bring them in the next appt to follow thru with the Advanced Directive Fall Risk Assessment, last 12 mths 2/17/2020 Able to walk? Yes Fall in past 12 months? No  
 
 
3 most recent PHQ Screens 2/17/2020 Little interest or pleasure in doing things Nearly every day Feeling down, depressed, irritable, or hopeless Nearly every day Total Score PHQ 2 6 Trouble falling or staying asleep, or sleeping too much - Feeling tired or having little energy - Poor appetite, weight loss, or overeating - Feeling bad about yourself - or that you are a failure or have let yourself or your family down - Trouble concentrating on things such as school, work, reading, or watching TV - Moving or speaking so slowly that other people could have noticed; or the opposite being so fidgety that others notice - Thoughts of being better off dead, or hurting yourself in some way -  
PHQ 9 Score - How difficult have these problems made it for you to do your work, take care of your home and get along with others - Abuse Screening Questionnaire 2/17/2020 Do you ever feel afraid of your partner? Marvene Hence Are you in a relationship with someone who physically or mentally threatens you? Marvene Hence Is it safe for you to go home? Y  
 
 

## 2020-02-21 NOTE — TELEPHONE ENCOUNTER
Requested Prescriptions     Pending Prescriptions Disp Refills    levothyroxine (SYNTHROID) 175 mcg tablet 90 Tab 3     Sig: take 1 tablet by mouth once daily    lisinopril (PRINIVIL, ZESTRIL) 20 mg tablet 90 Tab 3    atorvastatin (LIPITOR) 20 mg tablet 90 Tab 3     Sig: take 1 tablet by mouth once daily

## 2020-02-21 NOTE — TELEPHONE ENCOUNTER
Requested Prescriptions     Pending Prescriptions Disp Refills    levothyroxine (SYNTHROID) 175 mcg tablet 90 Tab 3     Sig: take 1 tablet by mouth once daily    lisinopril (PRINIVIL, ZESTRIL) 20 mg tablet 90 Tab 3    atorvastatin (LIPITOR) 20 mg tablet 90 Tab 3     Sig: take 1 tablet by mouth once daily     Last office visit:  2/17/2020  Last filled:  6/25/2019 90 day supply X 3 refills  Follow up 4/16/2020

## 2020-02-22 NOTE — PROGRESS NOTES
Send results letter as below: 
Your recent lab showed the following abnomality diabetes blood sugar levels still too high but have shown improvement, compared to the previous level. For now no change in your insulin. We need you to return to re-check the laboratory value again in 3 months.

## 2020-04-03 ENCOUNTER — TELEPHONE (OUTPATIENT)
Dept: INTERNAL MEDICINE CLINIC | Age: 65
End: 2020-04-03

## 2020-04-03 NOTE — TELEPHONE ENCOUNTER
----- Message from German Christiansen sent at 4/3/2020  9:45 AM EDT -----  Regarding: Dr. Vaishnavi Mohr Message/Vendor Calls    Caller's first and last name:Juve Nikolai Yash      Reason for call:questions about patient      Callback required yes/no and why:yes      Best contact number(s):[phone]      Details to clarify the request: Patient has passed and Lubna Amaya from [facility] has some questions for you before he speaks to the family about arrangements.      German Christiansen

## 2020-04-07 ENCOUNTER — TELEPHONE (OUTPATIENT)
Dept: INTERNAL MEDICINE CLINIC | Age: 65
End: 2020-04-07

## 2020-04-07 NOTE — TELEPHONE ENCOUNTER
----- Message from Angel Raines sent at 4/7/2020  3:00 PM EDT -----  Regarding: Dr. Kymberly Rodgers first and last name: Susy Velez from [facility]  Reason for call:   wanted to notify that a death certificate was sent over to the office through the [employer] syste,  Callback required yes/no and why: no  Best contact number(s):    Details to clarify the request:

## 2025-01-07 NOTE — PROGRESS NOTES
Refill Decision Note   Lorenzo Schuler  is requesting a refill authorization.  Brief Assessment and Rationale for Refill:  Approve     Medication Therapy Plan:         Comments:     Note composed:10:16 AM 01/07/2025             Chief Complaint   Patient presents with    Leg Swelling     X 2 WEEKS     1. Have you been to the ER, urgent care clinic since your last visit? Hospitalized since your last visit? No    2. Have you seen or consulted any other health care providers outside of the [address] [place] [address] since your last visit? Include any pap smears or colon screening. No     There are no preventive care reminders to display for this patient. Do you have an [address] in place in the event that you have a healthcare crisis that could impact your decision making as it pertains to your health? NO    Would you like information about Advance Care Planning? NO    Information given.  NO